# Patient Record
Sex: FEMALE | Race: WHITE | Employment: FULL TIME | URBAN - METROPOLITAN AREA
[De-identification: names, ages, dates, MRNs, and addresses within clinical notes are randomized per-mention and may not be internally consistent; named-entity substitution may affect disease eponyms.]

---

## 2017-10-16 ENCOUNTER — APPOINTMENT (OUTPATIENT)
Dept: ULTRASOUND IMAGING | Facility: HOSPITAL | Age: 47
End: 2017-10-16
Payer: COMMERCIAL

## 2017-10-16 ENCOUNTER — APPOINTMENT (OUTPATIENT)
Dept: GENERAL RADIOLOGY | Facility: HOSPITAL | Age: 47
End: 2017-10-16
Attending: EMERGENCY MEDICINE
Payer: COMMERCIAL

## 2017-10-16 ENCOUNTER — HOSPITAL ENCOUNTER (EMERGENCY)
Facility: HOSPITAL | Age: 47
Discharge: HOME | End: 2017-10-16
Payer: COMMERCIAL

## 2017-10-16 VITALS
HEART RATE: 59 BPM | WEIGHT: 250 LBS | DIASTOLIC BLOOD PRESSURE: 93 MMHG | RESPIRATION RATE: 18 BRPM | SYSTOLIC BLOOD PRESSURE: 149 MMHG | OXYGEN SATURATION: 98 % | BODY MASS INDEX: 41.65 KG/M2 | TEMPERATURE: 98 F | HEIGHT: 65 IN

## 2017-10-16 DIAGNOSIS — M79.604 RIGHT LEG PAIN: Primary | ICD-10-CM

## 2017-10-16 PROCEDURE — 73590 X-RAY EXAM OF LOWER LEG: CPT | Performed by: EMERGENCY MEDICINE

## 2017-10-16 PROCEDURE — 99284 EMERGENCY DEPT VISIT MOD MDM: CPT

## 2017-10-16 PROCEDURE — 93971 EXTREMITY STUDY: CPT

## 2017-10-16 RX ORDER — TRAMADOL HYDROCHLORIDE 50 MG/1
50 TABLET ORAL EVERY 4 HOURS PRN
Qty: 20 TABLET | Refills: 0 | Status: SHIPPED | OUTPATIENT
Start: 2017-10-16 | End: 2017-10-23

## 2017-10-16 RX ORDER — IBUPROFEN 600 MG/1
600 TABLET ORAL EVERY 8 HOURS PRN
Qty: 30 TABLET | Refills: 0 | Status: SHIPPED | OUTPATIENT
Start: 2017-10-16 | End: 2017-10-23

## 2017-10-16 NOTE — ED PROVIDER NOTES
Patient Seen in: [facility] AND [facility] Emergency Department    History   Patient presents with:  Deep Vein Thrombosis (cardiovascular)    Stated Complaint: right leg pain behind the knee    HPI    Patient presents emergency department complaining of 3 days o are intact. Normal sensation light touch is present. Nursing note and vitals reviewed.             ED Course   Labs Reviewed - No data to display    ============================================================  ED Course  -------------------------------- Tab  Take 1 tablet (600 mg total) by mouth every 8 (eight) hours as needed for Pain or Fever., Script printed, Disp-30 tablet, R-0

## 2017-10-16 NOTE — ED INITIAL ASSESSMENT (HPI)
Right ankle pain that's radiates up entire leg awoke with this today denies any injury denies any hx of DVT

## 2017-12-12 ENCOUNTER — OFFICE VISIT (OUTPATIENT)
Dept: FAMILY MEDICINE CLINIC | Facility: CLINIC | Age: 47
End: 2017-12-12

## 2017-12-12 VITALS
SYSTOLIC BLOOD PRESSURE: 151 MMHG | HEART RATE: 54 BPM | DIASTOLIC BLOOD PRESSURE: 99 MMHG | HEIGHT: 64 IN | BODY MASS INDEX: 41.83 KG/M2 | TEMPERATURE: 98 F | WEIGHT: 245 LBS

## 2017-12-12 DIAGNOSIS — E66.3 OVERWEIGHT: ICD-10-CM

## 2017-12-12 DIAGNOSIS — Z00.00 ANNUAL PHYSICAL EXAM: Primary | ICD-10-CM

## 2017-12-12 PROCEDURE — 99386 PREV VISIT NEW AGE 40-64: CPT | Performed by: FAMILY MEDICINE

## 2017-12-12 RX ORDER — TRAMADOL HYDROCHLORIDE 50 MG/1
TABLET ORAL
Refills: 0 | COMMUNITY
Start: 2017-11-02 | End: 2017-12-12 | Stop reason: ALTCHOICE

## 2017-12-15 NOTE — PROGRESS NOTES
HPI:    Patient ID: Dyan Mak is a 49year old female. HPI    Review of Systems   Constitutional: Negative. Respiratory: Negative. Cardiovascular: Negative. Gastrointestinal: Negative. Skin: Negative. Neurological: Negative.

## 2018-01-03 ENCOUNTER — LAB ENCOUNTER (OUTPATIENT)
Dept: LAB | Age: 48
End: 2018-01-03
Attending: FAMILY MEDICINE
Payer: COMMERCIAL

## 2018-01-03 DIAGNOSIS — Z00.00 ANNUAL PHYSICAL EXAM: ICD-10-CM

## 2018-01-03 LAB
ALBUMIN SERPL BCP-MCNC: 3.5 G/DL (ref 3.5–4.8)
ALBUMIN/GLOB SERPL: 1.2 {RATIO} (ref 1–2)
ALP SERPL-CCNC: 50 U/L (ref 32–100)
ALT SERPL-CCNC: 12 U/L (ref 14–54)
ANION GAP SERPL CALC-SCNC: 6 MMOL/L (ref 0–18)
AST SERPL-CCNC: 13 U/L (ref 15–41)
BASOPHILS # BLD: 0.1 K/UL (ref 0–0.2)
BASOPHILS NFR BLD: 1 %
BILIRUB SERPL-MCNC: 0.9 MG/DL (ref 0.3–1.2)
BUN SERPL-MCNC: 15 MG/DL (ref 8–20)
BUN/CREAT SERPL: 18.1 (ref 10–20)
CALCIUM SERPL-MCNC: 8.8 MG/DL (ref 8.5–10.5)
CHLORIDE SERPL-SCNC: 106 MMOL/L (ref 95–110)
CHOLEST SERPL-MCNC: 172 MG/DL (ref 110–200)
CO2 SERPL-SCNC: 28 MMOL/L (ref 22–32)
CREAT SERPL-MCNC: 0.83 MG/DL (ref 0.5–1.5)
EOSINOPHIL # BLD: 0.2 K/UL (ref 0–0.7)
EOSINOPHIL NFR BLD: 3 %
ERYTHROCYTE [DISTWIDTH] IN BLOOD BY AUTOMATED COUNT: 15 % (ref 11–15)
GLOBULIN PLAS-MCNC: 2.9 G/DL (ref 2.5–3.7)
GLUCOSE SERPL-MCNC: 84 MG/DL (ref 70–99)
HCT VFR BLD AUTO: 38 % (ref 35–48)
HDLC SERPL-MCNC: 69 MG/DL
HGB BLD-MCNC: 12.7 G/DL (ref 12–16)
LDLC SERPL CALC-MCNC: 92 MG/DL (ref 0–99)
LYMPHOCYTES # BLD: 1.6 K/UL (ref 1–4)
LYMPHOCYTES NFR BLD: 30 %
MCH RBC QN AUTO: 27.7 PG (ref 27–32)
MCHC RBC AUTO-ENTMCNC: 33.3 G/DL (ref 32–37)
MCV RBC AUTO: 83.2 FL (ref 80–100)
MONOCYTES # BLD: 0.4 K/UL (ref 0–1)
MONOCYTES NFR BLD: 8 %
NEUTROPHILS # BLD AUTO: 3.1 K/UL (ref 1.8–7.7)
NEUTROPHILS NFR BLD: 58 %
NONHDLC SERPL-MCNC: 103 MG/DL
OSMOLALITY UR CALC.SUM OF ELEC: 290 MOSM/KG (ref 275–295)
PLATELET # BLD AUTO: 202 K/UL (ref 140–400)
PMV BLD AUTO: 8.9 FL (ref 7.4–10.3)
POTASSIUM SERPL-SCNC: 4.5 MMOL/L (ref 3.3–5.1)
PROT SERPL-MCNC: 6.4 G/DL (ref 5.9–8.4)
RBC # BLD AUTO: 4.56 M/UL (ref 3.7–5.4)
SODIUM SERPL-SCNC: 140 MMOL/L (ref 136–144)
TRIGL SERPL-MCNC: 56 MG/DL (ref 1–149)
TSH SERPL-ACNC: 2.24 UIU/ML (ref 0.45–5.33)
WBC # BLD AUTO: 5.4 K/UL (ref 4–11)

## 2018-01-03 PROCEDURE — 84443 ASSAY THYROID STIM HORMONE: CPT

## 2018-01-03 PROCEDURE — 85025 COMPLETE CBC W/AUTO DIFF WBC: CPT

## 2018-01-03 PROCEDURE — 80053 COMPREHEN METABOLIC PANEL: CPT

## 2018-01-03 PROCEDURE — 36415 COLL VENOUS BLD VENIPUNCTURE: CPT

## 2018-01-03 PROCEDURE — 80061 LIPID PANEL: CPT

## 2018-01-05 ENCOUNTER — TELEPHONE (OUTPATIENT)
Dept: FAMILY MEDICINE CLINIC | Facility: CLINIC | Age: 48
End: 2018-01-05

## 2018-01-05 NOTE — TELEPHONE ENCOUNTER
----- Message from Prabha Linares MD sent at 1/5/2018  4:56 AM CST -----  Results normal. Please call patient and send results.  Continue present management

## 2018-01-10 ENCOUNTER — NURSE TRIAGE (OUTPATIENT)
Dept: OTHER | Age: 48
End: 2018-01-10

## 2018-01-10 NOTE — TELEPHONE ENCOUNTER
Action Requested: Summary for Provider     []  Critical Lab, Recommendations Needed  [] Need Additional Advice  []   FYI    []   Need Orders  [] Need Medications Sent to Pharmacy  []  Other     SUMMARY: Pt stts for 3 days has dry cough,sore throat,body ach

## 2018-01-11 ENCOUNTER — OFFICE VISIT (OUTPATIENT)
Dept: FAMILY MEDICINE CLINIC | Facility: CLINIC | Age: 48
End: 2018-01-11

## 2018-01-11 VITALS
TEMPERATURE: 98 F | HEART RATE: 62 BPM | DIASTOLIC BLOOD PRESSURE: 89 MMHG | WEIGHT: 245 LBS | SYSTOLIC BLOOD PRESSURE: 131 MMHG | BODY MASS INDEX: 42 KG/M2

## 2018-01-11 DIAGNOSIS — J11.1 INFLUENZA: Primary | ICD-10-CM

## 2018-01-11 LAB
ADENOVIRUS PCR:: NEGATIVE
B PERT DNA SPEC QL NAA+PROBE: NEGATIVE
C PNEUM DNA SPEC QL NAA+PROBE: NEGATIVE
CORONAVIRUS 229E PCR:: NEGATIVE
CORONAVIRUS HKU1 PCR:: POSITIVE
CORONAVIRUS NL63 PCR:: NEGATIVE
CORONAVIRUS OC43 PCR:: NEGATIVE
FLUAV RNA SPEC QL NAA+PROBE: NEGATIVE
FLUBV RNA SPEC QL NAA+PROBE: NEGATIVE
METAPNEUMOVIRUS PCR:: NEGATIVE
MYCOPLASMA PNEUMONIA PCR:: NEGATIVE
PARAINFLUENZA 1 PCR:: NEGATIVE
PARAINFLUENZA 2 PCR:: NEGATIVE
PARAINFLUENZA 3 PCR:: NEGATIVE
PARAINFLUENZA 4 PCR:: NEGATIVE
RHINOVIRUS/ENTERO PCR:: NEGATIVE
RSV RNA SPEC QL NAA+PROBE: NEGATIVE

## 2018-01-11 PROCEDURE — 99213 OFFICE O/P EST LOW 20 MIN: CPT | Performed by: FAMILY MEDICINE

## 2018-01-11 PROCEDURE — 99212 OFFICE O/P EST SF 10 MIN: CPT | Performed by: FAMILY MEDICINE

## 2018-01-11 RX ORDER — OSELTAMIVIR PHOSPHATE 75 MG/1
75 CAPSULE ORAL 2 TIMES DAILY
Qty: 10 CAPSULE | Refills: 0 | Status: SHIPPED | OUTPATIENT
Start: 2018-01-11 | End: 2018-12-04

## 2018-01-11 NOTE — PROGRESS NOTES
HPI:    Patient ID: Boogie Hernandez is a 49year old female. Started Sunday night. A lot of body aches, some cough, sore thrat and fever. Not better at all/        Review of Systems   Constitutional: Positive for fatigue.  Negative for activity change, appe

## 2018-01-12 ENCOUNTER — NURSE TRIAGE (OUTPATIENT)
Dept: OTHER | Age: 48
End: 2018-01-12

## 2018-01-12 RX ORDER — ONDANSETRON HYDROCHLORIDE 8 MG/1
8 TABLET, FILM COATED ORAL 3 TIMES DAILY PRN
Qty: 20 TABLET | Refills: 0 | Status: SHIPPED | OUTPATIENT
Start: 2018-01-12 | End: 2018-12-04 | Stop reason: ALTCHOICE

## 2018-01-12 NOTE — TELEPHONE ENCOUNTER
Vomiting and nausea since started taking tamiflu yesterday. Has vomited about 9 times in the last 24 hours. Patient is drinking fluids and urinating every 6-8 hours. No diarrhea. Not sure if has a fever, since did not take temperature.  Not able to look at

## 2018-01-13 NOTE — TELEPHONE ENCOUNTER
Advised patient of Dr. Josie Nowak note and if heladiofran not helping with the vomiting, not able to keep fluids down, or not urinating then needs to go to the ER. Patient verbalized understanding. Rx sent to pharmacy.

## 2018-01-15 ENCOUNTER — PATIENT MESSAGE (OUTPATIENT)
Dept: FAMILY MEDICINE CLINIC | Facility: CLINIC | Age: 48
End: 2018-01-15

## 2018-01-16 NOTE — TELEPHONE ENCOUNTER
From: Eileen Christianson  To: Eleuterio Mccullough MD  Sent: 1/15/2018 9:55 AM CST  Subject: Test Results Question    I had the test for the flu and was never given the results. Did I have the flu? If so what kind, please post test results. Thank you.

## 2018-10-31 ENCOUNTER — TELEPHONE (OUTPATIENT)
Dept: FAMILY MEDICINE CLINIC | Facility: CLINIC | Age: 48
End: 2018-10-31

## 2018-10-31 DIAGNOSIS — Z12.31 SCREENING MAMMOGRAM, ENCOUNTER FOR: Primary | ICD-10-CM

## 2018-11-12 ENCOUNTER — TELEPHONE (OUTPATIENT)
Dept: FAMILY MEDICINE CLINIC | Facility: CLINIC | Age: 48
End: 2018-11-12

## 2018-11-12 NOTE — TELEPHONE ENCOUNTER
Spoke with patient regarding appt. For Tuesday 11-13-18 and that Dr. Kanika Santiago may be at a meeting at that time. Patient is requesting to see Dr. Kanika Santiago and doesn't get off of work until 5 pm. Patient would not disclose what she wanted to be seen for.  Informe

## 2018-11-13 ENCOUNTER — NURSE TRIAGE (OUTPATIENT)
Dept: OTHER | Age: 48
End: 2018-11-13

## 2018-11-13 NOTE — TELEPHONE ENCOUNTER
Action Requested: Summary for Provider     []  Critical Lab, Recommendations Needed  [] Need Additional Advice  [x]   FYI    []   Need Orders  [] Need Medications Sent to Pharmacy  []  Other     SUMMARY: Call transferred to RN as PCP had to cancel and resc

## 2018-11-14 NOTE — TELEPHONE ENCOUNTER
Pt states that she was not able to go to the ER. States that she gets HAs everyday and would really like to discuss this with her PCP at an appt instead of the ER. Pt's appt 11/13 was cancelled due to a change in the PCP's schedule.   Could pt possibly be

## 2018-11-19 NOTE — TELEPHONE ENCOUNTER
LMTCB please transfer to triage. Thanks  Pt was sent a no response letter through [address] [place] [address] [address]. Spoke to Dr Smita Bui about pt failing speech evaluation     Nicol Villagran RN  34/02/79 [phone]

## 2018-12-04 ENCOUNTER — OFFICE VISIT (OUTPATIENT)
Dept: FAMILY MEDICINE CLINIC | Facility: CLINIC | Age: 48
End: 2018-12-04
Payer: COMMERCIAL

## 2018-12-04 VITALS
SYSTOLIC BLOOD PRESSURE: 129 MMHG | TEMPERATURE: 98 F | WEIGHT: 260.19 LBS | HEIGHT: 64 IN | HEART RATE: 66 BPM | BODY MASS INDEX: 44.42 KG/M2 | DIASTOLIC BLOOD PRESSURE: 86 MMHG

## 2018-12-04 DIAGNOSIS — Z00.00 ANNUAL PHYSICAL EXAM: Primary | ICD-10-CM

## 2018-12-04 DIAGNOSIS — Z01.419 WOMEN'S ANNUAL ROUTINE GYNECOLOGICAL EXAMINATION: ICD-10-CM

## 2018-12-04 PROCEDURE — 99396 PREV VISIT EST AGE 40-64: CPT | Performed by: FAMILY MEDICINE

## 2018-12-05 NOTE — PROGRESS NOTES
HPI:    Patient ID: Alma Bello is a 51year old female. Here for pap test        Review of Systems   Constitutional: Negative. Negative for activity change, appetite change, chills and diaphoresis. Respiratory: Negative.   Negative for cough, chest ti reflexes. Skin: Skin is warm. Psychiatric: She has a normal mood and affect. Her behavior is normal. Thought content normal.              ASSESSMENT/PLAN:   No diagnosis found. No orders of the defined types were placed in this encounter.       Meds

## 2019-03-22 ENCOUNTER — OFFICE VISIT (OUTPATIENT)
Dept: FAMILY MEDICINE CLINIC | Facility: CLINIC | Age: 49
End: 2019-03-22
Payer: COMMERCIAL

## 2019-03-22 ENCOUNTER — OFFICE VISIT (OUTPATIENT)
Dept: OTOLARYNGOLOGY | Facility: CLINIC | Age: 49
End: 2019-03-22
Payer: COMMERCIAL

## 2019-03-22 VITALS
SYSTOLIC BLOOD PRESSURE: 148 MMHG | TEMPERATURE: 98 F | DIASTOLIC BLOOD PRESSURE: 88 MMHG | WEIGHT: 261 LBS | HEIGHT: 64 IN | BODY MASS INDEX: 44.56 KG/M2

## 2019-03-22 VITALS
BODY MASS INDEX: 44.56 KG/M2 | HEIGHT: 64 IN | WEIGHT: 261 LBS | TEMPERATURE: 98 F | DIASTOLIC BLOOD PRESSURE: 89 MMHG | HEART RATE: 60 BPM | SYSTOLIC BLOOD PRESSURE: 141 MMHG

## 2019-03-22 DIAGNOSIS — H92.02 LEFT EAR PAIN: Primary | ICD-10-CM

## 2019-03-22 DIAGNOSIS — R07.0 THROAT PAIN: Primary | ICD-10-CM

## 2019-03-22 DIAGNOSIS — R07.0 THROAT PAIN IN ADULT: ICD-10-CM

## 2019-03-22 PROCEDURE — 99212 OFFICE O/P EST SF 10 MIN: CPT | Performed by: FAMILY MEDICINE

## 2019-03-22 PROCEDURE — 99213 OFFICE O/P EST LOW 20 MIN: CPT | Performed by: FAMILY MEDICINE

## 2019-03-22 PROCEDURE — 99243 OFF/OP CNSLTJ NEW/EST LOW 30: CPT | Performed by: OTOLARYNGOLOGY

## 2019-03-22 PROCEDURE — 99212 OFFICE O/P EST SF 10 MIN: CPT | Performed by: OTOLARYNGOLOGY

## 2019-03-22 RX ORDER — AZELASTINE 1 MG/ML
2 SPRAY, METERED NASAL 2 TIMES DAILY
Qty: 1 BOTTLE | Refills: 3 | Status: SHIPPED | OUTPATIENT
Start: 2019-03-22 | End: 2020-01-28 | Stop reason: ALTCHOICE

## 2019-03-22 RX ORDER — CELECOXIB 200 MG/1
200 CAPSULE ORAL DAILY PRN
Qty: 30 CAPSULE | Refills: 0 | Status: SHIPPED | OUTPATIENT
Start: 2019-03-22 | End: 2019-04-15

## 2019-03-22 RX ORDER — CYCLOBENZAPRINE HCL 5 MG
5 TABLET ORAL 3 TIMES DAILY PRN
Qty: 90 TABLET | Refills: 1 | Status: SHIPPED | OUTPATIENT
Start: 2019-03-22 | End: 2020-01-28 | Stop reason: ALTCHOICE

## 2019-03-22 RX ORDER — LORATADINE 10 MG/1
10 TABLET ORAL DAILY
Qty: 30 TABLET | Refills: 3 | Status: SHIPPED | OUTPATIENT
Start: 2019-03-22 | End: 2020-01-28 | Stop reason: ALTCHOICE

## 2019-03-22 RX ORDER — MONTELUKAST SODIUM 10 MG/1
10 TABLET ORAL NIGHTLY
Qty: 30 TABLET | Refills: 3 | Status: SHIPPED | OUTPATIENT
Start: 2019-03-22 | End: 2020-01-28 | Stop reason: ALTCHOICE

## 2019-03-22 NOTE — PROGRESS NOTES
Cheryl Trinidad is a 51year old female. Patient presents with:  Ear Pain: left ear for 1 week  Sore Throat: for 2 days       HISTORY OF PRESENT ILLNESS  She presents with a one-week history of left-sided ear pain.   She does chew only on one side of her mouth Psych Negative Anxiety and depression. Integumentary Negative Frequent skin infections, pigment change and rash. Hema/Lymph Negative Easy bleeding and easy bruising.            PHYSICAL EXAM    /88   Temp 98.4 °F (36.9 °C) (Tympanic)   Ht 5' 4\" Bottle, Rfl: 3  •  celecoxib 200 MG Oral Cap, Take 1 capsule (200 mg total) by mouth daily as needed for Pain., Disp: 30 capsule, Rfl: 0  •  Cyclobenzaprine HCl 5 MG Oral Tab, Take 1 tablet (5 mg total) by mouth 3 (three) times daily as needed for Muscle s

## 2019-03-23 NOTE — PROGRESS NOTES
HPI:    Patient ID: Chandni Rene is a 51year old female. Patient complaining about sore throat and severe headache left side around the left ear and jaw area. No fever. also feels slightly sick with body aches.    No cough          Review of Systems   Cons swelling of the eft side of the face referred to ent the same day to make sure there is no infection in the sinus area . Apt made the same morning  No orders of the defined types were placed in this encounter.       Meds This Visit:  Requested Prescriptions

## 2019-04-16 RX ORDER — CELECOXIB 200 MG/1
CAPSULE ORAL
Qty: 30 CAPSULE | Refills: 0 | Status: SHIPPED | OUTPATIENT
Start: 2019-04-16 | End: 2019-05-31

## 2019-05-31 NOTE — TELEPHONE ENCOUNTER
Patient last visit on 3/22/19 left ear pain request for refill,last note pt is due for follow up,please advise.

## 2019-06-03 RX ORDER — CELECOXIB 200 MG/1
CAPSULE ORAL
Qty: 30 CAPSULE | Refills: 0 | Status: SHIPPED | OUTPATIENT
Start: 2019-06-03 | End: 2020-01-28 | Stop reason: ALTCHOICE

## 2020-01-28 ENCOUNTER — OFFICE VISIT (OUTPATIENT)
Dept: FAMILY MEDICINE CLINIC | Facility: CLINIC | Age: 50
End: 2020-01-28
Payer: COMMERCIAL

## 2020-01-28 VITALS
HEART RATE: 62 BPM | SYSTOLIC BLOOD PRESSURE: 146 MMHG | TEMPERATURE: 98 F | DIASTOLIC BLOOD PRESSURE: 86 MMHG | WEIGHT: 264 LBS | BODY MASS INDEX: 45.07 KG/M2 | HEIGHT: 64 IN

## 2020-01-28 DIAGNOSIS — Z00.00 ANNUAL PHYSICAL EXAM: Primary | ICD-10-CM

## 2020-01-28 PROCEDURE — 99396 PREV VISIT EST AGE 40-64: CPT | Performed by: FAMILY MEDICINE

## 2020-01-28 RX ORDER — ESCITALOPRAM OXALATE 10 MG/1
TABLET ORAL
Qty: 90 TABLET | Refills: 0 | Status: SHIPPED | OUTPATIENT
Start: 2020-01-28 | End: 2020-11-24

## 2020-01-28 NOTE — PROGRESS NOTES
HPI:    Patient ID: Parmjit Burrell is a 47year old female. Feels stiff, has headaches daily over a month ago. ..also feels itchy and anxious      Review of Systems   Constitutional: Positive for fatigue.  Negative for activity change, appetite change, chills prescriptions requested or ordered in this encounter       Imaging & Referrals:  None       VR#[id number]

## 2020-01-29 ENCOUNTER — LAB ENCOUNTER (OUTPATIENT)
Dept: LAB | Age: 50
End: 2020-01-29
Attending: FAMILY MEDICINE
Payer: COMMERCIAL

## 2020-01-29 DIAGNOSIS — Z00.00 ANNUAL PHYSICAL EXAM: ICD-10-CM

## 2020-01-29 LAB
ALBUMIN SERPL-MCNC: 3.4 G/DL (ref 3.4–5)
ALBUMIN/GLOB SERPL: 0.9 {RATIO} (ref 1–2)
ALP LIVER SERPL-CCNC: 58 U/L (ref 39–100)
ALT SERPL-CCNC: 13 U/L (ref 13–56)
ANION GAP SERPL CALC-SCNC: 3 MMOL/L (ref 0–18)
AST SERPL-CCNC: 19 U/L (ref 15–37)
BASOPHILS # BLD AUTO: 0.06 X10(3) UL (ref 0–0.2)
BASOPHILS NFR BLD AUTO: 0.9 %
BILIRUB SERPL-MCNC: 1.4 MG/DL (ref 0.1–2)
BUN BLD-MCNC: 13 MG/DL (ref 7–18)
BUN/CREAT SERPL: 13.5 (ref 10–20)
CALCIUM BLD-MCNC: 8.5 MG/DL (ref 8.5–10.1)
CHLORIDE SERPL-SCNC: 106 MMOL/L (ref 98–112)
CHOLEST SMN-MCNC: 173 MG/DL (ref ?–200)
CO2 SERPL-SCNC: 30 MMOL/L (ref 21–32)
CREAT BLD-MCNC: 0.96 MG/DL (ref 0.55–1.02)
DEPRECATED RDW RBC AUTO: 41.5 FL (ref 35.1–46.3)
EOSINOPHIL # BLD AUTO: 0.22 X10(3) UL (ref 0–0.7)
EOSINOPHIL NFR BLD AUTO: 3.2 %
ERYTHROCYTE [DISTWIDTH] IN BLOOD BY AUTOMATED COUNT: 13.6 % (ref 11–15)
GLOBULIN PLAS-MCNC: 3.9 G/DL (ref 2.8–4.4)
GLUCOSE BLD-MCNC: 81 MG/DL (ref 70–99)
HCT VFR BLD AUTO: 39.6 % (ref 35–48)
HDLC SERPL-MCNC: 68 MG/DL (ref 40–59)
HGB BLD-MCNC: 12.4 G/DL (ref 12–16)
IMM GRANULOCYTES # BLD AUTO: 0.02 X10(3) UL (ref 0–1)
IMM GRANULOCYTES NFR BLD: 0.3 %
LDLC SERPL CALC-MCNC: 88 MG/DL (ref ?–100)
LYMPHOCYTES # BLD AUTO: 1.55 X10(3) UL (ref 1–4)
LYMPHOCYTES NFR BLD AUTO: 22.3 %
M PROTEIN MFR SERPL ELPH: 7.3 G/DL (ref 6.4–8.2)
MCH RBC QN AUTO: 26.5 PG (ref 26–34)
MCHC RBC AUTO-ENTMCNC: 31.3 G/DL (ref 31–37)
MCV RBC AUTO: 84.6 FL (ref 80–100)
MONOCYTES # BLD AUTO: 0.54 X10(3) UL (ref 0.1–1)
MONOCYTES NFR BLD AUTO: 7.8 %
NEUTROPHILS # BLD AUTO: 4.56 X10 (3) UL (ref 1.5–7.7)
NEUTROPHILS # BLD AUTO: 4.56 X10(3) UL (ref 1.5–7.7)
NEUTROPHILS NFR BLD AUTO: 65.5 %
NONHDLC SERPL-MCNC: 105 MG/DL (ref ?–130)
OSMOLALITY SERPL CALC.SUM OF ELEC: 287 MOSM/KG (ref 275–295)
PATIENT FASTING Y/N/NP: YES
PATIENT FASTING Y/N/NP: YES
PLATELET # BLD AUTO: 228 10(3)UL (ref 150–450)
POTASSIUM SERPL-SCNC: 4.7 MMOL/L (ref 3.5–5.1)
RBC # BLD AUTO: 4.68 X10(6)UL (ref 3.8–5.3)
SODIUM SERPL-SCNC: 139 MMOL/L (ref 136–145)
TRIGL SERPL-MCNC: 84 MG/DL (ref 30–149)
TSI SER-ACNC: 1.38 MIU/ML (ref 0.36–3.74)
VLDLC SERPL CALC-MCNC: 17 MG/DL (ref 0–30)
WBC # BLD AUTO: 7 X10(3) UL (ref 4–11)

## 2020-01-29 PROCEDURE — 36415 COLL VENOUS BLD VENIPUNCTURE: CPT

## 2020-01-29 PROCEDURE — 80053 COMPREHEN METABOLIC PANEL: CPT

## 2020-01-29 PROCEDURE — 85025 COMPLETE CBC W/AUTO DIFF WBC: CPT

## 2020-01-29 PROCEDURE — 84443 ASSAY THYROID STIM HORMONE: CPT

## 2020-01-29 PROCEDURE — 80061 LIPID PANEL: CPT

## 2020-01-30 ENCOUNTER — PATIENT MESSAGE (OUTPATIENT)
Dept: FAMILY MEDICINE CLINIC | Facility: CLINIC | Age: 50
End: 2020-01-30

## 2020-01-30 ENCOUNTER — TELEPHONE (OUTPATIENT)
Dept: FAMILY MEDICINE CLINIC | Facility: CLINIC | Age: 50
End: 2020-01-30

## 2020-01-30 NOTE — TELEPHONE ENCOUNTER
Patient is calling because Dr. Anita Ibanez gave her doctors note to return to work on February 3rd, she is wanting to know if the note can be extended for one week and she return to work on February 10th. She states Dr. Anita Ibanez gave her medication and she is going to see if it works.

## 2020-01-31 ENCOUNTER — PATIENT MESSAGE (OUTPATIENT)
Dept: FAMILY MEDICINE CLINIC | Facility: CLINIC | Age: 50
End: 2020-01-31

## 2020-01-31 NOTE — TELEPHONE ENCOUNTER
Office staff=please print the NOTE and patient will pick it up on Saturday. From: Eileen Christianson  To: Carlos Quintanilla MD  Sent: 1/31/2020 10:24 AM CST  Subject: Visit Follow-up Question    Thank you for the updated doctors note.  Can you please leave a copy

## 2020-01-31 NOTE — TELEPHONE ENCOUNTER
Siri message sent. From: Eileen Christianson  To: Faisal Duenas MD  Sent: 1/30/2020  5:59 PM CST  Subject: Other    I left a message through the phone. I would like to extend my medical leave from work until 2/10/20.  I would like to give the new meds a ch

## 2020-02-26 ENCOUNTER — OFFICE VISIT (OUTPATIENT)
Dept: FAMILY MEDICINE CLINIC | Facility: CLINIC | Age: 50
End: 2020-02-26
Payer: COMMERCIAL

## 2020-02-26 VITALS
DIASTOLIC BLOOD PRESSURE: 88 MMHG | HEIGHT: 64 IN | TEMPERATURE: 98 F | BODY MASS INDEX: 45.24 KG/M2 | WEIGHT: 265 LBS | HEART RATE: 48 BPM | SYSTOLIC BLOOD PRESSURE: 172 MMHG

## 2020-02-26 DIAGNOSIS — F32.A DEPRESSION, UNSPECIFIED DEPRESSION TYPE: Primary | ICD-10-CM

## 2020-02-26 DIAGNOSIS — M54.50 BILATERAL LOW BACK PAIN WITHOUT SCIATICA, UNSPECIFIED CHRONICITY: ICD-10-CM

## 2020-02-26 PROCEDURE — 99215 OFFICE O/P EST HI 40 MIN: CPT | Performed by: FAMILY MEDICINE

## 2020-02-26 RX ORDER — LORAZEPAM 0.5 MG/1
0.5 TABLET ORAL EVERY 6 HOURS PRN
Qty: 40 TABLET | Refills: 1 | Status: SHIPPED | OUTPATIENT
Start: 2020-02-26 | End: 2020-04-28

## 2020-02-26 RX ORDER — ZOLPIDEM TARTRATE 10 MG/1
TABLET ORAL
Qty: 30 TABLET | Refills: 0 | Status: SHIPPED | OUTPATIENT
Start: 2020-02-26 | End: 2020-03-28

## 2020-02-26 NOTE — PROGRESS NOTES
HPI:    Patient ID: Octavio Curtis is a 47year old female. Headaches since few months ago. .. Right side of the head is throbbing. Also some vision difficulties. Feeling slightly confused. Also feels anxious sinve few months ago. No nuasea.    Also type  (primary encounter diagnosis)  Plan: continue lexapro   Headaches- ct scan brain   (M54.5) Bilateral low back pain without sciatica, unspecified chronicity  Plan:start physical therapy.  Pain appears muscular  F/u in 3 weeks      No orders of the defi

## 2020-03-02 ENCOUNTER — HOSPITAL ENCOUNTER (OUTPATIENT)
Dept: CT IMAGING | Facility: HOSPITAL | Age: 50
Discharge: HOME | End: 2020-03-02
Attending: FAMILY MEDICINE
Payer: COMMERCIAL

## 2020-03-02 DIAGNOSIS — R51.9 NONINTRACTABLE EPISODIC HEADACHE, UNSPECIFIED HEADACHE TYPE: ICD-10-CM

## 2020-03-02 PROCEDURE — 70450 CT HEAD/BRAIN W/O DYE: CPT | Performed by: FAMILY MEDICINE

## 2020-03-04 ENCOUNTER — TELEPHONE (OUTPATIENT)
Dept: PHYSICAL THERAPY | Facility: HOSPITAL | Age: 50
End: 2020-03-04

## 2020-03-04 ENCOUNTER — OFFICE VISIT (OUTPATIENT)
Dept: PHYSICAL THERAPY | Facility: HOSPITAL | Age: 50
End: 2020-03-04
Attending: FAMILY MEDICINE
Payer: COMMERCIAL

## 2020-03-04 DIAGNOSIS — M54.89 OTHER BACK PAIN, UNSPECIFIED CHRONICITY: ICD-10-CM

## 2020-03-04 PROCEDURE — 97162 PT EVAL MOD COMPLEX 30 MIN: CPT

## 2020-03-04 PROCEDURE — 97110 THERAPEUTIC EXERCISES: CPT

## 2020-03-04 NOTE — PROGRESS NOTES
LUMBAR SPINE EVALUATION:   Referring Physician: Dr. rGegoria Wilson  Diagnosis: Other back pain, unspecified chronicity (M54.89)    Date of Service: 3/4/2020     PATIENT SUMMARY   Jagdish Geller is a 47year old y/o female who presents to therapy today with complai unspecified chronicity (M54.89) and who presents with impairments in AROM.  The patient has the provisional classification of a lumbar derangement that responded with positive mechanical effects of improved lumbar AROM, decreased pain with repeated ext in s treatment plan, expectations, and prognosis.  Pt was also provided recommendations for pt/ot/slp education: activity modifications, possible soreness after evaluation, modalities as needed [ice/heat], postural corrections, ergonomics and pain science educat therapist: Valdemar Sandoval, PT, DPT, CSCS      Physician's certification required: Yes  I certify the need for these services furnished under this plan of treatment and while under my care.     X____________________________________________

## 2020-03-06 NOTE — PROGRESS NOTES
HPI:    Patient ID: Boogie Hernandez is a 47year old female. Here for f/u test results. And depression. Doing better. Able to sleep. Headaches imporved      Review of Systems   Constitutional: Positive for fatigue.  Negative for activity change, appetite samson

## 2020-03-09 ENCOUNTER — APPOINTMENT (OUTPATIENT)
Dept: PHYSICAL THERAPY | Facility: HOSPITAL | Age: 50
End: 2020-03-09
Attending: FAMILY MEDICINE
Payer: COMMERCIAL

## 2020-03-11 ENCOUNTER — APPOINTMENT (OUTPATIENT)
Dept: PHYSICAL THERAPY | Facility: HOSPITAL | Age: 50
End: 2020-03-11
Attending: FAMILY MEDICINE
Payer: COMMERCIAL

## 2020-03-16 ENCOUNTER — APPOINTMENT (OUTPATIENT)
Dept: PHYSICAL THERAPY | Facility: HOSPITAL | Age: 50
End: 2020-03-16
Attending: FAMILY MEDICINE
Payer: COMMERCIAL

## 2020-03-18 ENCOUNTER — APPOINTMENT (OUTPATIENT)
Dept: PHYSICAL THERAPY | Facility: HOSPITAL | Age: 50
End: 2020-03-18
Attending: FAMILY MEDICINE
Payer: COMMERCIAL

## 2020-03-19 ENCOUNTER — TELEPHONE (OUTPATIENT)
Dept: FAMILY MEDICINE CLINIC | Facility: CLINIC | Age: 50
End: 2020-03-19

## 2020-03-19 PROCEDURE — 99442 PHONE E/M BY PHYS 11-20 MIN: CPT | Performed by: FAMILY MEDICINE

## 2020-03-19 NOTE — TELEPHONE ENCOUNTER
Spoke with patient and told her I will send her a letter to my chart for her work excuse. There is also a copy of the letter at the , although I suggested that she not  (my chart or mail) Pt said no she is picking up on Saturday.  Pt again

## 2020-03-19 NOTE — TELEPHONE ENCOUNTER
Virtual/Telephone Check-In    Eileen Christianson verbally consents to a Virtual/Telephone Check-In service on 03/19/20. Patient understands and accepts financial responsibility for any deductible, co-insurance and/or co-pays associated with this service.     Charlene

## 2020-03-28 RX ORDER — ZOLPIDEM TARTRATE 10 MG/1
TABLET ORAL
Qty: 30 TABLET | Refills: 0 | Status: SHIPPED | OUTPATIENT
Start: 2020-03-28 | End: 2020-04-28

## 2020-04-28 RX ORDER — LORAZEPAM 0.5 MG/1
TABLET ORAL
Qty: 40 TABLET | Refills: 0 | Status: SHIPPED | OUTPATIENT
Start: 2020-04-28 | End: 2020-11-24

## 2020-04-28 RX ORDER — ZOLPIDEM TARTRATE 10 MG/1
TABLET ORAL
Qty: 30 TABLET | Refills: 0 | Status: SHIPPED | OUTPATIENT
Start: 2020-04-28 | End: 2020-05-30

## 2020-05-30 RX ORDER — ZOLPIDEM TARTRATE 10 MG/1
TABLET ORAL
Qty: 30 TABLET | Refills: 0 | Status: SHIPPED | OUTPATIENT
Start: 2020-05-30 | End: 2020-11-24

## 2020-06-04 ENCOUNTER — TELEPHONE (OUTPATIENT)
Dept: FAMILY MEDICINE CLINIC | Facility: CLINIC | Age: 50
End: 2020-06-04

## 2020-06-04 RX ORDER — ZOLPIDEM TARTRATE 5 MG/1
5 TABLET ORAL NIGHTLY PRN
Qty: 30 TABLET | Refills: 1 | Status: SHIPPED | OUTPATIENT
Start: 2020-06-04 | End: 2020-06-09

## 2020-06-04 NOTE — TELEPHONE ENCOUNTER
pt stated that she has called many pharmacy's and they do not have the 10 mg dose of Ambien. Pt stated that the pharmacy informed her that they do have the 5 mg tablet.  Do you approve or the pharmacy to  provide pt with the 5 mg as the 10 mg is m

## 2020-06-08 ENCOUNTER — TELEPHONE (OUTPATIENT)
Dept: FAMILY MEDICINE CLINIC | Facility: CLINIC | Age: 50
End: 2020-06-08

## 2020-06-08 NOTE — TELEPHONE ENCOUNTER
Current Outpatient Medications   Medication Sig Dispense Refill   •       • ZOLPIDEM TARTRATE 10 MG Oral Tab TAKE 1/2 TO 1 TABLET BY MOUTH EVERY NIGHT AT BEDTIME AS NEEDED 30 tablet 0       NOTE[de-identified] medication is on back order for 10mg.  Pharmacy has 5mg and

## 2020-06-09 RX ORDER — ZOLPIDEM TARTRATE 5 MG/1
5 TABLET ORAL NIGHTLY PRN
Qty: 30 TABLET | Refills: 1 | Status: SHIPPED | OUTPATIENT
Start: 2020-06-09 | End: 2020-11-24

## 2020-07-14 RX ORDER — LORAZEPAM 0.5 MG/1
TABLET ORAL
Qty: 40 TABLET | Refills: 0 | OUTPATIENT
Start: 2020-07-14

## 2020-07-14 RX ORDER — ZOLPIDEM TARTRATE 10 MG/1
TABLET ORAL
Qty: 30 TABLET | Refills: 0 | OUTPATIENT
Start: 2020-07-14

## 2020-11-24 ENCOUNTER — OFFICE VISIT (OUTPATIENT)
Dept: FAMILY MEDICINE CLINIC | Facility: CLINIC | Age: 50
End: 2020-11-24
Payer: COMMERCIAL

## 2020-11-24 VITALS
HEART RATE: 80 BPM | BODY MASS INDEX: 44.56 KG/M2 | SYSTOLIC BLOOD PRESSURE: 143 MMHG | HEIGHT: 64 IN | WEIGHT: 261 LBS | DIASTOLIC BLOOD PRESSURE: 91 MMHG

## 2020-11-24 DIAGNOSIS — M54.89 OTHER BACK PAIN, UNSPECIFIED CHRONICITY: Primary | ICD-10-CM

## 2020-11-24 PROCEDURE — 99214 OFFICE O/P EST MOD 30 MIN: CPT | Performed by: FAMILY MEDICINE

## 2020-11-24 PROCEDURE — 3080F DIAST BP >= 90 MM HG: CPT | Performed by: FAMILY MEDICINE

## 2020-11-24 PROCEDURE — 99212 OFFICE O/P EST SF 10 MIN: CPT | Performed by: FAMILY MEDICINE

## 2020-11-24 PROCEDURE — 3077F SYST BP >= 140 MM HG: CPT | Performed by: FAMILY MEDICINE

## 2020-11-24 PROCEDURE — 3008F BODY MASS INDEX DOCD: CPT | Performed by: FAMILY MEDICINE

## 2020-11-24 RX ORDER — ZOLPIDEM TARTRATE 10 MG/1
10 TABLET ORAL NIGHTLY
Qty: 30 TABLET | Refills: 0 | Status: SHIPPED | OUTPATIENT
Start: 2020-11-24 | End: 2020-12-22

## 2020-11-24 RX ORDER — NAPROXEN 500 MG/1
500 TABLET ORAL 2 TIMES DAILY
Qty: 50 TABLET | Refills: 0 | Status: SHIPPED | OUTPATIENT
Start: 2020-11-24 | End: 2020-12-22

## 2020-11-24 RX ORDER — ESCITALOPRAM OXALATE 10 MG/1
TABLET ORAL
Qty: 90 TABLET | Refills: 0 | Status: SHIPPED | OUTPATIENT
Start: 2020-11-24 | End: 2020-12-22

## 2020-11-24 RX ORDER — DOXYCYCLINE 100 MG/1
CAPSULE ORAL
Qty: 30 CAPSULE | Refills: 1 | Status: SHIPPED | OUTPATIENT
Start: 2020-11-24 | End: 2021-01-21
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
## 2020-12-02 ENCOUNTER — OFFICE VISIT (OUTPATIENT)
Dept: FAMILY MEDICINE CLINIC | Facility: CLINIC | Age: 50
End: 2020-12-02
Payer: COMMERCIAL

## 2020-12-02 VITALS
HEART RATE: 66 BPM | HEIGHT: 64 IN | DIASTOLIC BLOOD PRESSURE: 84 MMHG | SYSTOLIC BLOOD PRESSURE: 132 MMHG | WEIGHT: 269 LBS | BODY MASS INDEX: 45.93 KG/M2

## 2020-12-02 DIAGNOSIS — M54.89 OTHER BACK PAIN, UNSPECIFIED CHRONICITY: Primary | ICD-10-CM

## 2020-12-02 DIAGNOSIS — M25.561 RIGHT KNEE PAIN, UNSPECIFIED CHRONICITY: ICD-10-CM

## 2020-12-02 PROCEDURE — 99212 OFFICE O/P EST SF 10 MIN: CPT | Performed by: FAMILY MEDICINE

## 2020-12-02 PROCEDURE — 3079F DIAST BP 80-89 MM HG: CPT | Performed by: FAMILY MEDICINE

## 2020-12-02 PROCEDURE — 97810 ACUP 1/> WO ESTIM 1ST 15 MIN: CPT | Performed by: FAMILY MEDICINE

## 2020-12-02 PROCEDURE — 3008F BODY MASS INDEX DOCD: CPT | Performed by: FAMILY MEDICINE

## 2020-12-02 PROCEDURE — 99213 OFFICE O/P EST LOW 20 MIN: CPT | Performed by: FAMILY MEDICINE

## 2020-12-02 PROCEDURE — 3075F SYST BP GE 130 - 139MM HG: CPT | Performed by: FAMILY MEDICINE

## 2020-12-02 PROCEDURE — 97811 ACUP 1/> W/O ESTIM EA ADD 15: CPT | Performed by: FAMILY MEDICINE

## 2020-12-02 NOTE — PROGRESS NOTES
HPI:    Patient ID: Liana Taveras is a 45year old female. HERE FOR F/U BACK PAIN. Continues no neuro deficits. Did not start physical therapy yet. Otherwise ok. Face better       Review of Systems   Constitutional: Negative. Negative for fatigue.    R 15 MIN      Meds This Visit:  Requested Prescriptions      No prescriptions requested or ordered in this encounter       Imaging & Referrals:  None       #[phone]

## 2020-12-02 NOTE — PROCEDURES
Patient tolerated procedure well in excess of 30 minutes was spent with patient   There was no complications all needles were removed.    Patient was advised to rest today and f/u in 1-2 weeks  MENDEL POINTS, EAR POINTS, BACK RISHI POINTS USED

## 2020-12-04 ENCOUNTER — HOSPITAL ENCOUNTER (OUTPATIENT)
Dept: GENERAL RADIOLOGY | Facility: HOSPITAL | Age: 50
Discharge: HOME | End: 2020-12-04
Attending: ORTHOPAEDIC SURGERY
Payer: COMMERCIAL

## 2020-12-04 ENCOUNTER — OFFICE VISIT (OUTPATIENT)
Dept: ORTHOPEDICS CLINIC | Facility: CLINIC | Age: 50
End: 2020-12-04
Payer: COMMERCIAL

## 2020-12-04 VITALS
HEART RATE: 63 BPM | HEIGHT: 64 IN | BODY MASS INDEX: 44.39 KG/M2 | RESPIRATION RATE: 16 BRPM | SYSTOLIC BLOOD PRESSURE: 133 MMHG | DIASTOLIC BLOOD PRESSURE: 94 MMHG | WEIGHT: 260 LBS

## 2020-12-04 DIAGNOSIS — T84.84XA PAINFUL TOTAL KNEE REPLACEMENT, INITIAL ENCOUNTER (HCC): ICD-10-CM

## 2020-12-04 DIAGNOSIS — M17.11 PRIMARY OSTEOARTHRITIS OF RIGHT KNEE: ICD-10-CM

## 2020-12-04 DIAGNOSIS — M25.561 PAIN IN BOTH KNEES, UNSPECIFIED CHRONICITY: ICD-10-CM

## 2020-12-04 DIAGNOSIS — M25.562 PAIN IN BOTH KNEES, UNSPECIFIED CHRONICITY: Primary | ICD-10-CM

## 2020-12-04 DIAGNOSIS — M25.562 PAIN IN BOTH KNEES, UNSPECIFIED CHRONICITY: ICD-10-CM

## 2020-12-04 DIAGNOSIS — M25.561 PAIN IN BOTH KNEES, UNSPECIFIED CHRONICITY: Primary | ICD-10-CM

## 2020-12-04 DIAGNOSIS — Z96.659 PAINFUL TOTAL KNEE REPLACEMENT, INITIAL ENCOUNTER (HCC): ICD-10-CM

## 2020-12-04 PROCEDURE — 3075F SYST BP GE 130 - 139MM HG: CPT | Performed by: ORTHOPAEDIC SURGERY

## 2020-12-04 PROCEDURE — 99204 OFFICE O/P NEW MOD 45 MIN: CPT | Performed by: ORTHOPAEDIC SURGERY

## 2020-12-04 PROCEDURE — 3008F BODY MASS INDEX DOCD: CPT | Performed by: ORTHOPAEDIC SURGERY

## 2020-12-04 PROCEDURE — 20610 DRAIN/INJ JOINT/BURSA W/O US: CPT | Performed by: ORTHOPAEDIC SURGERY

## 2020-12-04 PROCEDURE — 3080F DIAST BP >= 90 MM HG: CPT | Performed by: ORTHOPAEDIC SURGERY

## 2020-12-04 PROCEDURE — 73562 X-RAY EXAM OF KNEE 3: CPT | Performed by: ORTHOPAEDIC SURGERY

## 2020-12-04 RX ORDER — TRIAMCINOLONE ACETONIDE 40 MG/ML
40 INJECTION, SUSPENSION INTRA-ARTICULAR; INTRAMUSCULAR ONCE
Status: COMPLETED | OUTPATIENT
Start: 2020-12-04 | End: 2020-12-04

## 2020-12-04 RX ADMIN — TRIAMCINOLONE ACETONIDE 40 MG: 40 INJECTION, SUSPENSION INTRA-ARTICULAR; INTRAMUSCULAR at 11:30:00

## 2020-12-04 NOTE — PROGRESS NOTES
Per verbal order from Dr. Melita Briseno, draw up and 4ml of 0.5% Marcaine and 1ml of Kenalog 40 for injection into right knee. Oliverio Boswell RN  Patient provided education handout for cortisone injection.
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
## 2020-12-09 ENCOUNTER — OFFICE VISIT (OUTPATIENT)
Dept: FAMILY MEDICINE CLINIC | Facility: CLINIC | Age: 50
End: 2020-12-09
Payer: COMMERCIAL

## 2020-12-09 VITALS
BODY MASS INDEX: 44.39 KG/M2 | WEIGHT: 260 LBS | SYSTOLIC BLOOD PRESSURE: 135 MMHG | DIASTOLIC BLOOD PRESSURE: 86 MMHG | HEIGHT: 64 IN | HEART RATE: 61 BPM

## 2020-12-09 DIAGNOSIS — M54.89 OTHER BACK PAIN, UNSPECIFIED CHRONICITY: Primary | ICD-10-CM

## 2020-12-09 PROCEDURE — 99213 OFFICE O/P EST LOW 20 MIN: CPT | Performed by: FAMILY MEDICINE

## 2020-12-09 PROCEDURE — 97813 ACUP 1/> W/ESTIM 1ST 15 MIN: CPT | Performed by: FAMILY MEDICINE

## 2020-12-09 PROCEDURE — 99212 OFFICE O/P EST SF 10 MIN: CPT | Performed by: FAMILY MEDICINE

## 2020-12-09 PROCEDURE — 97810 ACUP 1/> WO ESTIM 1ST 15 MIN: CPT | Performed by: FAMILY MEDICINE

## 2020-12-09 PROCEDURE — 3079F DIAST BP 80-89 MM HG: CPT | Performed by: FAMILY MEDICINE

## 2020-12-09 PROCEDURE — 3075F SYST BP GE 130 - 139MM HG: CPT | Performed by: FAMILY MEDICINE

## 2020-12-09 PROCEDURE — 3008F BODY MASS INDEX DOCD: CPT | Performed by: FAMILY MEDICINE

## 2020-12-09 NOTE — PROCEDURES
Patient tolerated procedure well in excess of 30 minutes was spent with patient   There was no complications all needles were removed.    Patient was advised to rest today and f/u in 1-2 weeks  Ear points, ihicon,   Local treatment with high frequency stim

## 2020-12-09 NOTE — PROGRESS NOTES
Patient had accupunture - patient tolerated procedure well.  14 needles were recovered after procedure

## 2020-12-09 NOTE — PROGRESS NOTES
HPI:    Patient ID: Shon Ferrer is a 45year old female. F/u back pain. slighly better  Its in the lumbar spine and thoracic spine area      Review of Systems   Constitutional: Negative. Negative for fatigue.    Respiratory: Negative for cough, chest tig Prescriptions      No prescriptions requested or ordered in this encounter       Imaging & Referrals:  None       XR#[id number]

## 2020-12-18 ENCOUNTER — PATIENT MESSAGE (OUTPATIENT)
Dept: FAMILY MEDICINE CLINIC | Facility: CLINIC | Age: 50
End: 2020-12-18

## 2020-12-18 ENCOUNTER — TELEPHONE (OUTPATIENT)
Dept: FAMILY MEDICINE CLINIC | Facility: CLINIC | Age: 50
End: 2020-12-18

## 2020-12-18 NOTE — TELEPHONE ENCOUNTER
Patient scheduled Mychart     Visit Type: 5 [facility] [phone])      12/22/2020  12:15 PM  15 mins. Hawa Joshua MD         [facility]      Patient Comments:   Back pain, headaches, depression, may need blood work.

## 2020-12-18 NOTE — TELEPHONE ENCOUNTER
From: Eileen Christianson  To: Radha Delgado MD  Sent: 12/18/2020  1:38 PM CST  Subject: Visit Follow-up Question    I am scheduled to see you Tuesday,  but M I am in extreme pain with my back. Can bbn you prescribe something until I see you Tuesday.  I cannot handl

## 2020-12-18 NOTE — TELEPHONE ENCOUNTER
From: Eileen Christianson  To: Dayan Chang MD  Sent: 12/18/2020 1:38 PM CST  Subject: Visit Follow-up Question    I am scheduled to see you Tuesday, but M I am in extreme pain with my back. Can bbn you prescribe something until I see you Tuesday.  I cannot handle

## 2020-12-19 RX ORDER — NAPROXEN 500 MG/1
500 TABLET ORAL 2 TIMES DAILY WITH MEALS
Qty: 30 TABLET | Refills: 0 | Status: SHIPPED | OUTPATIENT
Start: 2020-12-19 | End: 2020-12-22

## 2020-12-19 NOTE — TELEPHONE ENCOUNTER
Patient can try naprosyn for pain relief. Sent to listed pharmacy. 1 pill 2 times per day for pain as needed. Heating pad as well.
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
## 2020-12-22 ENCOUNTER — LAB ENCOUNTER (OUTPATIENT)
Dept: LAB | Age: 50
End: 2020-12-22
Attending: FAMILY MEDICINE

## 2020-12-22 ENCOUNTER — OFFICE VISIT (OUTPATIENT)
Dept: FAMILY MEDICINE CLINIC | Facility: CLINIC | Age: 50
End: 2020-12-22
Payer: COMMERCIAL

## 2020-12-22 VITALS
DIASTOLIC BLOOD PRESSURE: 89 MMHG | HEART RATE: 52 BPM | SYSTOLIC BLOOD PRESSURE: 130 MMHG | BODY MASS INDEX: 44.73 KG/M2 | WEIGHT: 262 LBS | HEIGHT: 64 IN

## 2020-12-22 DIAGNOSIS — R01.1 HEART MURMUR: ICD-10-CM

## 2020-12-22 DIAGNOSIS — R00.2 PALPITATION: ICD-10-CM

## 2020-12-22 DIAGNOSIS — R00.2 PALPITATION: Primary | ICD-10-CM

## 2020-12-22 DIAGNOSIS — Z13.6 SCREENING FOR HEART DISEASE: ICD-10-CM

## 2020-12-22 PROCEDURE — 93005 ELECTROCARDIOGRAM TRACING: CPT

## 2020-12-22 PROCEDURE — 99213 OFFICE O/P EST LOW 20 MIN: CPT | Performed by: FAMILY MEDICINE

## 2020-12-22 PROCEDURE — 93010 ELECTROCARDIOGRAM REPORT: CPT | Performed by: FAMILY MEDICINE

## 2020-12-22 RX ORDER — ESCITALOPRAM OXALATE 10 MG/1
TABLET ORAL
Qty: 90 TABLET | Refills: 0 | Status: SHIPPED | OUTPATIENT
Start: 2020-12-22 | End: 2021-01-21

## 2020-12-22 RX ORDER — NAPROXEN 500 MG/1
500 TABLET ORAL 2 TIMES DAILY WITH MEALS
Qty: 60 TABLET | Refills: 0 | Status: SHIPPED | OUTPATIENT
Start: 2020-12-22 | End: 2021-01-21

## 2020-12-22 RX ORDER — ZOLPIDEM TARTRATE 10 MG/1
10 TABLET ORAL NIGHTLY
Qty: 30 TABLET | Refills: 1 | Status: SHIPPED | OUTPATIENT
Start: 2020-12-22 | End: 2021-01-21

## 2020-12-23 NOTE — PROGRESS NOTES
HPI:    Patient ID: Shon Ferrer is a 45year old female. Feels that her heart is pacing times and has some irregular rhytm . No chest pain no shortness of breath      Review of Systems  Constitutional: Negative.   Negative for activity change, appetite ch Tab 60 tablet 0     Sig: Take 1 tablet (500 mg total) by mouth 2 (two) times daily with meals.        Imaging & Referrals:  CARD ECHO 2D DOPPLER (CPT=93306)  CT CALCIUM SCORING       ZQ#[id number]

## 2020-12-29 ENCOUNTER — OFFICE VISIT (OUTPATIENT)
Dept: PHYSICAL THERAPY | Facility: HOSPITAL | Age: 50
End: 2020-12-29
Attending: FAMILY MEDICINE
Payer: COMMERCIAL

## 2020-12-29 DIAGNOSIS — M54.89 OTHER BACK PAIN, UNSPECIFIED CHRONICITY: ICD-10-CM

## 2020-12-29 PROCEDURE — 97110 THERAPEUTIC EXERCISES: CPT

## 2020-12-29 PROCEDURE — 97162 PT EVAL MOD COMPLEX 30 MIN: CPT
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
## 2020-12-30 ENCOUNTER — HOSPITAL ENCOUNTER (OUTPATIENT)
Dept: CV DIAGNOSTICS | Age: 50
Discharge: HOME | End: 2020-12-30
Attending: FAMILY MEDICINE
Payer: COMMERCIAL

## 2020-12-30 DIAGNOSIS — R01.1 HEART MURMUR: ICD-10-CM

## 2020-12-30 PROCEDURE — 93306 TTE W/DOPPLER COMPLETE: CPT | Performed by: FAMILY MEDICINE

## 2020-12-31 ENCOUNTER — OFFICE VISIT (OUTPATIENT)
Dept: PHYSICAL THERAPY | Facility: HOSPITAL | Age: 50
End: 2020-12-31
Attending: FAMILY MEDICINE
Payer: COMMERCIAL

## 2020-12-31 PROCEDURE — 97110 THERAPEUTIC EXERCISES: CPT

## 2020-12-31 PROCEDURE — 97140 MANUAL THERAPY 1/> REGIONS: CPT

## 2020-12-31 NOTE — PROGRESS NOTES
Referring Physician: Dr. Jed Avalos  Diagnosis: Primary osteoarthritis of right knee (M17.11)Other back pain, unspecified chronicity (M54.89)         Date of Onset: 11/17/20  Precautions: Left TKA, HTN,             Subjective: pain level at 7/10 at whole back Tx#: 6/   THerap ex:  -LTR 10x 5 secs  -Open book 10x each left and right sidelying  -SKTC 10x 5 assisted  -piriformis stretch attempted by patient  -Seated ham stretch  4 x 15 secs each  -sidelying quad stretches on right brief         Manual therapy:

## 2021-01-05 ENCOUNTER — OFFICE VISIT (OUTPATIENT)
Dept: PHYSICAL THERAPY | Facility: HOSPITAL | Age: 51
End: 2021-01-05
Attending: FAMILY MEDICINE
Payer: COMMERCIAL

## 2021-01-05 PROCEDURE — 97110 THERAPEUTIC EXERCISES: CPT

## 2021-01-05 PROCEDURE — 97140 MANUAL THERAPY 1/> REGIONS: CPT

## 2021-01-05 NOTE — PROGRESS NOTES
Referring Physician: Dr. Juan Manuel Dia  Diagnosis: Primary osteoarthritis of right knee (M17.11)Other back pain, unspecified chronicity (M54.89)         Date of Onset: 11/17/20  Precautions: Left TKA, HTN,             Subjective: pain level at 5/10 at whole back significant pain or limitation (8-10 visits)    Plan: add prone hip rotation and knee flexion AROM  Date: 12/31/2020  TX#: 2/8 Date: 1/5/21                TX#: 3/8 Date:                 TX#: 4/ Date:                 TX#: 5/ Date:    Tx#: 6/   THerap ex:  -L

## 2021-01-07 ENCOUNTER — OFFICE VISIT (OUTPATIENT)
Dept: PHYSICAL THERAPY | Facility: HOSPITAL | Age: 51
End: 2021-01-07
Attending: FAMILY MEDICINE
Payer: COMMERCIAL

## 2021-01-07 PROCEDURE — 97110 THERAPEUTIC EXERCISES: CPT

## 2021-01-07 PROCEDURE — 97140 MANUAL THERAPY 1/> REGIONS: CPT

## 2021-01-07 NOTE — PROGRESS NOTES
Referring Physician: Dr. Chester Grant  Diagnosis: Primary osteoarthritis of right knee (M17.11)Other back pain, unspecified chronicity (M54.89)         Date of Onset: 11/17/20  Precautions: Left TKA, HTN,             Subjective: pain level at 4/10 at whole back pain or deviation at both knees  · Pt will improve bilateral quad strength to 4+ to or better to ascend 1 flight of stairs reciprocally without significant pain or limitation (8-10 visits)    Plan: assess response to  prone hip rotation and knee flexion AR medius  Grade 2-3 rhythmic harmonics for right long hip long axis distractions and left hip with iliac crest inferior glides in both sidelying            HEP: added hamstring stretch and open book Same HEP HEP added prone hip ER/IR and prone quad stretches

## 2021-01-12 ENCOUNTER — OFFICE VISIT (OUTPATIENT)
Dept: PHYSICAL THERAPY | Facility: HOSPITAL | Age: 51
End: 2021-01-12
Attending: FAMILY MEDICINE
Payer: COMMERCIAL

## 2021-01-12 PROCEDURE — 97140 MANUAL THERAPY 1/> REGIONS: CPT

## 2021-01-12 PROCEDURE — 97110 THERAPEUTIC EXERCISES: CPT

## 2021-01-12 NOTE — PROGRESS NOTES
Referring Physician: Dr. Norma Bonner  Diagnosis: Primary osteoarthritis of right knee (M17.11)Other back pain, unspecified chronicity (M54.89)         Date of Onset: 11/17/20  Precautions: Left TKA, HTN,             Subjective: pain level at 6/10 at bilateral l improve bilateral  knee AROM flexion to > 125 degrees to improve ability to perform squats, stairs (6 visits)  · Gait without significant pain or deviation at both knees  · Pt will improve bilateral quad strength to 4+ to or better to ascend 1 flight of st right long hip long axis distractions and left hip with iliac crest inferior glides in both sidelying  -sidelying left and right grade 2 and 3 inferior glide at iliac crests for rhythmic harmonics Manual Therapy:  -STM to both L-S p/s and right Glut medius

## 2021-01-14 ENCOUNTER — OFFICE VISIT (OUTPATIENT)
Dept: PHYSICAL THERAPY | Facility: HOSPITAL | Age: 51
End: 2021-01-14
Attending: FAMILY MEDICINE
Payer: COMMERCIAL

## 2021-01-14 PROCEDURE — 97110 THERAPEUTIC EXERCISES: CPT

## 2021-01-14 PROCEDURE — 97140 MANUAL THERAPY 1/> REGIONS: CPT

## 2021-01-14 NOTE — PROGRESS NOTES
Referring Physician: Dr. Carmen Levi  Diagnosis: Primary osteoarthritis of right knee (M17.11)Other back pain, unspecified chronicity (M54.89)         Date of Onset: 11/17/20  Precautions: Left TKA, HTN,             Subjective: pain level at 4/10 at bilateral l TX#: 3/8 Date:  1/7/21               TX#: 4/8 Date: 1/12/21                TX#: 5/8 Date: 1/14/21  Tx#: 6/8   THerap ex:  -LTR 10x 5 secs  -Open book 10x each left and right sidelying  -SKTC 10x 5 assisted  -piriformis stretch attempted by ray left hip with iliac crest inferior glides in both sidelying  -sidelying left and right grade 2 and 3 inferior glide at iliac crests for rhythmic harmonics Manual Therapy:  -STM to both L-S p/s and right Glut medius  Grade 2-3 rhythmic harmonics for right l

## 2021-01-18 ENCOUNTER — TELEPHONE (OUTPATIENT)
Dept: PHYSICAL THERAPY | Facility: HOSPITAL | Age: 51
End: 2021-01-18

## 2021-01-19 ENCOUNTER — APPOINTMENT (OUTPATIENT)
Dept: PHYSICAL THERAPY | Facility: HOSPITAL | Age: 51
End: 2021-01-19
Attending: FAMILY MEDICINE
Payer: COMMERCIAL

## 2021-01-21 ENCOUNTER — TELEPHONE (OUTPATIENT)
Dept: PHYSICAL THERAPY | Facility: HOSPITAL | Age: 51
End: 2021-01-21

## 2021-01-21 ENCOUNTER — APPOINTMENT (OUTPATIENT)
Dept: PHYSICAL THERAPY | Facility: HOSPITAL | Age: 51
End: 2021-01-21
Attending: FAMILY MEDICINE
Payer: COMMERCIAL

## 2021-01-22 RX ORDER — ESCITALOPRAM OXALATE 10 MG/1
TABLET ORAL
Qty: 90 TABLET | Refills: 0 | Status: SHIPPED | OUTPATIENT
Start: 2021-01-22 | End: 2021-03-24

## 2021-01-22 RX ORDER — ZOLPIDEM TARTRATE 10 MG/1
10 TABLET ORAL NIGHTLY
Qty: 30 TABLET | Refills: 1 | Status: SHIPPED | OUTPATIENT
Start: 2021-01-22 | End: 2021-03-24

## 2021-01-22 RX ORDER — NAPROXEN 500 MG/1
500 TABLET ORAL 2 TIMES DAILY WITH MEALS
Qty: 60 TABLET | Refills: 0 | Status: SHIPPED | OUTPATIENT
Start: 2021-01-22 | End: 2021-05-26

## 2021-01-22 RX ORDER — DOXYCYCLINE 100 MG/1
CAPSULE ORAL
Qty: 30 CAPSULE | Refills: 1 | Status: SHIPPED | OUTPATIENT
Start: 2021-01-22 | End: 2021-05-26

## 2021-01-26 ENCOUNTER — APPOINTMENT (OUTPATIENT)
Dept: PHYSICAL THERAPY | Facility: HOSPITAL | Age: 51
End: 2021-01-26
Attending: FAMILY MEDICINE
Payer: COMMERCIAL

## 2021-01-28 ENCOUNTER — OFFICE VISIT (OUTPATIENT)
Dept: PHYSICAL THERAPY | Facility: HOSPITAL | Age: 51
End: 2021-01-28
Attending: FAMILY MEDICINE
Payer: COMMERCIAL

## 2021-01-28 PROCEDURE — 97110 THERAPEUTIC EXERCISES: CPT

## 2021-01-28 PROCEDURE — 97140 MANUAL THERAPY 1/> REGIONS: CPT

## 2021-01-28 NOTE — PROGRESS NOTES
Referring Physician: Dr. Pop Fleming  Diagnosis: Primary osteoarthritis of right knee (M17.11)Other back pain, unspecified chronicity (M54.89)         Date of Onset: 11/17/20  Precautions: Left TKA, HTN,             Subjective: pain level at 5/10 at bilateral l transfers and bed mobility  · Patient will be able to demonstrate independent HEP for basic lower extremity and spinal mobility and basic core stabilization program by 5  sessions to assist in being able to achieve safe body mechanics/transfers/posture  · secs  -TA activation with pressure BFB h/l 10 x 5 secs holds  -H/L hip abd 10 x 2 sets clams BTB with pressure BFB  -H/L hip flex 10x with pressure BFB and single leg lift attempt  -assisted piriformis stretches 4 x 15 secs each left and right  -Assisted h glides in both sidelying Manual therapy:  Grade 2-3 rhythmic harmonics for right long hip long axis distractions and left hip with iliac crest inferior glides in both sidelying  -TA activation with SAQ 3#  2x 10   -STM to both L-S p/s and right Glut medius

## 2021-02-02 ENCOUNTER — OFFICE VISIT (OUTPATIENT)
Dept: PHYSICAL THERAPY | Facility: HOSPITAL | Age: 51
End: 2021-02-02
Attending: FAMILY MEDICINE
Payer: COMMERCIAL

## 2021-02-02 PROCEDURE — 97110 THERAPEUTIC EXERCISES: CPT

## 2021-02-02 PROCEDURE — 97140 MANUAL THERAPY 1/> REGIONS: CPT

## 2021-02-02 NOTE — PROGRESS NOTES
Referring Physician: Dr. Steve Solano  Diagnosis: Primary osteoarthritis of right knee (M17.11)Other back pain, unspecified chronicity (M54.89)         Date of Onset: 11/17/20  Precautions: Left TKA, HTN,             Subjective: pain level at 5/10 at bilateral l transfers and bed mobility  · Patient will be able to demonstrate independent HEP for basic lower extremity and spinal mobility and basic core stabilization program by 5  sessions to assist in being able to achieve safe body mechanics/transfers/posture  · 5 secs  -TA activation with pressure BFB h/l 10 x 5 secs holds  -H/L hip abd 10 x 2 sets clams BTB with pressure BFB  -H/L hip flex 10x with pressure BFB and single leg lift attempt  -assisted piriformis stretches 4 x 15 secs each left and right  -Assisted medius  Grade 2-3 rhythmic harmonics for right long hip long axis distractions and left hip with iliac crest inferior glides in both sidelying Manual Therapy:  -STM to both L-S p/s and right Glut medius  Grade 2-3 rhythmic harmonics for right long hip long

## 2021-02-04 ENCOUNTER — APPOINTMENT (OUTPATIENT)
Dept: PHYSICAL THERAPY | Facility: HOSPITAL | Age: 51
End: 2021-02-04
Attending: FAMILY MEDICINE
Payer: COMMERCIAL

## 2021-02-04 ENCOUNTER — TELEPHONE (OUTPATIENT)
Dept: PHYSICAL THERAPY | Facility: HOSPITAL | Age: 51
End: 2021-02-04

## 2021-02-05 NOTE — PROGRESS NOTES
HPI:    Patient ID: Andres Hartman is a 45year old female. Feels more depressed. Her cat  also. States that lexapro is not helping enough. Also is interested in counseling.    21 min spent with the patient on the phone        Review of Systems   Con Prescriptions Disp Refills   • buPROPion HCl ER, XL, 150 MG Oral Tablet 24 Hr 180 tablet 0     Si po every day  every day for 7 days then 1 po bid       Imaging & Referrals:  [address] VASQUEZ#[id number]

## 2021-02-09 ENCOUNTER — OFFICE VISIT (OUTPATIENT)
Dept: PHYSICAL THERAPY | Facility: HOSPITAL | Age: 51
End: 2021-02-09
Attending: FAMILY MEDICINE
Payer: COMMERCIAL

## 2021-02-09 PROCEDURE — 97140 MANUAL THERAPY 1/> REGIONS: CPT

## 2021-02-09 PROCEDURE — 97110 THERAPEUTIC EXERCISES: CPT

## 2021-02-09 NOTE — PROGRESS NOTES
Referring Physician: Dr. Libra Calles  Diagnosis: Primary osteoarthritis of right knee (M17.11)Other back pain, unspecified chronicity (M54.89)         Date of Onset: 11/17/20  Precautions: Left TKA, HTN,             Subjective: pain level at 3/10 at bilateral l ·  Patient will be able to demonstrate good core activation of transfer abdominus by 3 visits  · Patient will be able to demonstrate reduction of bilateral p/s tenderness  to minimal level by 4-5 sessions  · Patient will be able to demonstrate reduction right TFL 15 x  -prone hip IR/ER bilateral hips 10x  -prone knee flexion bilateral to encourage relaxation at TFL  -assisted sidelying hip flexion and rectus femorus stretches 4 x 15 secs bilateral  -assisted piriformis stretches 4 x 15 secs each left and above    Manual therapy:  -STM to both L-S p/s and right Glut medius  Grade 2-3 rhythmic harmonics for right long hip long axis distractions and left hip with iliac crest inferior glides in both sidelying  -sidelying left and right grade 2 and 3 inferior g right quad lumborum in both supine and sidelying                 HEP: added hamstring stretch and open book Same HEP HEP added prone hip ER/IR and prone quad stretches HEP: hold prone hip rotations and knee flexion begin TA activation 100 x 5 secs holds HE

## 2021-02-11 ENCOUNTER — OFFICE VISIT (OUTPATIENT)
Dept: PHYSICAL THERAPY | Facility: HOSPITAL | Age: 51
End: 2021-02-11
Attending: FAMILY MEDICINE
Payer: COMMERCIAL

## 2021-02-11 PROCEDURE — 97140 MANUAL THERAPY 1/> REGIONS: CPT

## 2021-02-11 PROCEDURE — 97110 THERAPEUTIC EXERCISES: CPT

## 2021-02-11 NOTE — PROGRESS NOTES
Referring Physician: Dr. Kanika Santiago  Diagnosis: Primary osteoarthritis of right knee (M17.11)Other back pain, unspecified chronicity (M54.89)         Date of Onset: 11/17/20  Precautions: Left TKA, HTN,   TO SEE ORTHO NEXT WED 2/17/21           Subjective: lucy 8-14 sessions      ·  Patient will be able to demonstrate good core activation of transfer abdominus by 3 visits  · Patient will be able to demonstrate reduction of bilateral p/s tenderness  to minimal level by 4-5 sessions  · Patient will be able to demon and much ms tension at right psoas and right TFL 15 x  -prone hip IR/ER bilateral hips 10x  -prone knee flexion bilateral to encourage relaxation at TFL  -assisted sidelying hip flexion and rectus femorus stretches 4 x 15 secs bilateral  -assisted piriform bilateral   -Remeasure of trunk AROM see above Therap ex:  -assisted Hamstring stretches bilateral 4 x 15 secs  -Assisted SKTC with cues for relaxation 5 x 10 secs bilateral supine  -LTR with STM to both L-S p/s concurrently  -assisted heel slides for RLE inferior glide distractions  -STM to both TFL and RF and right L-S p/s and right quad lumborum in both supine and sidelying Manual therapy  Grade 2-3 rhythmic harmonics for right long hip long axis distractions   -STM to both TFL and RF and right L-S p/s a

## 2021-02-15 ENCOUNTER — TELEPHONE (OUTPATIENT)
Dept: PHYSICAL THERAPY | Age: 51
End: 2021-02-15

## 2021-02-16 ENCOUNTER — APPOINTMENT (OUTPATIENT)
Dept: PHYSICAL THERAPY | Facility: HOSPITAL | Age: 51
End: 2021-02-16
Attending: FAMILY MEDICINE
Payer: COMMERCIAL

## 2021-02-18 ENCOUNTER — APPOINTMENT (OUTPATIENT)
Dept: PHYSICAL THERAPY | Facility: HOSPITAL | Age: 51
End: 2021-02-18
Attending: FAMILY MEDICINE
Payer: COMMERCIAL

## 2021-02-18 ENCOUNTER — TELEPHONE (OUTPATIENT)
Dept: PHYSICAL THERAPY | Facility: HOSPITAL | Age: 51
End: 2021-02-18

## 2021-02-22 ENCOUNTER — OFFICE VISIT (OUTPATIENT)
Dept: ORTHOPEDICS CLINIC | Facility: CLINIC | Age: 51
End: 2021-02-22
Payer: COMMERCIAL

## 2021-02-22 ENCOUNTER — TELEPHONE (OUTPATIENT)
Dept: PHYSICAL THERAPY | Age: 51
End: 2021-02-22

## 2021-02-22 ENCOUNTER — PATIENT MESSAGE (OUTPATIENT)
Dept: ORTHOPEDICS CLINIC | Facility: CLINIC | Age: 51
End: 2021-02-22

## 2021-02-22 VITALS
HEART RATE: 78 BPM | SYSTOLIC BLOOD PRESSURE: 143 MMHG | BODY MASS INDEX: 47 KG/M2 | WEIGHT: 274 LBS | DIASTOLIC BLOOD PRESSURE: 88 MMHG

## 2021-02-22 DIAGNOSIS — M17.11 PRIMARY OSTEOARTHRITIS OF RIGHT KNEE: ICD-10-CM

## 2021-02-22 DIAGNOSIS — T84.84XA PAINFUL TOTAL KNEE REPLACEMENT, INITIAL ENCOUNTER (HCC): Primary | ICD-10-CM

## 2021-02-22 DIAGNOSIS — Z96.659 PAINFUL TOTAL KNEE REPLACEMENT, INITIAL ENCOUNTER (HCC): Primary | ICD-10-CM

## 2021-02-22 PROCEDURE — 3077F SYST BP >= 140 MM HG: CPT | Performed by: ORTHOPAEDIC SURGERY

## 2021-02-22 PROCEDURE — 3079F DIAST BP 80-89 MM HG: CPT | Performed by: ORTHOPAEDIC SURGERY

## 2021-02-22 PROCEDURE — 99214 OFFICE O/P EST MOD 30 MIN: CPT | Performed by: ORTHOPAEDIC SURGERY

## 2021-02-22 PROCEDURE — 20610 DRAIN/INJ JOINT/BURSA W/O US: CPT | Performed by: ORTHOPAEDIC SURGERY

## 2021-02-22 RX ORDER — MELOXICAM 15 MG/1
15 TABLET ORAL DAILY
Qty: 30 TABLET | Refills: 0 | Status: SHIPPED | OUTPATIENT
Start: 2021-02-22 | End: 2021-09-17

## 2021-02-22 RX ORDER — TRIAMCINOLONE ACETONIDE 40 MG/ML
40 INJECTION, SUSPENSION INTRA-ARTICULAR; INTRAMUSCULAR ONCE
Status: COMPLETED | OUTPATIENT
Start: 2021-02-22 | End: 2021-02-22

## 2021-02-22 NOTE — PROGRESS NOTES
NURSING INTAKE COMMENTS: Patient presents with: Follow - Up: regarding bilateral knees. currently in PT states helps while doing thearpy but once home pain returns. per patient still having pain. Rates pain 5/10 at this time.       HPI: This 45year old fe hematemesis, no worsening heartburn, no diarrhea  : no dysuria, no blood in urine, no difficulty urinating, no incontinence  MUSCULOSKELETAL: no other musculoskeletal complaints other than in HPI  NEURO: no numbness or tingling, no weakness or balance di recognition technology. Please excuse any typos.     Caridad Aquino MD

## 2021-02-22 NOTE — TELEPHONE ENCOUNTER
From: Kurtis Christianson  To: Lexus Benton MD  Sent: 2/22/2021 1:27 PM CST  Subject: Visit Follow-up Question    I left before I got the prescription for Meloxicam, can you call that into my pharmacy please? Thank you.

## 2021-02-23 ENCOUNTER — APPOINTMENT (OUTPATIENT)
Dept: PHYSICAL THERAPY | Facility: HOSPITAL | Age: 51
End: 2021-02-23
Attending: FAMILY MEDICINE
Payer: COMMERCIAL

## 2021-02-25 ENCOUNTER — APPOINTMENT (OUTPATIENT)
Dept: PHYSICAL THERAPY | Facility: HOSPITAL | Age: 51
End: 2021-02-25
Attending: FAMILY MEDICINE
Payer: COMMERCIAL

## 2021-03-24 RX ORDER — ESCITALOPRAM OXALATE 10 MG/1
TABLET ORAL
Qty: 90 TABLET | Refills: 0 | Status: SHIPPED | OUTPATIENT
Start: 2021-03-24 | End: 2021-05-26

## 2021-03-24 RX ORDER — ZOLPIDEM TARTRATE 10 MG/1
10 TABLET ORAL NIGHTLY
Qty: 30 TABLET | Refills: 1 | Status: SHIPPED | OUTPATIENT
Start: 2021-03-24 | End: 2021-05-26

## 2021-05-26 ENCOUNTER — PATIENT MESSAGE (OUTPATIENT)
Dept: FAMILY MEDICINE CLINIC | Facility: CLINIC | Age: 51
End: 2021-05-26

## 2021-05-27 RX ORDER — ZOLPIDEM TARTRATE 10 MG/1
10 TABLET ORAL NIGHTLY
Qty: 30 TABLET | Refills: 0 | Status: SHIPPED | OUTPATIENT
Start: 2021-05-27 | End: 2021-06-23

## 2021-05-27 RX ORDER — ESCITALOPRAM OXALATE 10 MG/1
TABLET ORAL
Qty: 90 TABLET | Refills: 0 | Status: SHIPPED | OUTPATIENT
Start: 2021-05-27 | End: 2021-06-23

## 2021-05-27 RX ORDER — DOXYCYCLINE 100 MG/1
CAPSULE ORAL
Qty: 30 CAPSULE | Refills: 0 | Status: SHIPPED | OUTPATIENT
Start: 2021-05-27 | End: 2021-06-23

## 2021-05-27 RX ORDER — NAPROXEN 500 MG/1
500 TABLET ORAL 2 TIMES DAILY WITH MEALS
Qty: 60 TABLET | Refills: 0 | Status: SHIPPED | OUTPATIENT
Start: 2021-05-27 | End: 2021-09-17

## 2021-05-27 NOTE — TELEPHONE ENCOUNTER
Message noted: Chart reviewed and may refill medications as requested times one. Prescription sent to listed pharmacy. Please notify patient.  Further refills as per Dr. GUERRIER

## 2021-05-27 NOTE — TELEPHONE ENCOUNTER
From: Eileen Christianson  To: Crow Frey MD  Sent: 5/26/2021 11:20 PM CDT  Subject: Prescription Question    I have been out of medication for days, I requested refills but no one has answered back.

## 2021-05-29 RX ORDER — ESCITALOPRAM OXALATE 10 MG/1
TABLET ORAL
Qty: 90 TABLET | Refills: 0 | Status: SHIPPED | OUTPATIENT
Start: 2021-05-29 | End: 2021-06-23

## 2021-05-29 RX ORDER — ZOLPIDEM TARTRATE 10 MG/1
10 TABLET ORAL NIGHTLY
Qty: 30 TABLET | Refills: 1 | Status: SHIPPED | OUTPATIENT
Start: 2021-05-29 | End: 2021-06-23

## 2021-06-01 RX ORDER — ZOLPIDEM TARTRATE 10 MG/1
10 TABLET ORAL NIGHTLY
Qty: 30 TABLET | Refills: 1 | OUTPATIENT
Start: 2021-06-01 | End: 2022-05-27

## 2021-06-01 RX ORDER — ESCITALOPRAM OXALATE 10 MG/1
TABLET ORAL
Qty: 90 TABLET | Refills: 0 | OUTPATIENT
Start: 2021-06-01

## 2021-06-01 RX ORDER — NAPROXEN 500 MG/1
500 TABLET ORAL 2 TIMES DAILY WITH MEALS
Qty: 60 TABLET | Refills: 0 | OUTPATIENT
Start: 2021-06-01

## 2021-06-01 RX ORDER — DOXYCYCLINE 100 MG/1
CAPSULE ORAL
Qty: 30 CAPSULE | Refills: 1 | OUTPATIENT
Start: 2021-06-01

## 2021-06-23 ENCOUNTER — LAB ENCOUNTER (OUTPATIENT)
Dept: LAB | Age: 51
End: 2021-06-23
Attending: FAMILY MEDICINE
Payer: MEDICAID

## 2021-06-23 ENCOUNTER — OFFICE VISIT (OUTPATIENT)
Dept: ORTHOPEDICS CLINIC | Facility: CLINIC | Age: 51
End: 2021-06-23
Payer: MEDICAID

## 2021-06-23 ENCOUNTER — OFFICE VISIT (OUTPATIENT)
Dept: FAMILY MEDICINE CLINIC | Facility: CLINIC | Age: 51
End: 2021-06-23
Payer: MEDICAID

## 2021-06-23 VITALS
HEART RATE: 60 BPM | BODY MASS INDEX: 46.95 KG/M2 | SYSTOLIC BLOOD PRESSURE: 134 MMHG | WEIGHT: 275 LBS | DIASTOLIC BLOOD PRESSURE: 85 MMHG | HEIGHT: 64 IN

## 2021-06-23 VITALS — DIASTOLIC BLOOD PRESSURE: 90 MMHG | HEART RATE: 66 BPM | SYSTOLIC BLOOD PRESSURE: 139 MMHG

## 2021-06-23 DIAGNOSIS — M17.11 PRIMARY OSTEOARTHRITIS OF RIGHT KNEE: Primary | ICD-10-CM

## 2021-06-23 DIAGNOSIS — Z00.00 ANNUAL PHYSICAL EXAM: ICD-10-CM

## 2021-06-23 DIAGNOSIS — Z12.31 ENCOUNTER FOR SCREENING MAMMOGRAM FOR BREAST CANCER: Primary | ICD-10-CM

## 2021-06-23 DIAGNOSIS — Z12.11 SCREENING FOR COLON CANCER: ICD-10-CM

## 2021-06-23 DIAGNOSIS — E66.3 PATIENT OVERWEIGHT: ICD-10-CM

## 2021-06-23 PROCEDURE — 80061 LIPID PANEL: CPT

## 2021-06-23 PROCEDURE — 3008F BODY MASS INDEX DOCD: CPT | Performed by: FAMILY MEDICINE

## 2021-06-23 PROCEDURE — 3079F DIAST BP 80-89 MM HG: CPT | Performed by: FAMILY MEDICINE

## 2021-06-23 PROCEDURE — 3075F SYST BP GE 130 - 139MM HG: CPT | Performed by: ORTHOPAEDIC SURGERY

## 2021-06-23 PROCEDURE — 20610 DRAIN/INJ JOINT/BURSA W/O US: CPT | Performed by: PHYSICIAN ASSISTANT

## 2021-06-23 PROCEDURE — 99213 OFFICE O/P EST LOW 20 MIN: CPT | Performed by: FAMILY MEDICINE

## 2021-06-23 PROCEDURE — 3075F SYST BP GE 130 - 139MM HG: CPT | Performed by: FAMILY MEDICINE

## 2021-06-23 PROCEDURE — 99396 PREV VISIT EST AGE 40-64: CPT | Performed by: FAMILY MEDICINE

## 2021-06-23 PROCEDURE — 83036 HEMOGLOBIN GLYCOSYLATED A1C: CPT

## 2021-06-23 PROCEDURE — 3080F DIAST BP >= 90 MM HG: CPT | Performed by: ORTHOPAEDIC SURGERY

## 2021-06-23 PROCEDURE — 84443 ASSAY THYROID STIM HORMONE: CPT

## 2021-06-23 PROCEDURE — 80053 COMPREHEN METABOLIC PANEL: CPT

## 2021-06-23 PROCEDURE — 85025 COMPLETE CBC W/AUTO DIFF WBC: CPT

## 2021-06-23 PROCEDURE — 36415 COLL VENOUS BLD VENIPUNCTURE: CPT

## 2021-06-23 RX ORDER — ESCITALOPRAM OXALATE 20 MG/1
20 TABLET ORAL DAILY
Qty: 90 TABLET | Refills: 3 | Status: SHIPPED | OUTPATIENT
Start: 2021-06-23 | End: 2021-09-17

## 2021-06-23 RX ORDER — TRIAMCINOLONE ACETONIDE 40 MG/ML
40 INJECTION, SUSPENSION INTRA-ARTICULAR; INTRAMUSCULAR ONCE
Status: COMPLETED | OUTPATIENT
Start: 2021-06-23 | End: 2021-06-23

## 2021-06-23 RX ADMIN — TRIAMCINOLONE ACETONIDE 40 MG: 40 INJECTION, SUSPENSION INTRA-ARTICULAR; INTRAMUSCULAR at 12:50:00

## 2021-06-23 NOTE — PROCEDURES
Per verbal Order tierra up 4ml of 0.5% Marcaine and 1ml 40 mg kenalog for Cortisone Injection of Right Knee.

## 2021-06-23 NOTE — PROGRESS NOTES
HPI/Subjective:   Patient ID: Trung Rosario is a 45year old female. Here for annual physical.           History/Other:   Review of Systems   Constitutional: Negative. Negative for activity change, appetite change, diaphoresis and fatigue.    heent dry thr sounds. Pulmonary:      Effort: Pulmonary effort is normal.      Breath sounds: Normal breath sounds. No stridor. No rhonchi. Chest:      Chest wall: No tenderness. Abdominal:      General: Bowel sounds are normal.      Palpations: Abdomen is soft.

## 2021-06-23 NOTE — PROGRESS NOTES
NURSING INTAKE COMMENTS: Patient presents with: Follow - Up: Knee pain, 8/10 pain scale, possible injection      HPI: This 45year old female presents today with complaints of right knee pain and swelling. We last saw her about 4 months ago.   At that radha HPI  NEURO: no numbness or tingling, no weakness or balance disorder  PSYCHE: no depression or anxiety  HEMATOLOGIC: no hx of blood dyscrasia  ENDOCRINE: no thyroid or diabetes issues  ALL/ASTHMA: no new hx of severe allergy or asthma    Physical Examinati

## 2021-06-24 ENCOUNTER — PATIENT MESSAGE (OUTPATIENT)
Dept: FAMILY MEDICINE CLINIC | Facility: CLINIC | Age: 51
End: 2021-06-24

## 2021-06-25 RX ORDER — ZOLPIDEM TARTRATE 10 MG/1
10 TABLET ORAL NIGHTLY
Qty: 30 TABLET | Refills: 1 | Status: SHIPPED | OUTPATIENT
Start: 2021-06-25 | End: 2021-08-24

## 2021-06-25 NOTE — TELEPHONE ENCOUNTER
From: Eileen Christianson  To: Darlean Dance, MD  Sent: 6/24/2021 10:58 PM CDT  Subject: Non-Urgent Medical Question    I was just there. I want to refill my Ambien but it wasn't on my med list. I need it asap! Thank you.

## 2021-08-25 RX ORDER — ZOLPIDEM TARTRATE 10 MG/1
10 TABLET ORAL NIGHTLY
Qty: 30 TABLET | Refills: 1 | Status: SHIPPED | OUTPATIENT
Start: 2021-08-25 | End: 2021-09-17

## 2021-09-17 NOTE — TELEPHONE ENCOUNTER
Dr Britt/HITESH Boles, do you approve refill? Pt requesting refill of Meloxicam 15 mg.  Rx'd on 02/22/21 with # 30 and NRF.    LOV 06/23/21

## 2021-09-20 ENCOUNTER — TELEPHONE (OUTPATIENT)
Dept: FAMILY MEDICINE CLINIC | Facility: CLINIC | Age: 51
End: 2021-09-20

## 2021-09-20 RX ORDER — ZOLPIDEM TARTRATE 10 MG/1
10 TABLET ORAL NIGHTLY
Qty: 30 TABLET | Refills: 1 | Status: SHIPPED | OUTPATIENT
Start: 2021-09-20 | End: 2021-11-16

## 2021-09-20 RX ORDER — MELOXICAM 15 MG/1
15 TABLET ORAL DAILY
Qty: 30 TABLET | Refills: 0 | Status: SHIPPED | OUTPATIENT
Start: 2021-09-20

## 2021-09-20 RX ORDER — ESCITALOPRAM OXALATE 20 MG/1
20 TABLET ORAL DAILY
Qty: 90 TABLET | Refills: 0 | Status: SHIPPED | OUTPATIENT
Start: 2021-09-20 | End: 2021-12-22

## 2021-09-20 RX ORDER — NAPROXEN 500 MG/1
500 TABLET ORAL 2 TIMES DAILY WITH MEALS
Qty: 60 TABLET | Refills: 0 | Status: SHIPPED | OUTPATIENT
Start: 2021-09-20

## 2021-10-20 ENCOUNTER — OFFICE VISIT (OUTPATIENT)
Dept: ORTHOPEDICS CLINIC | Facility: CLINIC | Age: 51
End: 2021-10-20
Payer: MEDICAID

## 2021-10-20 VITALS
HEIGHT: 64 IN | RESPIRATION RATE: 18 BRPM | HEART RATE: 63 BPM | BODY MASS INDEX: 46.95 KG/M2 | SYSTOLIC BLOOD PRESSURE: 136 MMHG | DIASTOLIC BLOOD PRESSURE: 94 MMHG | WEIGHT: 275 LBS

## 2021-10-20 DIAGNOSIS — M17.11 PRIMARY OSTEOARTHRITIS OF RIGHT KNEE: Primary | ICD-10-CM

## 2021-10-20 PROCEDURE — 3008F BODY MASS INDEX DOCD: CPT | Performed by: ORTHOPAEDIC SURGERY

## 2021-10-20 PROCEDURE — 99212 OFFICE O/P EST SF 10 MIN: CPT | Performed by: ORTHOPAEDIC SURGERY

## 2021-10-20 PROCEDURE — 3080F DIAST BP >= 90 MM HG: CPT | Performed by: ORTHOPAEDIC SURGERY

## 2021-10-20 PROCEDURE — 20610 DRAIN/INJ JOINT/BURSA W/O US: CPT | Performed by: PHYSICIAN ASSISTANT

## 2021-10-20 PROCEDURE — 3075F SYST BP GE 130 - 139MM HG: CPT | Performed by: ORTHOPAEDIC SURGERY

## 2021-10-20 RX ORDER — CELECOXIB 200 MG/1
200 CAPSULE ORAL DAILY
Qty: 30 CAPSULE | Refills: 0 | Status: SHIPPED | OUTPATIENT
Start: 2021-10-20 | End: 2021-11-15

## 2021-10-20 RX ORDER — TRIAMCINOLONE ACETONIDE 40 MG/ML
40 INJECTION, SUSPENSION INTRA-ARTICULAR; INTRAMUSCULAR ONCE
Status: COMPLETED | OUTPATIENT
Start: 2021-10-20 | End: 2021-10-20

## 2021-10-20 RX ADMIN — TRIAMCINOLONE ACETONIDE 40 MG: 40 INJECTION, SUSPENSION INTRA-ARTICULAR; INTRAMUSCULAR at 09:20:00

## 2021-10-20 NOTE — PROGRESS NOTES
NURSING INTAKE COMMENTS: Patient presents with: Follow - Up: 45year old female is here today to f/u on R knee pain. Patient denies any recent trauma/injury related to today's visit. Pain scale: 10/10/ at this time.  Patient states she's in constant pain m gain  SKIN: no ulcerated or worrisome skin lesions  EYES:denies blurred vision or double vision  HEENT: denies new nasal congestion, sinus pain or ST  LUNGS: denies shortness of breath  CARDIOVASCULAR: denies chest pain  GI: no hematemesis, no worsening he She will follow-up with us once we have that approval.    The above note was creating using Dragon speech recognition technology. Please excuse any typos.     Michael Mitchell MD

## 2021-11-12 RX ORDER — ZOLPIDEM TARTRATE 10 MG/1
10 TABLET ORAL NIGHTLY
Qty: 30 TABLET | Refills: 1 | OUTPATIENT
Start: 2021-11-12 | End: 2022-11-07

## 2021-11-14 NOTE — TELEPHONE ENCOUNTER
Please review refill protocol failed/ no protocol  Requested Prescriptions   Pending Prescriptions Disp Refills    zolpidem 10 MG Oral Tab 30 tablet 1     Sig: Take 1 tablet (10 mg total) by mouth nightly.         There is no refill protocol information for this order

## 2021-11-15 ENCOUNTER — PATIENT MESSAGE (OUTPATIENT)
Dept: FAMILY MEDICINE CLINIC | Facility: CLINIC | Age: 51
End: 2021-11-15

## 2021-11-15 RX ORDER — ZOLPIDEM TARTRATE 10 MG/1
10 TABLET ORAL NIGHTLY
Qty: 30 TABLET | Refills: 1 | OUTPATIENT
Start: 2021-11-15

## 2021-11-15 RX ORDER — ESCITALOPRAM OXALATE 20 MG/1
20 TABLET ORAL DAILY
Qty: 90 TABLET | Refills: 1 | OUTPATIENT
Start: 2021-11-15 | End: 2022-11-10

## 2021-11-15 RX ORDER — ZOLPIDEM TARTRATE 10 MG/1
10 TABLET ORAL NIGHTLY
Qty: 30 TABLET | Refills: 1 | OUTPATIENT
Start: 2021-11-15 | End: 2022-11-10

## 2021-11-15 RX ORDER — NAPROXEN 500 MG/1
500 TABLET ORAL 2 TIMES DAILY WITH MEALS
Qty: 60 TABLET | Refills: 0 | OUTPATIENT
Start: 2021-11-15

## 2021-11-15 NOTE — TELEPHONE ENCOUNTER
Refill passed per [address] protocol. Please advise on Ambien refill (patient requesting refill as soon as possible)  RN unable to send refill/reaction between Meloxicam and Naproxen   Requested Prescriptions   Pending Prescriptions Disp Refills    escitalopram (LEXAPRO) 20 MG Oral Tab 90 tablet 0     Sig: Take 1 tablet (20 mg total) by mouth daily. Psychiatric Non-Scheduled (Anti-Anxiety) Passed - 11/15/2021 10:39 AM        Passed - Appointment in last 6 or next 3 months           zolpidem 10 MG Oral Tab 30 tablet 1     Sig: Take 1 tablet (10 mg total) by mouth nightly. There is no refill protocol information for this order        naproxen (NAPROSYN) 500 MG Oral Tab 60 tablet 0     Sig: Take 1 tablet (500 mg total) by mouth 2 (two) times daily with meals.         Non-Narcotic Pain Medication Protocol Passed - 11/15/2021 10:39 AM        Passed - Appointment in past 6 or next 3 months            Future Appointments         Provider Department Appt Notes    In 2 days Edward Britt MD [facility], [address], [facility])          Recent Outpatient Visits              3 weeks ago Primary osteoarthritis of right knee    [facility], [address], Tia Connell MD    Office Visit    4 months ago Encounter for screening mammogram for breast cancer    [address], [address] MD    Office Visit    4 months ago Primary osteoarthritis of right knee    [facility], [address], Tia Connell MD    Office Visit    8 months ago Painful total knee replacement, subsequent encounter [facility])    [facility], Edward Arndt MD    Office Visit    9 months ago     [place], [place]    Office Visit

## 2021-11-15 NOTE — TELEPHONE ENCOUNTER
From: Eileen Christianson  To:  Aren Enriquez MD  Sent: 11/15/2021 10:40 AM CST  Subject: Refill    I need my Ambien ASAP PLEASE

## 2021-11-15 NOTE — TELEPHONE ENCOUNTER
Please review; protocol failed/no protocol    Requested Prescriptions   Pending Prescriptions Disp Refills    zolpidem 10 MG Oral Tab 30 tablet 1     Sig: Take 1 tablet (10 mg total) by mouth nightly.         There is no refill protocol information for this order            Recent Outpatient Visits              3 weeks ago Primary osteoarthritis of right knee    [facility], [address], Ceasar Galicia MD    Office Visit    4 months ago Encounter for screening mammogram for breast cancer    [facility], [address]    Office Visit    4 months ago Primary osteoarthritis of right knee    [facility], [address], Ceasar Galicia MD    Office Visit    8 months ago Painful total knee replacement, subsequent encounter [facility])    [facility] Deanne Phillips [facility]    Office Visit    9 months ago     [address], [place]    Office Visit             Future Appointments         Provider Department Appt Notes    In 2 days Nelida Dunbar MD [facility], [address], [facility]

## 2021-11-16 ENCOUNTER — PATIENT MESSAGE (OUTPATIENT)
Dept: FAMILY MEDICINE CLINIC | Facility: CLINIC | Age: 51
End: 2021-11-16

## 2021-11-16 RX ORDER — MELOXICAM 15 MG/1
15 TABLET ORAL DAILY
Qty: 30 TABLET | Refills: 0 | OUTPATIENT
Start: 2021-11-16

## 2021-11-16 RX ORDER — CELECOXIB 200 MG/1
200 CAPSULE ORAL DAILY
Qty: 30 CAPSULE | Refills: 0 | Status: SHIPPED | OUTPATIENT
Start: 2021-11-16

## 2021-11-16 NOTE — TELEPHONE ENCOUNTER
From: Eileen Christianson  To: Jaquelin Bonilla MD  Sent: 11/16/2021 12:20 PM CST  Subject: Refill Of Ambien    Hello,  Can you please refill my Ambien prescription as soon as possible. I have been out since Friday and I have sent many requests.    Thank you,  Eileen Cruz

## 2021-11-16 NOTE — TELEPHONE ENCOUNTER
Last seen on 10/20/21    Here she was told to stop Meloxicam and start Celebrex. It also shows she is taking Naproxen. This is pending denial due to two fact:     1.  11/1 Patient message states she needs to find a new orthopedic doctor due to insurance     2. The last note states she should be taking Celebrex not Meloxicam     Please advise.

## 2021-11-16 NOTE — TELEPHONE ENCOUNTER
Celecoxib pending. Last refill and office visit on 10/20/21    This was ordered to replace Meloxicam during patient's 10/20/21.   It looks like this patient can no longer come to our clinic due to insurance, so not sure if you would like to refill this or

## 2021-11-16 NOTE — TELEPHONE ENCOUNTER
From: Eileen Christianson  To:  Daniel Weems MD  Sent: 11/16/2021 10:27 AM CST  Subject: Refill    I need my refills ASAP pleass!!!!!!

## 2021-11-16 NOTE — TELEPHONE ENCOUNTER
Please review. Protocol failed / No protocol. Patient was last seen 4 months ago. Requested Prescriptions   Pending Prescriptions Disp Refills    zolpidem 10 MG Oral Tab 30 tablet 1     Sig: Take 1 tablet (10 mg total) by mouth nightly.         There is

## 2021-11-17 ENCOUNTER — PATIENT MESSAGE (OUTPATIENT)
Dept: FAMILY MEDICINE CLINIC | Facility: CLINIC | Age: 51
End: 2021-11-17

## 2021-11-17 NOTE — TELEPHONE ENCOUNTER
From: Eileen Christianson  To:  Loida Krishnan MD  Sent: 11/16/2021 10:27 AM CST  Subject: Refill    I need my refills ASAP pleass!!!!!!

## 2021-11-18 RX ORDER — ZOLPIDEM TARTRATE 10 MG/1
10 TABLET ORAL NIGHTLY
Qty: 30 TABLET | Refills: 0 | Status: SHIPPED | OUTPATIENT
Start: 2021-11-18 | End: 2021-11-18

## 2021-11-18 NOTE — TELEPHONE ENCOUNTER
From: Eileen Christianson  To: Crow Frey MD  Sent: 11/17/2021 12:15 PM CST  Subject: Refill     Hello,  I have been messaging since November 11th to get a refill on my Ambien. I have gotten no answer.  Can someone please call in my refill of Ambien to my pharmac

## 2021-11-18 NOTE — TELEPHONE ENCOUNTER
From: Remedios Ferrera  Sent: 11/17/2021 3:50 PM CST  To: Em Rn Triage  Subject: Refill    I need to change my insurance in order to see her, and that is taking awhile. Can she prescribed me about 5 until I can see her?   Thank you

## 2021-11-18 NOTE — TELEPHONE ENCOUNTER
From: Eileen Christianson  To: Maria Elena Smith MD  Sent: 11/17/2021 12:16 PM CST  Subject: Refill     Hello,  I have been messaging since November 11th to get a refill on my Ambien. I have gotten no answer.  Can someone please call in my refill of Ambien to my pharmac

## 2021-11-20 RX ORDER — ZOLPIDEM TARTRATE 10 MG/1
10 TABLET ORAL NIGHTLY
Qty: 5 TABLET | Refills: 0 | Status: SHIPPED | OUTPATIENT
Start: 2021-11-20 | End: 2021-12-22

## 2021-12-22 ENCOUNTER — OFFICE VISIT (OUTPATIENT)
Dept: ORTHOPEDICS CLINIC | Facility: CLINIC | Age: 51
End: 2021-12-22
Payer: COMMERCIAL

## 2021-12-22 ENCOUNTER — PATIENT MESSAGE (OUTPATIENT)
Dept: FAMILY MEDICINE CLINIC | Facility: CLINIC | Age: 51
End: 2021-12-22

## 2021-12-22 ENCOUNTER — OFFICE VISIT (OUTPATIENT)
Dept: FAMILY MEDICINE CLINIC | Facility: CLINIC | Age: 51
End: 2021-12-22
Payer: COMMERCIAL

## 2021-12-22 VITALS — DIASTOLIC BLOOD PRESSURE: 83 MMHG | SYSTOLIC BLOOD PRESSURE: 129 MMHG | HEART RATE: 63 BPM

## 2021-12-22 VITALS
RESPIRATION RATE: 18 BRPM | BODY MASS INDEX: 46.95 KG/M2 | HEART RATE: 54 BPM | HEIGHT: 64 IN | SYSTOLIC BLOOD PRESSURE: 142 MMHG | DIASTOLIC BLOOD PRESSURE: 86 MMHG | WEIGHT: 275 LBS

## 2021-12-22 DIAGNOSIS — F32.A DEPRESSION, UNSPECIFIED DEPRESSION TYPE: ICD-10-CM

## 2021-12-22 DIAGNOSIS — Z12.11 SCREENING FOR COLON CANCER: ICD-10-CM

## 2021-12-22 DIAGNOSIS — M17.11 PRIMARY OSTEOARTHRITIS OF RIGHT KNEE: Primary | ICD-10-CM

## 2021-12-22 DIAGNOSIS — Z88.7 HISTORY OF ALLERGY TO VACCINE: Primary | ICD-10-CM

## 2021-12-22 PROCEDURE — 3079F DIAST BP 80-89 MM HG: CPT | Performed by: PHYSICIAN ASSISTANT

## 2021-12-22 PROCEDURE — 3079F DIAST BP 80-89 MM HG: CPT | Performed by: FAMILY MEDICINE

## 2021-12-22 PROCEDURE — 20610 DRAIN/INJ JOINT/BURSA W/O US: CPT | Performed by: PHYSICIAN ASSISTANT

## 2021-12-22 PROCEDURE — 3074F SYST BP LT 130 MM HG: CPT | Performed by: PHYSICIAN ASSISTANT

## 2021-12-22 PROCEDURE — 3077F SYST BP >= 140 MM HG: CPT | Performed by: FAMILY MEDICINE

## 2021-12-22 PROCEDURE — 3008F BODY MASS INDEX DOCD: CPT | Performed by: FAMILY MEDICINE

## 2021-12-22 PROCEDURE — 99214 OFFICE O/P EST MOD 30 MIN: CPT | Performed by: FAMILY MEDICINE

## 2021-12-22 RX ORDER — ZOLPIDEM TARTRATE 10 MG/1
10 TABLET ORAL NIGHTLY
Qty: 90 TABLET | Refills: 1 | Status: SHIPPED | OUTPATIENT
Start: 2021-12-22 | End: 2022-12-17

## 2021-12-22 RX ORDER — ESCITALOPRAM OXALATE 20 MG/1
20 TABLET ORAL DAILY
Qty: 90 TABLET | Refills: 1 | Status: SHIPPED | OUTPATIENT
Start: 2021-12-22 | End: 2022-12-17

## 2021-12-22 NOTE — PROGRESS NOTES
NURSING INTAKE COMMENTS: Patient presents with:  Knee Pain: Right f/u - states she still has pain 5-10/10 at all the time - states she spoke with her [employer] from accident and they will cover the injection       HPI: This 45year old female presents ST  LUNGS: denies shortness of breath  CARDIOVASCULAR: denies chest pain  GI: no hematemesis, no worsening heartburn, no diarrhea  : no dysuria, no blood in urine, no difficulty urinating, no incontinence  MUSCULOSKELETAL: no other musculoskeletal compla

## 2021-12-22 NOTE — PROGRESS NOTES
Subjective:   Patient ID: Andres Hartman is a 45year old female. Patient here for f/u depression. Doing better . Needs refill on medications.    Patient also needs refills for ambien   Also concerned about allergies to vaccine ( had severe allergic reaction cancer  F/u in 3 months   No orders of the defined types were placed in this encounter.       Meds This Visit:  Requested Prescriptions     Signed Prescriptions Disp Refills   • escitalopram (LEXAPRO) 20 MG Oral Tab 90 tablet 1     Sig: Take 1 tablet (20 mg

## 2021-12-23 NOTE — TELEPHONE ENCOUNTER
From: Eileen Christianson  To: Daniel Weems MD  Sent: 12/22/2021 11:31 AM CST  Subject: Refills    I forgot to ask if my refills for Zolpidem and the antidepressant were called in.  Thanks   Lila Cedillo

## 2021-12-23 NOTE — TELEPHONE ENCOUNTER
[facility] Hospital Medicine  Progress Note    Patient Name: Anai Sal  MRN: [id number]  Patient Class: IP- Inpatient   Admission Date: 2/15/2018  Length of Stay: 2 days  Attending Physician: Nichelle Evans MD  Primary Care Provider: Live Young MD (Inactive)    [place] [facility] Medicine Team: [facility] [place] [place] HOSP MED 1 Stacy Montemayor MD    Subjective:     Principal Problem:Acute combined systolic and diastolic heart failure    HPI:  71 F with HTN, HFrEF 30%, severe mitral regurgitation, CKD IV vs CKD V, Hep C, Breast ca s/p mastectomy, dementia brought in from home by daughter today for hypoxia on pulse oximetry - 85% on room air in AM. Pt was recently admitted at [facility] from 01/30 to 02/06 for CHF exacerbation (at that time pt had SOB). Pt was diuresed and resp status improved; at that time nephrology was consulted who suggested no RRT needed as pt was still making urine. However, discussion of dialysis was made due to pt's acute on chronic kidney disease, which pt declined and palliative care was consulted. Pt was sent home with home hospice. Pt's daughter says that pt has been more edematous and had some dyspnea over the last 4 days and that this morning she checked pt's oxygen saturation with a pulse oximeter at home and it read 85%. Pt was supposed to have a meeting with Dr. Benoit, her nephrologist, but due to her hypoxia pt's daughter brought her to the hospital.    In the ED BNP was 4.6K, pt was satting >95% on 2L NC with no SOB. Pt was given 80mg IV furosemide and admitted to Hospital Medicine for management of CHF exacerbation.    Hospital Course:  02/16/2018 Pt w/ improvement in her breathing and saturating well on Ra. With adequate U/O w/ IV lasix for her kidney function and improved LE edema w/ diuresis. Awaiting daughter's arrival for plan of care/goals of care discussion this afternoon and appropriate discharge planning.  02/17 Patient with improving LE edema and good U/O. Will continue  Response sent to patient. to diurese w/ IV lasix as patient still hypervolemic on exam. Nephrology consulted for evaluation and discussion of Hd. Patient w/ NAEON    Interval History: NAEON. Pt continues to improve w/ high dose IV lasix; Net - 2560 L from last shift; continues to diurese well during this shift. States that her SOB is improving and she is no longer requiring NC O2. Denies any discomfort or pain during the examination.   No other complaints at this time aside from frequent interruptions to her sleep/naps.    Review of Systems   Constitutional: Negative for unexpected weight change.   HENT: Negative for congestion.    Eyes: Negative for visual disturbance.   Respiratory: Negative for choking, chest tightness, shortness of breath and wheezing.    Cardiovascular: Positive for leg swelling (improved from admission). Negative for chest pain.   Gastrointestinal: Negative for abdominal pain.   Musculoskeletal: Negative for arthralgias.   Skin: Negative for wound.   Neurological: Negative for headaches.   Psychiatric/Behavioral: Negative for sleep disturbance.     Objective:     Vital Signs (Most Recent):  Temp: 97.5 °F (36.4 °C) (02/17/18 1134)  Pulse: 61 (02/17/18 1135)  Resp: 20 (02/17/18 1134)  BP: (!) 115/59 (02/17/18 1134)  SpO2: (!) 94 % (02/17/18 1134) Vital Signs (24h Range):  Temp:  [96.7 °F (35.9 °C)-98.1 °F (36.7 °C)] 97.5 °F (36.4 °C)  Pulse:  [45-97] 61  Resp:  [16-23] 20  SpO2:  [91 %-97 %] 94 %  BP: (115-165)/(59-74) 115/59     Weight: 82.4 kg (181 lb 9.6 oz)  Body mass index is 29.31 kg/m².    Physical Exam   Constitutional: She appears well-developed.   HENT:   Head: Normocephalic and atraumatic.   Right Ear: External ear normal.   Left Ear: External ear normal.   Nose: Nose normal.   Eyes: EOM are normal. Pupils are equal, round, and reactive to light.   Neck: No tracheal deviation present. No thyromegaly present.   Cardiovascular: Normal rate.    Murmur heard.  Pulmonary/Chest: Effort normal. No respiratory  distress. She has wheezes (Minimal end-exp wheeze). She has no rales.   B/l basilar crackles noted on exam   Abdominal: Soft. There is no tenderness. No hernia.   Musculoskeletal: She exhibits edema.   2+ b/l LE edema, ascending up to the knees   Neurological: She displays normal reflexes. No cranial nerve deficit.   Skin: No rash noted.   Psychiatric: She has a normal mood and affect. Judgment normal.   Vitals reviewed.        CRANIAL NERVES     CN III, IV, VI   Pupils are equal, round, and reactive to light.  Extraocular motions are normal.        Significant Labs:   CBC:     Recent Labs  Lab 02/16/18 0828 02/17/18  0634   WBC 4.13 3.66*   HGB 8.1* 8.0*   HCT 24.8* 24.4*   * 137*     CMP:     Recent Labs  Lab 02/16/18 0828 02/17/18  0634    142   K 4.7 4.4    101   CO2 25 27   GLU 83 76   BUN 86* 84*   CREATININE 4.2* 4.5*   CALCIUM 9.1 9.2   PROT 7.9 8.0   ALBUMIN 3.3* 3.2*   BILITOT 0.8 0.8   ALKPHOS 91 89   AST 41* 27   ALT 24 21   ANIONGAP 13 14   EGFRNONAA 10.0* 9.2*     Cardiac Markers:   No results for input(s): CKMB, MYOGLOBIN, BNP, TROPISTAT in the last 48 hours.  Magnesium:     Recent Labs  Lab 02/16/18 0828 02/17/18  0634   MG 2.7* 2.6       Significant Imaging: I have reviewed all pertinent imaging results/findings within the past 24 hours.    Assessment/Plan:      * Acute combined systolic and diastolic heart failure    CHFrEF 30% + diastolic dysfunction with right-sided heart failure  - Pt grossly edematous with pitting edema to mid-thigh on admit; however pt's lungs sound clear and she denies having dyspnea  - Given likely fluid overload will diurese with 120 mg lasix IV BID, decreasing doses as appropriate or adding other agents to gauge response, suspect cardiorenal component  - Will continue to hold BB in setting of bradycardia, bidil in setting of ADCHF, will not start ACEI at this time given CANDI  - Pt w/ improved LE pitting edema and adequate U/O for her renal function.  Will continue w/ current diuresis schedule and monitor electrolytes closely         Counseling regarding advanced care planning and goals of care    Pt was previously discharged home with hospice; however, it is unclear whether pt is truly ESRD vs CANDI on CKD and whether she would need to be dialyzed (palliative care was consulted and hospice discussion was initiated due to pt's desire to not be on chronic dialysis)  - Will discuss this with daughter and discuss code status again  - Currently they would like to explore the possibility of HD as this was not discussed extensively with the pt and daughter on previous admission  - Will continue to abide by the pt's and daughters wishes and work w/ the family to provide most appropriate plan of care for the patient         Major neurocognitive disorder    Pt's family friend at bedside (daughter currently not present) stated that pt had been declining in mental status for the past 6-8 months  Daughter appears to be the main caretaker for the patient and is very involved in her treatment plan          Acute renal failure superimposed on stage 4 chronic kidney disease    Likely cardiorenal   - Given clinical volume overload, will aggressively give diuretics and monitor renal function via I/O + daily CMPs  - No abnormalities on UA  - Cr remains at 4.5, increasing, will monitor closely  - Nephrology consulted for possible HD treatment initiation --> agree w/ continuation of  IV diuretic therapy for diuresis as patient is responding well for now   - further goals of care discussion as patient previously noted not wanting chronic dialysis if optimal medical therapy ineffective in fluid management   - no indication for acute RRT at this time   - daily labs, continue Is and Os   - avoid nephrotoxic medication         Chronic gout    - Cont allopurinol at home dose   - No pain complaints at this time         Essential hypertension    - Continue amlodipine, hold toprol in  bradycardia  - SBP labile from 110s-150s           VTE Risk Mitigation         Ordered     High Risk of VTE  Once      02/16/18 1125     heparin (porcine) injection 5,000 Units  Every 8 hours     Route:  Subcutaneous        02/15/18 1447              Stacy Montemayor MD  Department of Hospital Medicine   [facility]                    02/17/2018                             STAFF PHYSICIAN NOTE                                   Attending Attestation for Rounds with Resident  I have reviewed and concur with the resident's history, physical, assessment, and plan.  I have personally interviewed and examined the patient at bedside and agree with the resident's findings.                                  ________________________________________                                     REASON FOR ADMISSION:     Patient is 71 y.o.female    Body mass index is 29.31 kg/m².,  Acute combined systolic and diastolic heart failure

## 2021-12-29 ENCOUNTER — OFFICE VISIT (OUTPATIENT)
Dept: FAMILY MEDICINE CLINIC | Facility: CLINIC | Age: 51
End: 2021-12-29
Payer: COMMERCIAL

## 2021-12-29 VITALS
SYSTOLIC BLOOD PRESSURE: 111 MMHG | TEMPERATURE: 98 F | DIASTOLIC BLOOD PRESSURE: 72 MMHG | HEART RATE: 59 BPM | HEIGHT: 64 IN | BODY MASS INDEX: 45.75 KG/M2 | OXYGEN SATURATION: 99 % | WEIGHT: 268 LBS

## 2021-12-29 DIAGNOSIS — J01.80 OTHER ACUTE SINUSITIS, RECURRENCE NOT SPECIFIED: Primary | ICD-10-CM

## 2021-12-29 PROCEDURE — 3078F DIAST BP <80 MM HG: CPT | Performed by: FAMILY MEDICINE

## 2021-12-29 PROCEDURE — 3074F SYST BP LT 130 MM HG: CPT | Performed by: FAMILY MEDICINE

## 2021-12-29 PROCEDURE — 99213 OFFICE O/P EST LOW 20 MIN: CPT | Performed by: FAMILY MEDICINE

## 2021-12-29 PROCEDURE — 3008F BODY MASS INDEX DOCD: CPT | Performed by: FAMILY MEDICINE

## 2021-12-29 RX ORDER — AMOXICILLIN AND CLAVULANATE POTASSIUM 875; 125 MG/1; MG/1
1 TABLET, FILM COATED ORAL 2 TIMES DAILY
Qty: 20 TABLET | Refills: 0 | Status: SHIPPED | OUTPATIENT
Start: 2021-12-29 | End: 2022-01-08

## 2021-12-29 NOTE — PROGRESS NOTES
Subjective:   Patient ID: Dyan Mak is a 45year old female. Patient with postnasal drip naal congestion no fever no cough      History/Other:   Review of Systems     Constitutional: Negative.   Negative for activity change, appetite change, diaphoresis Visit:  Requested Prescriptions     Signed Prescriptions Disp Refills   • amoxicillin clavulanate 875-125 MG Oral Tab 20 tablet 0     Sig: Take 1 tablet by mouth 2 (two) times daily for 10 days.        Imaging & Referrals:  None

## 2022-01-23 ENCOUNTER — PATIENT MESSAGE (OUTPATIENT)
Dept: ORTHOPEDICS CLINIC | Facility: CLINIC | Age: 52
End: 2022-01-23

## 2022-01-24 NOTE — TELEPHONE ENCOUNTER
From: Africa Christianson  To: Adelina Cates MD  Sent: 1/23/2022 2:34 PM CST  Subject: My appointment!!!!! Why was my appointment on February 14 canceled? ??? It is to billed to [facility] for the accident! I even spoke to someone in person about this! !!

## 2022-02-02 ENCOUNTER — PATIENT MESSAGE (OUTPATIENT)
Dept: ORTHOPEDICS CLINIC | Facility: CLINIC | Age: 52
End: 2022-02-02

## 2022-02-03 NOTE — TELEPHONE ENCOUNTER
From: Clary Christianson  To: Nettie Maldonado MD  Sent: 2/2/2022 8:07 PM CST  Subject: Private message about accident injury     Dr Jackie Campoverde,  My  is trying to contact you to get a written estimate for the knee replacement surgery and rehabilitation costs, without insurance costs, that we had discussed that I will need. The Duraline shot has not worked and I am more and constant pain. I have an appointment to see you February 14th. My  name is Hank Flynn and has left messages with your office. If there is a cost for such a request please let him know and he will pay it. Thank you in advance and I look forward to my visit as I am in much pain.   Sincerely,   Camilo Alcaraz

## 2022-02-14 ENCOUNTER — OFFICE VISIT (OUTPATIENT)
Dept: ORTHOPEDICS CLINIC | Facility: CLINIC | Age: 52
End: 2022-02-14
Payer: COMMERCIAL

## 2022-02-14 ENCOUNTER — HOSPITAL ENCOUNTER (OUTPATIENT)
Dept: GENERAL RADIOLOGY | Facility: HOSPITAL | Age: 52
Discharge: HOME | End: 2022-02-14
Attending: ORTHOPAEDIC SURGERY
Payer: MEDICAID

## 2022-02-14 VITALS — HEART RATE: 62 BPM | SYSTOLIC BLOOD PRESSURE: 130 MMHG | DIASTOLIC BLOOD PRESSURE: 80 MMHG

## 2022-02-14 DIAGNOSIS — Z47.89 ORTHOPEDIC AFTERCARE: Primary | ICD-10-CM

## 2022-02-14 DIAGNOSIS — Z47.89 ORTHOPEDIC AFTERCARE: ICD-10-CM

## 2022-02-14 DIAGNOSIS — E66.01 MORBID OBESITY WITH BMI OF 45.0-49.9, ADULT (HCC): ICD-10-CM

## 2022-02-14 DIAGNOSIS — M17.11 PRIMARY OSTEOARTHRITIS OF RIGHT KNEE: ICD-10-CM

## 2022-02-14 PROCEDURE — 3079F DIAST BP 80-89 MM HG: CPT | Performed by: ORTHOPAEDIC SURGERY

## 2022-02-14 PROCEDURE — 20610 DRAIN/INJ JOINT/BURSA W/O US: CPT | Performed by: ORTHOPAEDIC SURGERY

## 2022-02-14 PROCEDURE — 99213 OFFICE O/P EST LOW 20 MIN: CPT | Performed by: ORTHOPAEDIC SURGERY

## 2022-02-14 PROCEDURE — 73564 X-RAY EXAM KNEE 4 OR MORE: CPT | Performed by: ORTHOPAEDIC SURGERY

## 2022-02-14 PROCEDURE — 3075F SYST BP GE 130 - 139MM HG: CPT | Performed by: ORTHOPAEDIC SURGERY

## 2022-02-14 RX ORDER — TRIAMCINOLONE ACETONIDE 40 MG/ML
40 INJECTION, SUSPENSION INTRA-ARTICULAR; INTRAMUSCULAR ONCE
Status: COMPLETED | OUTPATIENT
Start: 2022-02-14 | End: 2022-02-14

## 2022-02-14 RX ADMIN — TRIAMCINOLONE ACETONIDE 40 MG: 40 INJECTION, SUSPENSION INTRA-ARTICULAR; INTRAMUSCULAR at 12:07:00

## 2022-02-14 NOTE — PROGRESS NOTES
Per verbal order from Dr. Zac Hernandez, draw up and 4ml of 0.5% Marcaine and 1ml of Kenalog 40 for cortisone injection into right knee.  Becky Tucker

## 2022-02-18 NOTE — TELEPHONE ENCOUNTER
Tried to call patient in regards to her [employer] message. LM stating that I will send her a [employer] message as well. Based off of Dr Geni Krabbe Zane Highman: I recommended referral to the bariatric center for weight reduction, pain management center for help managing her pain, and we talked about knee replacement. I told her that given her relatively young age and weight, that I think the best approach is to work on getting her weight down below a BMI of 40 before considering surgery. She understands and agrees with the plan. I gave her a right knee cortisone injection today to help her with her symptom management. [id number] Would be the could IF it would be a Total Knee Arthroplasty, that is just for the surgery code only. She is to call me at  if she has any questions.

## 2022-02-19 ENCOUNTER — TELEPHONE (OUTPATIENT)
Dept: FAMILY MEDICINE CLINIC | Facility: CLINIC | Age: 52
End: 2022-02-19

## 2022-02-19 NOTE — TELEPHONE ENCOUNTER
Received record request and pt signed authorization from [address] [place] [address] [place] [phone] L:[phone]. Faxed/confirmed to medical records, sent for scanning.

## 2022-02-21 ENCOUNTER — PATIENT MESSAGE (OUTPATIENT)
Dept: FAMILY MEDICINE CLINIC | Facility: CLINIC | Age: 52
End: 2022-02-21

## 2022-02-22 ENCOUNTER — PATIENT MESSAGE (OUTPATIENT)
Dept: FAMILY MEDICINE CLINIC | Facility: CLINIC | Age: 52
End: 2022-02-22

## 2022-02-22 RX ORDER — AMOXICILLIN AND CLAVULANATE POTASSIUM 875; 125 MG/1; MG/1
1 TABLET, FILM COATED ORAL 2 TIMES DAILY
Qty: 20 TABLET | Refills: 0 | OUTPATIENT
Start: 2022-02-22 | End: 2022-03-04

## 2022-02-23 ENCOUNTER — TELEMEDICINE (OUTPATIENT)
Dept: FAMILY MEDICINE CLINIC | Facility: CLINIC | Age: 52
End: 2022-02-23

## 2022-02-23 DIAGNOSIS — J01.80 OTHER ACUTE SINUSITIS, RECURRENCE NOT SPECIFIED: Primary | ICD-10-CM

## 2022-02-23 PROCEDURE — 99213 OFFICE O/P EST LOW 20 MIN: CPT | Performed by: FAMILY MEDICINE

## 2022-02-23 RX ORDER — AMOXICILLIN 875 MG/1
875 TABLET, COATED ORAL 2 TIMES DAILY
Qty: 20 TABLET | Refills: 0 | Status: SHIPPED | OUTPATIENT
Start: 2022-02-23

## 2022-02-23 NOTE — TELEPHONE ENCOUNTER
From: Eileen Christianson  To: Courtney Osborn MD  Sent: 2/22/2022 11:09 AM CST  Subject: Joseph Henderson    I was seen by Dr Paul Reyes on December 29th about post nasal Drip and swelling in my face. She prescribed Amoxcillan. I have the same thing again, can a refill be called in?   Thank you,  Hiram Mayen

## 2022-02-23 NOTE — TELEPHONE ENCOUNTER
Future Appointments   Date Time Provider Rosina Velarde   2/23/2022  2:00 PM Guanako Rodriguez MD [facility] [place]

## 2022-04-17 RX ORDER — AMOXICILLIN 875 MG/1
875 TABLET, COATED ORAL 2 TIMES DAILY
Qty: 20 TABLET | Refills: 0 | Status: CANCELLED | OUTPATIENT
Start: 2022-04-17

## 2022-04-18 RX ORDER — AMOXICILLIN 875 MG/1
875 TABLET, COATED ORAL 2 TIMES DAILY
Qty: 20 TABLET | Refills: 0 | Status: CANCELLED | OUTPATIENT
Start: 2022-04-18

## 2022-04-18 RX ORDER — MELOXICAM 15 MG/1
15 TABLET ORAL DAILY
Qty: 30 TABLET | Refills: 0 | OUTPATIENT
Start: 2022-04-18

## 2022-04-18 RX ORDER — ESCITALOPRAM OXALATE 20 MG/1
20 TABLET ORAL DAILY
Qty: 90 TABLET | Refills: 1 | Status: CANCELLED | OUTPATIENT
Start: 2022-04-18 | End: 2023-04-13

## 2022-04-18 RX ORDER — NAPROXEN 500 MG/1
500 TABLET ORAL 2 TIMES DAILY WITH MEALS
Qty: 60 TABLET | Refills: 0 | Status: CANCELLED | OUTPATIENT
Start: 2022-04-18

## 2022-04-18 RX ORDER — CELECOXIB 200 MG/1
200 CAPSULE ORAL DAILY
Qty: 30 CAPSULE | Refills: 0 | OUTPATIENT
Start: 2022-04-18

## 2022-04-18 RX ORDER — ZOLPIDEM TARTRATE 10 MG/1
10 TABLET ORAL NIGHTLY
Qty: 90 TABLET | Refills: 1 | Status: CANCELLED | OUTPATIENT
Start: 2022-04-18 | End: 2023-04-13

## 2022-04-18 NOTE — TELEPHONE ENCOUNTER
DR Carlos Potts: please review all medication refills. Requested Prescriptions   Pending Prescriptions Disp Refills    naproxen (NAPROSYN) 500 MG Oral Tab 60 tablet 0     Sig: Take 1 tablet (500 mg total) by mouth 2 (two) times daily with meals. Non-Narcotic Pain Medication Protocol Passed - 4/18/2022  5:50 PM        Passed - Appointment in past 6 or next 3 months           escitalopram (LEXAPRO) 20 MG Oral Tab 90 tablet 1     Sig: Take 1 tablet (20 mg total) by mouth daily. Psychiatric Non-Scheduled (Anti-Anxiety) Passed - 4/18/2022  5:50 PM        Passed - Appointment in last 6 or next 3 months           zolpidem 10 MG Oral Tab 90 tablet 1     Sig: Take 1 tablet (10 mg total) by mouth nightly.         There is no refill protocol information for this order            Recent Outpatient Visits              1 month ago Other acute sinusitis, recurrence not specified    [facility], [facility], [address] [place] [address], MD Latoya    Telemedicine    2 months ago Orthopedic aftercare    [facility] Lauren Worley [facility]    Office Visit    3 months ago Other acute sinusitis, recurrence not specified    Donald Rey MD    Office Visit    3 months ago History of allergy to vaccine    Donald Rey MD    Office Visit    3 months ago Primary osteoarthritis of right knee    [facility] for [place] Jury, Lorena Sager, MD    Office Visit

## 2022-04-19 RX ORDER — ESCITALOPRAM OXALATE 20 MG/1
20 TABLET ORAL DAILY
Qty: 90 TABLET | Refills: 0 | Status: SHIPPED | OUTPATIENT
Start: 2022-04-19 | End: 2022-08-05

## 2022-04-19 RX ORDER — NAPROXEN 500 MG/1
500 TABLET ORAL 2 TIMES DAILY WITH MEALS
Qty: 60 TABLET | Refills: 0 | Status: SHIPPED | OUTPATIENT
Start: 2022-04-19 | End: 2022-10-19

## 2022-04-19 RX ORDER — ZOLPIDEM TARTRATE 10 MG/1
10 TABLET ORAL NIGHTLY
Qty: 90 TABLET | Refills: 0 | Status: SHIPPED | OUTPATIENT
Start: 2022-04-19 | End: 2022-08-05

## 2022-04-19 NOTE — TELEPHONE ENCOUNTER
Call center please call and schedule an appointment. Thank You. [employer] message sent to the patient.         Authorized by: Maria Victoria Koehler MD     Non-formulary    To pharmacy: Needs to make an apt for further refills

## 2022-06-02 RX ORDER — MELOXICAM 15 MG/1
15 TABLET ORAL DAILY
Qty: 30 TABLET | Refills: 0 | OUTPATIENT
Start: 2022-06-02

## 2022-06-02 RX ORDER — CELECOXIB 200 MG/1
200 CAPSULE ORAL DAILY
Qty: 30 CAPSULE | Refills: 0 | OUTPATIENT
Start: 2022-06-02

## 2022-06-02 NOTE — TELEPHONE ENCOUNTER
Patient is on Naprosyn per PCP.  3 NSAIDs not appropriate. Follow up scheduled 6/13.   Rxs declined to pharmacy

## 2022-06-13 ENCOUNTER — OFFICE VISIT (OUTPATIENT)
Dept: ORTHOPEDICS CLINIC | Facility: CLINIC | Age: 52
End: 2022-06-13
Payer: COMMERCIAL

## 2022-06-13 DIAGNOSIS — M17.11 PRIMARY OSTEOARTHRITIS OF RIGHT KNEE: Primary | ICD-10-CM

## 2022-06-13 RX ORDER — TRIAMCINOLONE ACETONIDE 40 MG/ML
40 INJECTION, SUSPENSION INTRA-ARTICULAR; INTRAMUSCULAR ONCE
Status: COMPLETED | OUTPATIENT
Start: 2022-06-13 | End: 2022-06-13

## 2022-06-13 RX ADMIN — TRIAMCINOLONE ACETONIDE 40 MG: 40 INJECTION, SUSPENSION INTRA-ARTICULAR; INTRAMUSCULAR at 11:21:00

## 2022-06-13 NOTE — PROGRESS NOTES
Per verbal order from Dr. Gil Zuniga, draw up and 4ml of 0.5% Marcaine and 1ml of Kenalog 40 for injection into Right knee.   Katlyn Kumar

## 2022-06-28 ENCOUNTER — TELEPHONE (OUTPATIENT)
Dept: FAMILY MEDICINE CLINIC | Facility: CLINIC | Age: 52
End: 2022-06-28

## 2022-06-28 RX ORDER — METOPROLOL SUCCINATE 50 MG/1
50 TABLET, EXTENDED RELEASE ORAL DAILY
Qty: 90 TABLET | Refills: 0 | Status: SHIPPED | OUTPATIENT
Start: 2022-06-28 | End: 2023-06-23

## 2022-06-28 NOTE — TELEPHONE ENCOUNTER
Spoke with patient again ( verified)--reports nurse at recent ortho visit checked b/p 5 times--both arms--upper arm, wrist and lowest b/p was 158/107. Patient reports headaches all day, every day with intermittent dizziness/blurred vision--denies acute vision changes or eye pain, denies chest pain or shortness of breath--speaking in clear, full sentences. .    She has checked her b/p at home--is never under 160/100--declines ER at this time, but her b/p has never been this high--does report more stress recently. Offered appt today or later this week with another provider--patient declines appt, as her insurance is not active until 2022--only wants to see Dr. Savita Beltran to discuss. Patient states she did have appt Friday with Dr. Savita Beltran, but appt cancelled, as PCP will not be in office.  Friday's appt rescheduled for Tuesday--extended 30 minute appt per patient request.    Sent to Dr. Savita Beltran as David Brown and any further recommendations--pharmacy verified, if Rx to pharmacy today appropriate    Future Appointments   Date Time Provider Rosina Velarde   2022 11:00 AM Roddy Hilliard MD [facility]

## 2022-06-28 NOTE — TELEPHONE ENCOUNTER
Spoke with patient---transferred from hospitals--patient needs to reschedule appt with Dr. Jason Nicole, as her insurance will not be active until 7/01/2022. Patient reports when she saw Dr. Mg Dover in office recently, her blood pressure was 160s/100s (not documented in 6/13/2022 office notes). Patient reports headaches, but then asked for this RN to call her back in one hour, as she is currently at another appt.

## 2022-06-29 NOTE — TELEPHONE ENCOUNTER
Please tell that I sent medication to the pharmacy.    It is to be taken at night   She is to follow up next week in the office

## 2022-06-29 NOTE — TELEPHONE ENCOUNTER
Left message to call back for Dr. Iesha Arriaza recommendations below--sent [employer] message of same

## 2022-07-05 ENCOUNTER — OFFICE VISIT (OUTPATIENT)
Dept: FAMILY MEDICINE CLINIC | Facility: CLINIC | Age: 52
End: 2022-07-05
Payer: MEDICAID

## 2022-07-05 ENCOUNTER — LAB ENCOUNTER (OUTPATIENT)
Dept: LAB | Age: 52
End: 2022-07-05
Attending: FAMILY MEDICINE
Payer: MEDICAID

## 2022-07-05 VITALS
SYSTOLIC BLOOD PRESSURE: 126 MMHG | DIASTOLIC BLOOD PRESSURE: 83 MMHG | BODY MASS INDEX: 45.24 KG/M2 | HEIGHT: 64 IN | HEART RATE: 60 BPM | WEIGHT: 265 LBS

## 2022-07-05 DIAGNOSIS — Z12.31 SCREENING MAMMOGRAM FOR BREAST CANCER: ICD-10-CM

## 2022-07-05 DIAGNOSIS — Z00.00 ANNUAL PHYSICAL EXAM: ICD-10-CM

## 2022-07-05 DIAGNOSIS — E66.3 PATIENT OVERWEIGHT: Primary | ICD-10-CM

## 2022-07-05 DIAGNOSIS — Z12.11 SCREENING FOR COLON CANCER: ICD-10-CM

## 2022-07-05 DIAGNOSIS — E66.3 PATIENT OVERWEIGHT: ICD-10-CM

## 2022-07-05 LAB
ALBUMIN SERPL-MCNC: 3.4 G/DL (ref 3.4–5)
ALBUMIN/GLOB SERPL: 0.9 {RATIO} (ref 1–2)
ALP LIVER SERPL-CCNC: 64 U/L
ALT SERPL-CCNC: 13 U/L
ANION GAP SERPL CALC-SCNC: 7 MMOL/L (ref 0–18)
AST SERPL-CCNC: 9 U/L (ref 15–37)
BASOPHILS # BLD AUTO: 0.06 X10(3) UL (ref 0–0.2)
BASOPHILS NFR BLD AUTO: 0.9 %
BILIRUB SERPL-MCNC: 1 MG/DL (ref 0.1–2)
BUN BLD-MCNC: 13 MG/DL (ref 7–18)
BUN/CREAT SERPL: 13.1 (ref 10–20)
CALCIUM BLD-MCNC: 8.8 MG/DL (ref 8.5–10.1)
CHLORIDE SERPL-SCNC: 107 MMOL/L (ref 98–112)
CHOLEST SERPL-MCNC: 181 MG/DL (ref ?–200)
CO2 SERPL-SCNC: 28 MMOL/L (ref 21–32)
CREAT BLD-MCNC: 0.99 MG/DL
DEPRECATED RDW RBC AUTO: 43.7 FL (ref 35.1–46.3)
EOSINOPHIL # BLD AUTO: 0.46 X10(3) UL (ref 0–0.7)
EOSINOPHIL NFR BLD AUTO: 7.2 %
ERYTHROCYTE [DISTWIDTH] IN BLOOD BY AUTOMATED COUNT: 13.5 % (ref 11–15)
EST. AVERAGE GLUCOSE BLD GHB EST-MCNC: 105 MG/DL (ref 68–126)
FASTING PATIENT LIPID ANSWER: YES
FASTING STATUS PATIENT QL REPORTED: YES
GLOBULIN PLAS-MCNC: 3.8 G/DL (ref 2.8–4.4)
GLUCOSE BLD-MCNC: 97 MG/DL (ref 70–99)
HBA1C MFR BLD: 5.3 % (ref ?–5.7)
HCT VFR BLD AUTO: 46.1 %
HDLC SERPL-MCNC: 50 MG/DL (ref 40–59)
HGB BLD-MCNC: 14.4 G/DL
IMM GRANULOCYTES # BLD AUTO: 0.02 X10(3) UL (ref 0–1)
IMM GRANULOCYTES NFR BLD: 0.3 %
LDLC SERPL CALC-MCNC: 108 MG/DL (ref ?–100)
LYMPHOCYTES # BLD AUTO: 1.86 X10(3) UL (ref 1–4)
LYMPHOCYTES NFR BLD AUTO: 29.1 %
MCH RBC QN AUTO: 27.4 PG (ref 26–34)
MCHC RBC AUTO-ENTMCNC: 31.2 G/DL (ref 31–37)
MCV RBC AUTO: 87.6 FL
MONOCYTES # BLD AUTO: 0.44 X10(3) UL (ref 0.1–1)
MONOCYTES NFR BLD AUTO: 6.9 %
NEUTROPHILS # BLD AUTO: 3.56 X10 (3) UL (ref 1.5–7.7)
NEUTROPHILS # BLD AUTO: 3.56 X10(3) UL (ref 1.5–7.7)
NEUTROPHILS NFR BLD AUTO: 55.6 %
NONHDLC SERPL-MCNC: 131 MG/DL (ref ?–130)
OSMOLALITY SERPL CALC.SUM OF ELEC: 294 MOSM/KG (ref 275–295)
PLATELET # BLD AUTO: 241 10(3)UL (ref 150–450)
POTASSIUM SERPL-SCNC: 4.8 MMOL/L (ref 3.5–5.1)
PROT SERPL-MCNC: 7.2 G/DL (ref 6.4–8.2)
RBC # BLD AUTO: 5.26 X10(6)UL
SODIUM SERPL-SCNC: 142 MMOL/L (ref 136–145)
TRIGL SERPL-MCNC: 130 MG/DL (ref 30–149)
TSI SER-ACNC: 1.53 MIU/ML (ref 0.36–3.74)
VLDLC SERPL CALC-MCNC: 22 MG/DL (ref 0–30)
WBC # BLD AUTO: 6.4 X10(3) UL (ref 4–11)

## 2022-07-05 PROCEDURE — 36415 COLL VENOUS BLD VENIPUNCTURE: CPT

## 2022-07-05 PROCEDURE — 85025 COMPLETE CBC W/AUTO DIFF WBC: CPT

## 2022-07-05 PROCEDURE — 83036 HEMOGLOBIN GLYCOSYLATED A1C: CPT

## 2022-07-05 PROCEDURE — 80053 COMPREHEN METABOLIC PANEL: CPT

## 2022-07-05 PROCEDURE — 84443 ASSAY THYROID STIM HORMONE: CPT

## 2022-07-05 PROCEDURE — 99214 OFFICE O/P EST MOD 30 MIN: CPT | Performed by: FAMILY MEDICINE

## 2022-07-05 PROCEDURE — 3008F BODY MASS INDEX DOCD: CPT | Performed by: FAMILY MEDICINE

## 2022-07-05 PROCEDURE — 3079F DIAST BP 80-89 MM HG: CPT | Performed by: FAMILY MEDICINE

## 2022-07-05 PROCEDURE — 80061 LIPID PANEL: CPT

## 2022-07-05 PROCEDURE — 3074F SYST BP LT 130 MM HG: CPT | Performed by: FAMILY MEDICINE

## 2022-07-05 RX ORDER — TERBINAFINE HYDROCHLORIDE 250 MG/1
250 TABLET ORAL DAILY
Qty: 7 TABLET | Refills: 0 | Status: SHIPPED | OUTPATIENT
Start: 2022-07-05 | End: 2022-09-27

## 2022-08-05 ENCOUNTER — OFFICE VISIT (OUTPATIENT)
Dept: OTOLARYNGOLOGY | Facility: CLINIC | Age: 52
End: 2022-08-05
Payer: MEDICAID

## 2022-08-05 ENCOUNTER — OFFICE VISIT (OUTPATIENT)
Dept: AUDIOLOGY | Facility: CLINIC | Age: 52
End: 2022-08-05
Payer: MEDICAID

## 2022-08-05 VITALS — WEIGHT: 265 LBS | HEIGHT: 64 IN | TEMPERATURE: 98 F | BODY MASS INDEX: 45.24 KG/M2

## 2022-08-05 DIAGNOSIS — H90.3 BILATERAL SENSORINEURAL HEARING LOSS: Primary | ICD-10-CM

## 2022-08-05 DIAGNOSIS — H90.3 SENSORINEURAL HEARING LOSS (SNHL) OF BOTH EARS: Primary | ICD-10-CM

## 2022-08-05 DIAGNOSIS — H90.6 MIXED HEARING LOSS, BILATERAL: ICD-10-CM

## 2022-08-05 PROCEDURE — 92557 COMPREHENSIVE HEARING TEST: CPT | Performed by: AUDIOLOGIST

## 2022-08-05 PROCEDURE — 92567 TYMPANOMETRY: CPT | Performed by: AUDIOLOGIST

## 2022-08-05 PROCEDURE — 3008F BODY MASS INDEX DOCD: CPT | Performed by: OTOLARYNGOLOGY

## 2022-08-05 PROCEDURE — 99203 OFFICE O/P NEW LOW 30 MIN: CPT | Performed by: OTOLARYNGOLOGY

## 2022-08-05 RX ORDER — ESCITALOPRAM OXALATE 20 MG/1
20 TABLET ORAL DAILY
Qty: 90 TABLET | Refills: 0 | Status: SHIPPED | OUTPATIENT
Start: 2022-08-05 | End: 2023-07-31

## 2022-08-05 RX ORDER — ZOLPIDEM TARTRATE 10 MG/1
10 TABLET ORAL NIGHTLY
Qty: 90 TABLET | Refills: 0 | Status: SHIPPED | OUTPATIENT
Start: 2022-08-05 | End: 2023-07-31

## 2022-08-05 RX ORDER — METOPROLOL SUCCINATE 50 MG/1
50 TABLET, EXTENDED RELEASE ORAL DAILY
Qty: 90 TABLET | Refills: 0 | Status: SHIPPED | OUTPATIENT
Start: 2022-08-05 | End: 2023-07-31

## 2022-08-05 RX ORDER — TERBINAFINE HYDROCHLORIDE 250 MG/1
250 TABLET ORAL DAILY
Qty: 7 TABLET | Refills: 0 | Status: SHIPPED | OUTPATIENT
Start: 2022-08-05 | End: 2022-10-28

## 2022-08-24 ENCOUNTER — PATIENT MESSAGE (OUTPATIENT)
Dept: OTOLARYNGOLOGY | Facility: CLINIC | Age: 52
End: 2022-08-24

## 2022-08-24 NOTE — TELEPHONE ENCOUNTER
From: Martin Christianson  To: Cory Matthews. Billie Ocampo MD  Sent: 8/24/2022 12:07 PM CDT  Subject: Hearing Aids    I have an appointment at Audiology at [address] [place] [address] [address], [place] for my hearing aids from [facility], can you please fax them the doctors order and my hearing tests please? My appointment is 8am tomorrow Thursday August 25th.   Thank you,  Faby Zimmer

## 2022-08-26 ENCOUNTER — PATIENT MESSAGE (OUTPATIENT)
Dept: ORTHOPEDICS CLINIC | Facility: CLINIC | Age: 52
End: 2022-08-26

## 2022-08-31 ENCOUNTER — HOSPITAL ENCOUNTER (OUTPATIENT)
Dept: GENERAL RADIOLOGY | Facility: HOSPITAL | Age: 52
Discharge: HOME | End: 2022-08-31
Attending: PHYSICIAN ASSISTANT
Payer: COMMERCIAL

## 2022-08-31 ENCOUNTER — OFFICE VISIT (OUTPATIENT)
Dept: ORTHOPEDICS CLINIC | Facility: CLINIC | Age: 52
End: 2022-08-31
Payer: MEDICAID

## 2022-08-31 ENCOUNTER — TELEPHONE (OUTPATIENT)
Dept: FAMILY MEDICINE CLINIC | Facility: CLINIC | Age: 52
End: 2022-08-31

## 2022-08-31 VITALS — DIASTOLIC BLOOD PRESSURE: 95 MMHG | SYSTOLIC BLOOD PRESSURE: 150 MMHG | HEART RATE: 64 BPM

## 2022-08-31 DIAGNOSIS — G89.29 CHRONIC PAIN OF RIGHT KNEE: ICD-10-CM

## 2022-08-31 DIAGNOSIS — M17.11 PRIMARY OSTEOARTHRITIS OF RIGHT KNEE: Primary | ICD-10-CM

## 2022-08-31 DIAGNOSIS — M25.561 CHRONIC PAIN OF RIGHT KNEE: ICD-10-CM

## 2022-08-31 PROCEDURE — 3080F DIAST BP >= 90 MM HG: CPT | Performed by: PHYSICIAN ASSISTANT

## 2022-08-31 PROCEDURE — 3077F SYST BP >= 140 MM HG: CPT | Performed by: PHYSICIAN ASSISTANT

## 2022-08-31 PROCEDURE — 20610 DRAIN/INJ JOINT/BURSA W/O US: CPT | Performed by: PHYSICIAN ASSISTANT

## 2022-08-31 PROCEDURE — 73562 X-RAY EXAM OF KNEE 3: CPT | Performed by: PHYSICIAN ASSISTANT

## 2022-08-31 RX ORDER — TRIAMCINOLONE ACETONIDE 40 MG/ML
40 INJECTION, SUSPENSION INTRA-ARTICULAR; INTRAMUSCULAR ONCE
Status: COMPLETED | OUTPATIENT
Start: 2022-08-31 | End: 2022-08-31

## 2022-08-31 RX ADMIN — TRIAMCINOLONE ACETONIDE 40 MG: 40 INJECTION, SUSPENSION INTRA-ARTICULAR; INTRAMUSCULAR at 11:49:00

## 2022-08-31 NOTE — PROGRESS NOTES
Per verbal order from Dr. Lobito De La Rosa, draw up and 4ml of 0.5% Marcaine and 1ml of Kenalog 40 for injection into Right knee.   Bernarda Bailey

## 2022-08-31 NOTE — TELEPHONE ENCOUNTER
Pt made an appointment to be seen with Dr. Alissa Bosch on 9/7 and on the notes stated \"I think I might have cancer.  I'm losing weight, have no appetite, my body just does not want to work\"  Please advise

## 2022-09-01 NOTE — TELEPHONE ENCOUNTER
Second Call attempt. Left Voicemail to call back our office. Office phone number provided with office hours. Advised check [employer] message.          Future Appointments   Date Time Provider Rosina Velarde   9/7/2022 10:15 AM Jessica Lane MD [facility]   10/5/2022  9:30 AM Donna Britt MD [facility] [address]   11/18/2022  9:00 AM Patti Velasco MD [address] [place] [address]

## 2022-09-02 NOTE — TELEPHONE ENCOUNTER
Action Requested: Summary for Provider     []  Critical Lab, Recommendations Needed  [] Need Additional Advice  []   FYI    []   Need Orders  [] Need Medications Sent to Pharmacy  []  Other     SUMMARY: Pt declines ER or sooner OV. Pt scheduled herself online for 9/7/22 with Dr Frantz Lamb. Spoke with pt who states for the last two weeks has diarrhea with streaks of blood, loss of appetite, extreme weakness, \"lost several pounds in 2 weeks\"  Pt also mentioned \"croup\" like cough for several months. Pt denies chest pain, SOB, black tarry stools, nausea/emesis, abd pain, wheezing, fever    Pt states she was in ER last week for knee pain and was tested for covid which came back negative. Per pt she did not mention symptoms to ER staff only knee pain. Advised ER due to extreme weakness and pt declines, advised sooner OV and pt declines. Advised pt of seriousness of symptoms and need for sooner eval.  Pt states wants to see Dr Frantz Lamb only and due to work schedule can only come on 9/7/22 for eval.    Pt advised to go to ER if symptoms worsen. Advised pt to stay hydrated with water and gatorade.       Dr Frantz Lamb, please advise if ok for pt to wait for eval 9/7/22      Reason for call: Acute  Onset: Data Unavailable

## 2022-09-05 NOTE — TELEPHONE ENCOUNTER
[employer] message sent.      Future Appointments   Date Time Provider Rosina Velarde   9/7/2022 10:15 AM Imtiaz Madrid MD [facility]   10/5/2022  9:30 AM Kendra Britt MD [facility]   11/18/2022  9:00 AM Vanessa Rangel MD [address] [place]

## 2022-09-29 ENCOUNTER — TELEPHONE (OUTPATIENT)
Dept: ORTHOPEDICS CLINIC | Facility: CLINIC | Age: 52
End: 2022-09-29

## 2022-09-29 NOTE — TELEPHONE ENCOUNTER
Pt called stating pt was referred to the pain clinic. Pt is there now and needs the referral faxed. Call transferred to the nurse.

## 2022-10-26 ENCOUNTER — TELEPHONE (OUTPATIENT)
Dept: FAMILY MEDICINE CLINIC | Facility: CLINIC | Age: 52
End: 2022-10-26

## 2022-10-27 RX ORDER — TERBINAFINE HYDROCHLORIDE 250 MG/1
250 TABLET ORAL DAILY
Qty: 7 TABLET | Refills: 0 | Status: SHIPPED | OUTPATIENT
Start: 2022-10-27 | End: 2023-01-19

## 2022-10-27 RX ORDER — CELECOXIB 200 MG/1
200 CAPSULE ORAL DAILY
Qty: 30 CAPSULE | Refills: 0 | Status: SHIPPED | OUTPATIENT
Start: 2022-10-27

## 2022-10-27 RX ORDER — ESCITALOPRAM OXALATE 20 MG/1
20 TABLET ORAL DAILY
Qty: 90 TABLET | Refills: 0 | Status: SHIPPED | OUTPATIENT
Start: 2022-10-27 | End: 2023-10-22

## 2022-10-27 RX ORDER — ZOLPIDEM TARTRATE 10 MG/1
10 TABLET ORAL NIGHTLY
Qty: 90 TABLET | Refills: 0 | Status: SHIPPED | OUTPATIENT
Start: 2022-10-27 | End: 2022-10-31

## 2022-10-27 RX ORDER — METOPROLOL SUCCINATE 50 MG/1
50 TABLET, EXTENDED RELEASE ORAL DAILY
Qty: 90 TABLET | Refills: 0 | Status: SHIPPED | OUTPATIENT
Start: 2022-10-27 | End: 2023-10-22

## 2022-10-28 RX ORDER — ZOLPIDEM TARTRATE 10 MG/1
10 TABLET ORAL NIGHTLY
Qty: 90 TABLET | Refills: 0 | OUTPATIENT
Start: 2022-10-28 | End: 2023-10-23

## 2022-10-28 NOTE — TELEPHONE ENCOUNTER
Please review. Protocol failed / No protocol. Requested Prescriptions   Pending Prescriptions Disp Refills    escitalopram (LEXAPRO) 20 MG Oral Tab 90 tablet 0     Sig: Take 1 tablet (20 mg total) by mouth in the morning. Psychiatric Non-Scheduled (Anti-Anxiety) Passed - 10/26/2022  8:56 PM        Passed - In person appointment or virtual visit in the past 6 mos or appointment in next 3 mos     Recent Outpatient Visits              1 week ago Other depression    [facility] [address], Yasmin Herrera MD    Office Visit    1 month ago Primary osteoarthritis of right knee    [facility], [address], [address] [facility], Ankit Maser, [employer]    Office Visit    2 months ago Bilateral sensorineural hearing loss    [facility] Audiology Snehal Heller, [address], [address] [facility] [address]    Office Visit    2 months ago Sensorineural hearing loss (SNHL) of both [facility] [address] [place] [address], [address], Dlfj-Zsbaix-VltkzjsEzequiel Cisneros MD    Office Visit    3 months ago Patient overweight    Carmenza Zamudio MD    Office Visit          Future Appointments         Provider Department Appt Notes    In 3 weeks Marina Still MD [address] [facility], [place] [employer]                 zolpidem 10 MG Oral Tab 90 tablet 0     Sig: Take 1 tablet (10 mg total) by mouth nightly. There is no refill protocol information for this order       metoprolol succinate ER (TOPROL XL) 50 MG Oral Tablet 24 Hr 90 tablet 0     Sig: Take 1 tablet (50 mg total) by mouth in the morning.        Hypertensive Medications Protocol Failed - 10/26/2022  8:56 PM        Failed - Last BP reading less than 140/90     BP Readings from Last 1 Encounters:  10/19/22 : (!) 135/95              Passed - In person appointment in the past 12 or next 3 months     Recent Outpatient Visits              1 week ago Other depression    [facility], Rene Rojas MD    Office Visit    1 month ago Primary osteoarthritis of right knee    [facility], [address], [place] Renetta Costa, [employer]    Office Visit    2 months ago Bilateral sensorineural hearing loss    [facility] Audiology Olga Aguirre, [address], [address]    Office Visit    2 months ago Sensorineural hearing loss (SNHL) of both ears    [facility], [address], Mansoor Hall MD    Office Visit    3 months ago Patient overweight    Noni Arguelles MD    Office Visit          Future Appointments         Provider Department Appt Notes    In 3 weeks Douglas Payne MD [address], [address], [facility]               Passed - CMP or BMP in past 6 months     Recent Results (from the past 4392 hour(s))   COMP METABOLIC PANEL (14)    Collection Time: 07/05/22 11:35 AM   Result Value Ref Range    Glucose 97 70 - 99 mg/dL    Sodium 142 136 - 145 mmol/L    Potassium 4.8 3.5 - 5.1 mmol/L    Chloride 107 98 - 112 mmol/L    CO2 28.0 21.0 - 32.0 mmol/L    Anion Gap 7 0 - 18 mmol/L    BUN 13 7 - 18 mg/dL    Creatinine 0.99 0.55 - 1.02 mg/dL    BUN/CREA Ratio 13.1 10.0 - 20.0    Calcium, Total 8.8 8.5 - 10.1 mg/dL    Calculated Osmolality 294 275 - 295 mOsm/kg    GFR, Non- 65 >=60    GFR, -American 75 >=60    ALT 13 13 - 56 U/L    AST 9 (L) 15 - 37 U/L    Alkaline Phosphatase 64 41 - 108 U/L    Bilirubin, Total 1.0 0.1 - 2.0 mg/dL    Total Protein 7.2 6.4 - 8.2 g/dL    Albumin 3.4 3.4 - 5.0 g/dL    Globulin  3.8 2.8 - 4.4 g/dL    A/G Ratio 0.9 (L) 1.0 - 2.0    Patient Fasting for CMP? Yes      *Note: Due to a large number of results and/or encounters for the requested time period, some results have not been displayed. A complete set of results can be found in Results Review.                Passed - In person appointment or virtual visit in the past 6 months     Recent Outpatient Visits              1 week ago Other depression    [address] [place], [address] Kirill Arnold MD    Office Visit    1 month ago Primary osteoarthritis of right knee    [facility], [address], [facility], [place] [employer]    Office Visit    2 months ago Bilateral sensorineural hearing loss    [facility] Audiology [address], [address] [employer]    Office Visit    2 months ago Sensorineural hearing loss (SNHL) of both [facility] [address] [place] [address], [address], Daryl Hall MD    Office Visit    3 months ago Patient overweight    Sacha Vance MD    Office Visit          Future Appointments         Provider Department Appt Notes    In 3 weeks Lisa Nascimento MD [address], [address], [place] [employer]               Passed - EGFRCR or GFRNAA > 50     GFR Evaluation  GFRNAA: 65 , resulted on 7/5/2022            terbinafine (LAMISIL) 250 MG Oral Tab 7 tablet 0     Sig: Take 1 tablet (250 mg total) by mouth in the morning.        There is no refill protocol information for this order       triamcinolone 0.1 % External Ointment 45 g 0     Sig: Apply bid prn       There is no refill protocol information for this order         Recent Outpatient Visits              1 week ago Other depression    Sacha Vance MD    Office Visit    1 month ago Primary osteoarthritis of right knee    [facility], [address], [place], [place] [employer]    Office Visit    2 months ago Bilateral sensorineural hearing loss    [facility] Audiology Olga Scott, [address], [address] [employer] [employer]    Office Visit    2 months ago Sensorineural hearing loss (SNHL) of both [facility] [address] [place], Daryl Hall MD    Office Visit    3 months ago Patient overweight    Carmenza Zamudio MD    Office Visit          Future Appointments         Provider Department Appt Notes    In 3 weeks Marina Still MD [address], [address] [facility] [address], [facility]

## 2022-10-28 NOTE — TELEPHONE ENCOUNTER
DR Maureen Salcedo: can you re-send zolpidem. The prescription didn't get sent directly to pharmacy. Pended rx.

## 2022-10-31 RX ORDER — ZOLPIDEM TARTRATE 10 MG/1
10 TABLET ORAL NIGHTLY
Qty: 90 TABLET | Refills: 0 | Status: SHIPPED | OUTPATIENT
Start: 2022-10-31 | End: 2023-10-26

## 2022-11-18 ENCOUNTER — LAB ENCOUNTER (OUTPATIENT)
Dept: LAB | Facility: HOSPITAL | Age: 52
End: 2022-11-18
Attending: INTERNAL MEDICINE
Payer: MEDICAID

## 2022-11-18 ENCOUNTER — OFFICE VISIT (OUTPATIENT)
Dept: SURGERY | Facility: CLINIC | Age: 52
End: 2022-11-18
Payer: MEDICAID

## 2022-11-18 VITALS
DIASTOLIC BLOOD PRESSURE: 101 MMHG | SYSTOLIC BLOOD PRESSURE: 152 MMHG | WEIGHT: 269 LBS | BODY MASS INDEX: 44.82 KG/M2 | HEIGHT: 65.1 IN | OXYGEN SATURATION: 99 % | HEART RATE: 72 BPM

## 2022-11-18 DIAGNOSIS — E88.81 INSULIN RESISTANCE: ICD-10-CM

## 2022-11-18 DIAGNOSIS — R73.09 ABNORMAL BLOOD SUGAR: ICD-10-CM

## 2022-11-18 DIAGNOSIS — F43.9 STRESS: ICD-10-CM

## 2022-11-18 DIAGNOSIS — R63.5 WEIGHT GAIN: ICD-10-CM

## 2022-11-18 DIAGNOSIS — E66.01 MORBID OBESITY WITH BMI OF 40.0-44.9, ADULT (HCC): ICD-10-CM

## 2022-11-18 DIAGNOSIS — E55.9 VITAMIN D DEFICIENCY: ICD-10-CM

## 2022-11-18 DIAGNOSIS — I10 HTN (HYPERTENSION), BENIGN: Primary | ICD-10-CM

## 2022-11-18 DIAGNOSIS — M17.0 PRIMARY OSTEOARTHRITIS OF BOTH KNEES: ICD-10-CM

## 2022-11-18 DIAGNOSIS — I10 HTN (HYPERTENSION), BENIGN: ICD-10-CM

## 2022-11-18 LAB
ATRIAL RATE: 56 BPM
FOLATE SERPL-MCNC: 12.3 NG/ML (ref 8.7–?)
INSULIN SERPL-ACNC: 29.4 MU/L (ref 3–25)
P AXIS: 34 DEGREES
P-R INTERVAL: 158 MS
Q-T INTERVAL: 456 MS
QRS DURATION: 150 MS
QTC CALCULATION (BEZET): 440 MS
R AXIS: -10 DEGREES
T AXIS: -4 DEGREES
VENTRICULAR RATE: 56 BPM
VIT B12 SERPL-MCNC: 297 PG/ML (ref 193–986)
VIT D+METAB SERPL-MCNC: 30.7 NG/ML (ref 30–100)

## 2022-11-18 PROCEDURE — 99204 OFFICE O/P NEW MOD 45 MIN: CPT | Performed by: INTERNAL MEDICINE

## 2022-11-18 PROCEDURE — 82607 VITAMIN B-12: CPT

## 2022-11-18 PROCEDURE — 36415 COLL VENOUS BLD VENIPUNCTURE: CPT

## 2022-11-18 PROCEDURE — 82397 CHEMILUMINESCENT ASSAY: CPT

## 2022-11-18 PROCEDURE — 82746 ASSAY OF FOLIC ACID SERUM: CPT

## 2022-11-18 PROCEDURE — 93005 ELECTROCARDIOGRAM TRACING: CPT

## 2022-11-18 PROCEDURE — 83525 ASSAY OF INSULIN: CPT

## 2022-11-18 PROCEDURE — 82306 VITAMIN D 25 HYDROXY: CPT

## 2022-11-18 PROCEDURE — 93010 ELECTROCARDIOGRAM REPORT: CPT | Performed by: STUDENT IN AN ORGANIZED HEALTH CARE EDUCATION/TRAINING PROGRAM

## 2022-11-18 RX ORDER — TOPIRAMATE 25 MG/1
25 TABLET ORAL 2 TIMES DAILY
Qty: 120 TABLET | Refills: 1 | Status: SHIPPED | OUTPATIENT
Start: 2022-11-18 | End: 2023-01-17

## 2022-11-21 LAB — LEPTIN: 48.2 NG/ML

## 2022-11-23 ENCOUNTER — OFFICE VISIT (OUTPATIENT)
Dept: ORTHOPEDICS CLINIC | Facility: CLINIC | Age: 52
End: 2022-11-23
Payer: MEDICAID

## 2022-11-23 VITALS
HEART RATE: 57 BPM | DIASTOLIC BLOOD PRESSURE: 84 MMHG | BODY MASS INDEX: 44.82 KG/M2 | WEIGHT: 269 LBS | SYSTOLIC BLOOD PRESSURE: 129 MMHG | HEIGHT: 65 IN

## 2022-11-23 DIAGNOSIS — M17.11 PRIMARY OSTEOARTHRITIS OF RIGHT KNEE: Primary | ICD-10-CM

## 2022-11-23 PROBLEM — Z47.89 ORTHOPEDIC AFTERCARE: Status: ACTIVE | Noted: 2022-11-23

## 2022-11-23 PROCEDURE — 3079F DIAST BP 80-89 MM HG: CPT | Performed by: PHYSICIAN ASSISTANT

## 2022-11-23 PROCEDURE — 3008F BODY MASS INDEX DOCD: CPT | Performed by: PHYSICIAN ASSISTANT

## 2022-11-23 PROCEDURE — 20610 DRAIN/INJ JOINT/BURSA W/O US: CPT | Performed by: PHYSICIAN ASSISTANT

## 2022-11-23 PROCEDURE — 3074F SYST BP LT 130 MM HG: CPT | Performed by: PHYSICIAN ASSISTANT

## 2022-11-23 RX ORDER — TRIAMCINOLONE ACETONIDE 40 MG/ML
40 INJECTION, SUSPENSION INTRA-ARTICULAR; INTRAMUSCULAR ONCE
Status: COMPLETED | OUTPATIENT
Start: 2022-11-23 | End: 2022-11-23

## 2022-11-23 RX ADMIN — TRIAMCINOLONE ACETONIDE 40 MG: 40 INJECTION, SUSPENSION INTRA-ARTICULAR; INTRAMUSCULAR at 17:22:00

## 2022-11-23 NOTE — PROGRESS NOTES
Per verbal order from Dr. Neva Scott, draw up and 4ml of 0.5% Marcaine and 1ml of Kenalog 40 for injection into right knee Adela Sep  Patient provided education handout for cortisone injection.

## 2022-11-28 DIAGNOSIS — E66.01 MORBID OBESITY WITH BMI OF 40.0-44.9, ADULT (HCC): Primary | ICD-10-CM

## 2022-11-28 RX ORDER — PHENTERMINE HYDROCHLORIDE 15 MG/1
15 CAPSULE ORAL EVERY MORNING
Qty: 30 CAPSULE | Refills: 2 | Status: SHIPPED | OUTPATIENT
Start: 2022-11-28

## 2023-01-30 ENCOUNTER — PATIENT MESSAGE (OUTPATIENT)
Dept: ORTHOPEDICS CLINIC | Facility: CLINIC | Age: 53
End: 2023-01-30

## 2023-01-30 NOTE — TELEPHONE ENCOUNTER
From: Clary Christianson  To: Perez Nicole PA-C  Sent: 1/30/2023 7:44 AM CST  Subject: Cortisone Shot    Hello,  I need an appointment for a cortisone shot as soon as possible, it is unbearable for me to walk.     Thank you,  Camilo Alcaraz

## 2023-01-31 DIAGNOSIS — E66.01 MORBID OBESITY WITH BMI OF 40.0-44.9, ADULT (HCC): ICD-10-CM

## 2023-02-01 ENCOUNTER — TELEPHONE (OUTPATIENT)
Dept: ORTHOPEDICS CLINIC | Facility: CLINIC | Age: 53
End: 2023-02-01

## 2023-02-01 RX ORDER — PHENTERMINE HYDROCHLORIDE 15 MG/1
15 CAPSULE ORAL EVERY MORNING
Qty: 30 CAPSULE | Refills: 2 | Status: SHIPPED | OUTPATIENT
Start: 2023-02-01

## 2023-02-01 NOTE — TELEPHONE ENCOUNTER
Phone call transferred from phone room alexander. Right knee cortisone injection 11/23/22     10/10 for aprox 5 days right knee. Patient states it might be due to cold. Patient requesting cortisone injection sooner. I added patient to 1:50pm slot 2/3/23 fyi  Please notify ortho RN if you have concerns about this appointment date or 10/10 pain thanks!

## 2023-02-01 NOTE — TELEPHONE ENCOUNTER
Attempted to call patient no voicemail set up. Phone room if patient calls back today 2/1/23 before 1pm please attempt to transfer to [id number] thanks.

## 2023-02-01 NOTE — TELEPHONE ENCOUNTER
Patient states she is returning a call from Florinda Miguel to get her in earlier with doctor.  NO TE. Please advise

## 2023-02-02 RX ORDER — TIZANIDINE 4 MG/1
2 TABLET ORAL EVERY 8 HOURS PRN
Qty: 40 TABLET | Refills: 0 | Status: SHIPPED | OUTPATIENT
Start: 2023-02-02

## 2023-02-02 RX ORDER — ZOLPIDEM TARTRATE 10 MG/1
10 TABLET ORAL NIGHTLY
Qty: 90 TABLET | Refills: 0 | Status: SHIPPED | OUTPATIENT
Start: 2023-02-02 | End: 2024-01-28

## 2023-02-02 RX ORDER — ESCITALOPRAM OXALATE 20 MG/1
20 TABLET ORAL DAILY
Qty: 90 TABLET | Refills: 0 | Status: SHIPPED | OUTPATIENT
Start: 2023-02-02 | End: 2024-01-28

## 2023-02-02 RX ORDER — METOPROLOL SUCCINATE 50 MG/1
50 TABLET, EXTENDED RELEASE ORAL DAILY
Qty: 90 TABLET | Refills: 0 | Status: SHIPPED | OUTPATIENT
Start: 2023-02-02 | End: 2024-01-28

## 2023-02-03 ENCOUNTER — OFFICE VISIT (OUTPATIENT)
Dept: ORTHOPEDICS CLINIC | Facility: CLINIC | Age: 53
End: 2023-02-03

## 2023-02-03 DIAGNOSIS — M17.11 PRIMARY OSTEOARTHRITIS OF RIGHT KNEE: Primary | ICD-10-CM

## 2023-02-03 RX ORDER — TRIAMCINOLONE ACETONIDE 40 MG/ML
40 INJECTION, SUSPENSION INTRA-ARTICULAR; INTRAMUSCULAR ONCE
Status: COMPLETED | OUTPATIENT
Start: 2023-02-03 | End: 2023-02-03

## 2023-02-03 NOTE — PROCEDURES
Per verbal order from Akua Tolentino PA-C, draw up and 4ml of 0.5% Marcaine and 1ml of Kenalog 40 for injection into the right knee.

## 2023-02-08 RX ORDER — CELECOXIB 200 MG/1
200 CAPSULE ORAL DAILY
Qty: 30 CAPSULE | Refills: 0 | Status: SHIPPED | OUTPATIENT
Start: 2023-02-08

## 2023-02-08 NOTE — TELEPHONE ENCOUNTER
Rx request for Celecoxib 200mg, please review and sign off if appropriate. Thank you.     Last seen: 2/3/23  Last refill: 10/27/22

## 2023-02-13 ENCOUNTER — PATIENT MESSAGE (OUTPATIENT)
Dept: ORTHOPEDICS CLINIC | Facility: CLINIC | Age: 53
End: 2023-02-13

## 2023-02-13 NOTE — TELEPHONE ENCOUNTER
Attempted to call patient, no voicemail set up. Send follow up [employer] message to get further information regarding symptoms.

## 2023-02-13 NOTE — TELEPHONE ENCOUNTER
It is a bruise from the injection. he should ice and elevate and take anti-inflammatory medications it should improve over the next few days to a week.

## 2023-02-24 NOTE — TELEPHONE ENCOUNTER
Detail Level: Zone Please review refill protocol failed/ no protocol  Requested Prescriptions   Pending Prescriptions Disp Refills    zolpidem 10 MG Oral Tab 30 tablet 1     Sig: Take 1 tablet (10 mg total) by mouth nightly.         There is no refill protocol information for this order

## 2023-03-13 RX ORDER — TOPIRAMATE 25 MG/1
TABLET ORAL
Qty: 120 TABLET | Refills: 0 | Status: SHIPPED | OUTPATIENT
Start: 2023-03-13

## 2023-03-31 ENCOUNTER — OFFICE VISIT (OUTPATIENT)
Dept: ORTHOPEDICS CLINIC | Facility: CLINIC | Age: 53
End: 2023-03-31

## 2023-03-31 ENCOUNTER — HOSPITAL ENCOUNTER (OUTPATIENT)
Dept: GENERAL RADIOLOGY | Facility: HOSPITAL | Age: 53
Discharge: HOME | End: 2023-03-31
Attending: ORTHOPAEDIC SURGERY
Payer: MEDICAID

## 2023-03-31 VITALS — WEIGHT: 276.81 LBS | HEIGHT: 65 IN | BODY MASS INDEX: 46.12 KG/M2

## 2023-03-31 DIAGNOSIS — M17.11 PRIMARY OSTEOARTHRITIS OF RIGHT KNEE: ICD-10-CM

## 2023-03-31 DIAGNOSIS — M17.11 PRIMARY OSTEOARTHRITIS OF RIGHT KNEE: Primary | ICD-10-CM

## 2023-03-31 PROCEDURE — 3008F BODY MASS INDEX DOCD: CPT | Performed by: ORTHOPAEDIC SURGERY

## 2023-03-31 PROCEDURE — 99213 OFFICE O/P EST LOW 20 MIN: CPT | Performed by: ORTHOPAEDIC SURGERY

## 2023-03-31 PROCEDURE — 73562 X-RAY EXAM OF KNEE 3: CPT | Performed by: ORTHOPAEDIC SURGERY

## 2023-04-13 ENCOUNTER — LAB ENCOUNTER (OUTPATIENT)
Dept: LAB | Age: 53
End: 2023-04-13
Attending: FAMILY MEDICINE
Payer: MEDICAID

## 2023-04-13 ENCOUNTER — OFFICE VISIT (OUTPATIENT)
Dept: FAMILY MEDICINE CLINIC | Facility: CLINIC | Age: 53
End: 2023-04-13

## 2023-04-13 VITALS
DIASTOLIC BLOOD PRESSURE: 66 MMHG | RESPIRATION RATE: 16 BRPM | BODY MASS INDEX: 45.15 KG/M2 | WEIGHT: 271 LBS | OXYGEN SATURATION: 97 % | SYSTOLIC BLOOD PRESSURE: 128 MMHG | HEART RATE: 69 BPM | HEIGHT: 65 IN

## 2023-04-13 DIAGNOSIS — R05.9 COUGH, UNSPECIFIED TYPE: Primary | ICD-10-CM

## 2023-04-13 DIAGNOSIS — R53.83 OTHER FATIGUE: ICD-10-CM

## 2023-04-13 DIAGNOSIS — E56.9 HYPOVITAMINOSIS: ICD-10-CM

## 2023-04-13 DIAGNOSIS — R41.3 MEMORY LOSS: ICD-10-CM

## 2023-04-13 DIAGNOSIS — K92.1 BLOOD IN THE STOOL: ICD-10-CM

## 2023-04-13 LAB
ALBUMIN SERPL-MCNC: 3.5 G/DL (ref 3.4–5)
ALBUMIN/GLOB SERPL: 0.9 {RATIO} (ref 1–2)
ALP LIVER SERPL-CCNC: 67 U/L
ALT SERPL-CCNC: 14 U/L
ANION GAP SERPL CALC-SCNC: 9 MMOL/L (ref 0–18)
AST SERPL-CCNC: 10 U/L (ref 15–37)
BASOPHILS # BLD AUTO: 0.06 X10(3) UL (ref 0–0.2)
BASOPHILS NFR BLD AUTO: 0.8 %
BILIRUB SERPL-MCNC: 1 MG/DL (ref 0.1–2)
BUN BLD-MCNC: 18 MG/DL (ref 7–18)
BUN/CREAT SERPL: 15.8 (ref 10–20)
CALCIUM BLD-MCNC: 9.4 MG/DL (ref 8.5–10.1)
CHLORIDE SERPL-SCNC: 108 MMOL/L (ref 98–112)
CO2 SERPL-SCNC: 25 MMOL/L (ref 21–32)
CREAT BLD-MCNC: 1.14 MG/DL
DEPRECATED RDW RBC AUTO: 41.4 FL (ref 35.1–46.3)
EOSINOPHIL # BLD AUTO: 0.28 X10(3) UL (ref 0–0.7)
EOSINOPHIL NFR BLD AUTO: 3.6 %
ERYTHROCYTE [DISTWIDTH] IN BLOOD BY AUTOMATED COUNT: 13.1 % (ref 11–15)
FASTING STATUS PATIENT QL REPORTED: YES
GFR SERPLBLD BASED ON 1.73 SQ M-ARVRAT: 58 ML/MIN/1.73M2 (ref 60–?)
GLOBULIN PLAS-MCNC: 4 G/DL (ref 2.8–4.4)
GLUCOSE BLD-MCNC: 96 MG/DL (ref 70–99)
HCT VFR BLD AUTO: 45.4 %
HGB BLD-MCNC: 14.5 G/DL
IMM GRANULOCYTES # BLD AUTO: 0.02 X10(3) UL (ref 0–1)
IMM GRANULOCYTES NFR BLD: 0.3 %
LYMPHOCYTES # BLD AUTO: 1.84 X10(3) UL (ref 1–4)
LYMPHOCYTES NFR BLD AUTO: 23.7 %
MCH RBC QN AUTO: 27.7 PG (ref 26–34)
MCHC RBC AUTO-ENTMCNC: 31.9 G/DL (ref 31–37)
MCV RBC AUTO: 86.6 FL
MONOCYTES # BLD AUTO: 0.54 X10(3) UL (ref 0.1–1)
MONOCYTES NFR BLD AUTO: 6.9 %
NEUTROPHILS # BLD AUTO: 5.03 X10 (3) UL (ref 1.5–7.7)
NEUTROPHILS # BLD AUTO: 5.03 X10(3) UL (ref 1.5–7.7)
NEUTROPHILS NFR BLD AUTO: 64.7 %
OSMOLALITY SERPL CALC.SUM OF ELEC: 296 MOSM/KG (ref 275–295)
PLATELET # BLD AUTO: 234 10(3)UL (ref 150–450)
POTASSIUM SERPL-SCNC: 4.2 MMOL/L (ref 3.5–5.1)
PROT SERPL-MCNC: 7.5 G/DL (ref 6.4–8.2)
RBC # BLD AUTO: 5.24 X10(6)UL
SODIUM SERPL-SCNC: 142 MMOL/L (ref 136–145)
TSI SER-ACNC: 1.41 MIU/ML (ref 0.36–3.74)
WBC # BLD AUTO: 7.8 X10(3) UL (ref 4–11)

## 2023-04-13 PROCEDURE — 85025 COMPLETE CBC W/AUTO DIFF WBC: CPT

## 2023-04-13 PROCEDURE — 80053 COMPREHEN METABOLIC PANEL: CPT

## 2023-04-13 PROCEDURE — 3074F SYST BP LT 130 MM HG: CPT | Performed by: FAMILY MEDICINE

## 2023-04-13 PROCEDURE — 3008F BODY MASS INDEX DOCD: CPT | Performed by: FAMILY MEDICINE

## 2023-04-13 PROCEDURE — 99215 OFFICE O/P EST HI 40 MIN: CPT | Performed by: FAMILY MEDICINE

## 2023-04-13 PROCEDURE — 3078F DIAST BP <80 MM HG: CPT | Performed by: FAMILY MEDICINE

## 2023-04-13 PROCEDURE — 96372 THER/PROPH/DIAG INJ SC/IM: CPT | Performed by: FAMILY MEDICINE

## 2023-04-13 PROCEDURE — 84443 ASSAY THYROID STIM HORMONE: CPT

## 2023-04-13 PROCEDURE — 36415 COLL VENOUS BLD VENIPUNCTURE: CPT

## 2023-04-13 RX ORDER — CYANOCOBALAMIN 1000 UG/ML
1000 INJECTION, SOLUTION INTRAMUSCULAR; SUBCUTANEOUS ONCE
Status: COMPLETED | OUTPATIENT
Start: 2023-04-13 | End: 2023-04-13

## 2023-04-13 RX ORDER — MONTELUKAST SODIUM 10 MG/1
10 TABLET ORAL DAILY
Qty: 30 TABLET | Refills: 1 | Status: SHIPPED | OUTPATIENT
Start: 2023-04-13 | End: 2024-04-07

## 2023-04-13 RX ORDER — BUPROPION HYDROCHLORIDE 150 MG/1
TABLET ORAL
Qty: 180 TABLET | Refills: 1 | Status: SHIPPED | OUTPATIENT
Start: 2023-04-13

## 2023-04-13 RX ADMIN — CYANOCOBALAMIN 1000 MCG: 1000 INJECTION, SOLUTION INTRAMUSCULAR; SUBCUTANEOUS at 11:51:00

## 2023-04-14 ENCOUNTER — TELEPHONE (OUTPATIENT)
Dept: FAMILY MEDICINE CLINIC | Facility: CLINIC | Age: 53
End: 2023-04-14

## 2023-04-14 RX ORDER — BUPROPION HYDROCHLORIDE 300 MG/1
300 TABLET ORAL DAILY
Qty: 90 TABLET | Refills: 3 | Status: CANCELLED | OUTPATIENT
Start: 2023-04-14

## 2023-04-14 NOTE — TELEPHONE ENCOUNTER
Dr. Maureen Salcedo, please advise if you agree with the suggestion below. Pharmacy is recommending 300 mg daily dose versus 150 mg BID. Pended for review.

## 2023-04-17 NOTE — TELEPHONE ENCOUNTER
[facility] is calling and checking the status on this.  I informed her they are still waiting for Drs approval

## 2023-04-18 RX ORDER — BUPROPION HYDROCHLORIDE 150 MG/1
150 TABLET ORAL DAILY
Qty: 90 TABLET | Refills: 0 | Status: SHIPPED | OUTPATIENT
Start: 2023-04-18

## 2023-04-18 NOTE — TELEPHONE ENCOUNTER
Spoke with Enum at [address] [place] [address] who verified that Buproprion  mg once daily was approved by insurance. Patient informed.

## 2023-04-24 ENCOUNTER — HOSPITAL ENCOUNTER (OUTPATIENT)
Dept: GENERAL RADIOLOGY | Facility: HOSPITAL | Age: 53
Discharge: HOME | End: 2023-04-24
Attending: FAMILY MEDICINE
Payer: MEDICAID

## 2023-04-24 ENCOUNTER — OFFICE VISIT (OUTPATIENT)
Dept: SURGERY | Facility: CLINIC | Age: 53
End: 2023-04-24
Payer: MEDICAID

## 2023-04-24 VITALS
DIASTOLIC BLOOD PRESSURE: 80 MMHG | BODY MASS INDEX: 45.98 KG/M2 | HEART RATE: 69 BPM | SYSTOLIC BLOOD PRESSURE: 130 MMHG | OXYGEN SATURATION: 99 % | HEIGHT: 65.1 IN | WEIGHT: 276 LBS

## 2023-04-24 DIAGNOSIS — M17.0 PRIMARY OSTEOARTHRITIS OF BOTH KNEES: ICD-10-CM

## 2023-04-24 DIAGNOSIS — E88.81 INSULIN RESISTANCE: ICD-10-CM

## 2023-04-24 DIAGNOSIS — R05.9 COUGH, UNSPECIFIED TYPE: ICD-10-CM

## 2023-04-24 DIAGNOSIS — I10 HTN (HYPERTENSION), BENIGN: Primary | ICD-10-CM

## 2023-04-24 DIAGNOSIS — F43.9 STRESS: ICD-10-CM

## 2023-04-24 DIAGNOSIS — E66.01 MORBID OBESITY WITH BMI OF 40.0-44.9, ADULT (HCC): ICD-10-CM

## 2023-04-24 PROBLEM — E88.819 INSULIN RESISTANCE: Status: ACTIVE | Noted: 2023-04-24

## 2023-04-24 PROCEDURE — 3079F DIAST BP 80-89 MM HG: CPT | Performed by: INTERNAL MEDICINE

## 2023-04-24 PROCEDURE — 3008F BODY MASS INDEX DOCD: CPT | Performed by: INTERNAL MEDICINE

## 2023-04-24 PROCEDURE — 99214 OFFICE O/P EST MOD 30 MIN: CPT | Performed by: INTERNAL MEDICINE

## 2023-04-24 PROCEDURE — 3075F SYST BP GE 130 - 139MM HG: CPT | Performed by: INTERNAL MEDICINE

## 2023-04-24 PROCEDURE — 71046 X-RAY EXAM CHEST 2 VIEWS: CPT | Performed by: FAMILY MEDICINE

## 2023-04-24 RX ORDER — PHENTERMINE HYDROCHLORIDE 15 MG/1
15 CAPSULE ORAL EVERY MORNING
Qty: 30 CAPSULE | Refills: 2 | Status: SHIPPED | OUTPATIENT
Start: 2023-04-24

## 2023-04-27 RX ORDER — TOPIRAMATE 25 MG/1
TABLET ORAL
Qty: 120 TABLET | Refills: 0 | Status: SHIPPED | OUTPATIENT
Start: 2023-04-27

## 2023-05-16 RX ORDER — METOPROLOL SUCCINATE 50 MG/1
50 TABLET, EXTENDED RELEASE ORAL DAILY
Qty: 90 TABLET | Refills: 3 | Status: SHIPPED | OUTPATIENT
Start: 2023-05-16 | End: 2024-05-10

## 2023-05-16 NOTE — TELEPHONE ENCOUNTER
Please review. Protocol failed/ No protocol      Requested Prescriptions   Pending Prescriptions Disp Refills    tiZANidine 4 MG Oral Tab 40 tablet 0     Sig: Take 0.5 tablets (2 mg total) by mouth every 8 (eight) hours as needed. There is no refill protocol information for this order       triamcinolone 0.1 % External Ointment 45 g 0     Sig: Apply bid prn       There is no refill protocol information for this order       zolpidem 10 MG Oral Tab 90 tablet 0     Sig: Take 1 tablet (10 mg total) by mouth nightly. There is no refill protocol information for this order      Signed Prescriptions Disp Refills    metoprolol succinate ER (TOPROL XL) 50 MG Oral Tablet 24 Hr 90 tablet 3     Sig: Take 1 tablet (50 mg total) by mouth daily.        Hypertensive Medications Protocol Passed - 5/16/2023  8:22 AM        Passed - In person appointment in the past 12 or next 3 months     Recent Outpatient Visits              3 weeks ago HTN (hypertension), benign    [address] [facility], Isabella Jordan MD    Office Visit    1 month ago Cough, unspecified type    [address] [place] [address], Jose Lam MD    Office Visit    1 month ago Primary osteoarthritis of right knee    [address] [facility], Emily Rocha MD    Office Visit    3 months ago Primary osteoarthritis of right knee    [address] [facility], Emily Rocha MD    Office Visit    5 months ago Primary osteoarthritis of right knee    [address] [facility], Emily Rocha MD    Office Visit                      Passed - Last BP reading less than 140/90     BP Readings from Last 1 Encounters:  04/24/23 : 130/80                Passed - CMP or BMP in past 6 months     Recent Results (from the past 4392 hour(s))   COMP METABOLIC PANEL (14)    Collection Time: 04/13/23 11:56 AM   Result Value Ref Range    Glucose 96 70 - 99 mg/dL    Sodium 142 136 - 145 mmol/L    Potassium 4.2 3.5 - 5.1 mmol/L    Chloride 108 98 - 112 mmol/L    CO2 25.0 21.0 - 32.0 mmol/L    Anion Gap 9 0 - 18 mmol/L    BUN 18 7 - 18 mg/dL    Creatinine 1.14 (H) 0.55 - 1.02 mg/dL    BUN/CREA Ratio 15.8 10.0 - 20.0    Calcium, Total 9.4 8.5 - 10.1 mg/dL    Calculated Osmolality 296 (H) 275 - 295 mOsm/kg    eGFR-Cr 58 (L) >=60 mL/min/1.73m2    ALT 14 13 - 56 U/L    AST 10 (L) 15 - 37 U/L    Alkaline Phosphatase 67 41 - 108 U/L    Bilirubin, Total 1.0 0.1 - 2.0 mg/dL    Total Protein 7.5 6.4 - 8.2 g/dL    Albumin 3.5 3.4 - 5.0 g/dL    Globulin  4.0 2.8 - 4.4 g/dL    A/G Ratio 0.9 (L) 1.0 - 2.0    Patient Fasting for CMP? Yes      *Note: Due to a large number of results and/or encounters for the requested time period, some results have not been displayed. A complete set of results can be found in Results Review.                  Passed - In person appointment or virtual visit in the past 6 months     Recent Outpatient Visits              3 weeks ago HTN (hypertension), benign    [address] [facility], Dora Conroy MD    Office Visit    1 month ago Cough, unspecified type    [place], Mela Contreras MD    Office Visit    1 month ago Primary osteoarthritis of right knee    [address] [facility], Francisco J Sequeira MD    Office Visit    3 months ago Primary osteoarthritis of right knee    [address] [facility], Francisco J Sequeira MD    Office Visit    5 months ago Primary osteoarthritis of right knee    [address] [facility], Amanda Alicea MD    Office Visit                      Passed - EGFRCR or GFRNAA > 50     GFR Evaluation  EGFRCR: 62 , resulted on 4/13/2023                      Recent Outpatient Visits              3 weeks ago HTN (hypertension), benign Natalie Lewis MD    Office Visit    1 month ago Cough, unspecified type    Lion Murphy MD    Office Visit    1 month ago Primary osteoarthritis of right knee    [address] [facility] Corina Celis MD    Office Visit    3 months ago Primary osteoarthritis of right knee    [address] [facility], [facility] Corina Celis MD    Office Visit    5 months ago Primary osteoarthritis of right knee    [facility]anne [place], [address] [facility], Sebastián De Leon MD    Office Visit

## 2023-05-16 NOTE — TELEPHONE ENCOUNTER
Refill passed per [place] [employer] protocol. Requested Prescriptions   Pending Prescriptions Disp Refills    tiZANidine 4 MG Oral Tab 40 tablet 0     Sig: Take 0.5 tablets (2 mg total) by mouth every 8 (eight) hours as needed. There is no refill protocol information for this order       metoprolol succinate ER (TOPROL XL) 50 MG Oral Tablet 24 Hr 90 tablet 0     Sig: Take 1 tablet (50 mg total) by mouth daily.        Hypertensive Medications Protocol Passed - 5/16/2023  8:22 AM        Passed - In person appointment in the past 12 or next 3 months     Recent Outpatient Visits              3 weeks ago HTN (hypertension), benign    Leesa Trammell MD    Office Visit    1 month ago Cough, unspecified type    [address] [facility], Bhavani Leon MD    Office Visit    1 month ago Primary osteoarthritis of right knee    Manolo Trammell Deyanne Moan, MD    Office Visit    3 months ago Primary osteoarthritis of right knee    Manolo Trammell Deyanne Moan, MD    Office Visit    5 months ago Primary osteoarthritis of right knee    [address] [facility],[address], [address] [facility],[address], Jaycee Alicea MD    Office Visit                      Passed - Last BP reading less than 140/90     BP Readings from Last 1 Encounters:  04/24/23 : 130/80                Passed - CMP or BMP in past 6 months     Recent Results (from the past 4392 hour(s))   COMP METABOLIC PANEL (14)    Collection Time: 04/13/23 11:56 AM   Result Value Ref Range    Glucose 96 70 - 99 mg/dL    Sodium 142 136 - 145 mmol/L    Potassium 4.2 3.5 - 5.1 mmol/L    Chloride 108 98 - 112 mmol/L    CO2 25.0 21.0 - 32.0 mmol/L    Anion Gap 9 0 - 18 mmol/L    BUN 18 7 - 18 mg/dL    Creatinine 1.14 (H) 0.55 - 1.02 mg/dL    BUN/CREA Ratio 15.8 10.0 - 20.0    Calcium, Total 9.4 8.5 - 10.1 mg/dL    Calculated Osmolality 296 (H) 275 - 295 mOsm/kg    eGFR-Cr 58 (L) >=60 mL/min/1.73m2    ALT 14 13 - 56 U/L    AST 10 (L) 15 - 37 U/L    Alkaline Phosphatase 67 41 - 108 U/L    Bilirubin, Total 1.0 0.1 - 2.0 mg/dL    Total Protein 7.5 6.4 - 8.2 g/dL    Albumin 3.5 3.4 - 5.0 g/dL    Globulin  4.0 2.8 - 4.4 g/dL    A/G Ratio 0.9 (L) 1.0 - 2.0    Patient Fasting for CMP? Yes      *Note: Due to a large number of results and/or encounters for the requested time period, some results have not been displayed. A complete set of results can be found in Results Review. Passed - In person appointment or virtual visit in the past 6 months     Recent Outpatient Visits              3 weeks ago HTN (hypertension), benign    [address] [place], [place] [address]Isai MD    Office Visit    1 month ago Cough, unspecified type    Bubba Barragan MD    Office Visit    1 month ago Primary osteoarthritis of right knee    [address] [place], [place] [address], Emilia Renteria MD    Office Visit    3 months ago Primary osteoarthritis of right knee    [address] [place], [place] [address], Emilia Renteria MD    Office Visit    5 months ago Primary osteoarthritis of right knee    [address] [place] [address] [employer], Emilia Renteria MD    Office Visit                      Passed - EGFRCR or GFRNAA > 50     GFR Evaluation  EGFRCR: 58 , resulted on 4/13/2023              triamcinolone 0.1 % External Ointment 45 g 0     Sig: Apply bid prn       There is no refill protocol information for this order       zolpidem 10 MG Oral Tab 90 tablet 0     Sig: Take 1 tablet (10 mg total) by mouth nightly.        There is no refill protocol information for this order               Recent Outpatient Visits              3 weeks ago HTN (hypertension), benign    [facility], Kasi Coker MD    Office Visit    1 month ago Cough, unspecified type    Maude Alvarado MD    Office Visit    1 month ago Primary osteoarthritis of right knee    [facility], Sania Llamas MD    Office Visit    3 months ago Primary osteoarthritis of right knee    [facility], Sania Llamas MD    Office Visit    5 months ago Primary osteoarthritis of right knee    [address], [address] [facility],[address] Floor, Segundo Mccormick MD    Office Visit

## 2023-05-17 RX ORDER — CELECOXIB 200 MG/1
200 CAPSULE ORAL DAILY
Qty: 30 CAPSULE | Refills: 0 | Status: SHIPPED | OUTPATIENT
Start: 2023-05-17

## 2023-05-18 NOTE — TELEPHONE ENCOUNTER
Please review refill protocol failed/ no protocol  Requested Prescriptions   Pending Prescriptions Disp Refills    zolpidem 10 MG Oral Tab 90 tablet 0     Sig: Take 1 tablet (10 mg total) by mouth nightly.        There is no refill protocol information for this order

## 2023-05-19 ENCOUNTER — TELEPHONE (OUTPATIENT)
Dept: INTERNAL MEDICINE CLINIC | Facility: CLINIC | Age: 53
End: 2023-05-19

## 2023-05-19 RX ORDER — ZOLPIDEM TARTRATE 10 MG/1
10 TABLET ORAL NIGHTLY
Qty: 90 TABLET | Refills: 0 | Status: SHIPPED | OUTPATIENT
Start: 2023-05-19 | End: 2024-05-13

## 2023-05-19 RX ORDER — TIZANIDINE 4 MG/1
2 TABLET ORAL EVERY 8 HOURS PRN
Qty: 40 TABLET | Refills: 0 | Status: SHIPPED | OUTPATIENT
Start: 2023-05-19

## 2023-05-19 NOTE — TELEPHONE ENCOUNTER
Pharmacy calling to ask where patient applies triamcinolone 0.1 % External Ointment, and how many grams per day to be applied.

## 2023-05-19 NOTE — TELEPHONE ENCOUNTER
43 minutes ago (3:41 PM)     Abelardo Wick MD  Em Rn Triage 47 minutes ago (3:36 PM)       To area where she had rash and I put 39 G      Resent with updated instructions.

## 2023-06-04 ENCOUNTER — PATIENT MESSAGE (OUTPATIENT)
Dept: ORTHOPEDICS CLINIC | Facility: CLINIC | Age: 53
End: 2023-06-04

## 2023-06-05 NOTE — TELEPHONE ENCOUNTER
Spoke with patient reports Saturday right knee gave out as she was going down stairs and landed on right lateral side of right knee rates pain 10/10. Reports doesn't think its broken because she is able to walk on it. Reports is icing elevating. Advised her she can try OTC NSAIDs if she is not been told in the past by PCP not to take. Patient reports will try aleve in meantime. No openings sooner than 6/21/23 with Dr. Remus Primrose. Patient wondering if she can be seen sooner with Krys Brooks or Dr. Remus Primrose. Please advise if patient can be added on sooner to 50 slot or 6/14/23 to Krys Brooks schedule he has two openings currently.

## 2023-06-05 NOTE — TELEPHONE ENCOUNTER
From: Clary Christianson  To: Nettei Maldonado MD  Sent: 6/4/2023 2:14 PM CDT  Subject: My appointment and I fell down the stairs    Hello I have an appointment on June 21 but I have fallen and I think I have injured my knee worse, is there any appointment very soon?????   Thank you,  Camilo Alcaraz

## 2023-06-07 ENCOUNTER — OFFICE VISIT (OUTPATIENT)
Dept: ORTHOPEDICS CLINIC | Facility: CLINIC | Age: 53
End: 2023-06-07

## 2023-06-07 ENCOUNTER — HOSPITAL ENCOUNTER (OUTPATIENT)
Dept: GENERAL RADIOLOGY | Facility: HOSPITAL | Age: 53
Discharge: HOME | End: 2023-06-07
Attending: ORTHOPAEDIC SURGERY
Payer: COMMERCIAL

## 2023-06-07 VITALS
SYSTOLIC BLOOD PRESSURE: 136 MMHG | WEIGHT: 273.81 LBS | HEIGHT: 65 IN | DIASTOLIC BLOOD PRESSURE: 90 MMHG | BODY MASS INDEX: 45.62 KG/M2 | HEART RATE: 65 BPM

## 2023-06-07 DIAGNOSIS — M17.11 PRIMARY OSTEOARTHRITIS OF RIGHT KNEE: ICD-10-CM

## 2023-06-07 DIAGNOSIS — T14.90XA INJURY: Primary | ICD-10-CM

## 2023-06-07 DIAGNOSIS — T14.90XA INJURY: ICD-10-CM

## 2023-06-07 PROCEDURE — 3008F BODY MASS INDEX DOCD: CPT | Performed by: ORTHOPAEDIC SURGERY

## 2023-06-07 PROCEDURE — 3080F DIAST BP >= 90 MM HG: CPT | Performed by: ORTHOPAEDIC SURGERY

## 2023-06-07 PROCEDURE — 3075F SYST BP GE 130 - 139MM HG: CPT | Performed by: ORTHOPAEDIC SURGERY

## 2023-06-07 PROCEDURE — 20610 DRAIN/INJ JOINT/BURSA W/O US: CPT | Performed by: ORTHOPAEDIC SURGERY

## 2023-06-07 RX ORDER — TRIAMCINOLONE ACETONIDE 40 MG/ML
40 INJECTION, SUSPENSION INTRA-ARTICULAR; INTRAMUSCULAR ONCE
Status: COMPLETED | OUTPATIENT
Start: 2023-06-07 | End: 2023-06-07

## 2023-06-07 RX ADMIN — TRIAMCINOLONE ACETONIDE 40 MG: 40 INJECTION, SUSPENSION INTRA-ARTICULAR; INTRAMUSCULAR at 15:03:00

## 2023-06-07 NOTE — PROGRESS NOTES
Per verbal order from Dr. Parth Ortiz, draw up and 4ml of 0.5% Marcaine and 1ml of Kenalog 40 for injection into rightknee. Zahng Munson MA  Patient provided education handout for cortisone injection.

## 2023-06-19 DIAGNOSIS — E66.01 MORBID OBESITY WITH BMI OF 40.0-44.9, ADULT (HCC): ICD-10-CM

## 2023-06-20 RX ORDER — TOPIRAMATE 25 MG/1
25 TABLET ORAL 2 TIMES DAILY
Qty: 120 TABLET | Refills: 0 | Status: SHIPPED | OUTPATIENT
Start: 2023-06-20

## 2023-06-20 RX ORDER — PHENTERMINE HYDROCHLORIDE 15 MG/1
15 CAPSULE ORAL EVERY MORNING
Qty: 30 CAPSULE | Refills: 2 | Status: SHIPPED | OUTPATIENT
Start: 2023-06-20

## 2023-06-21 ENCOUNTER — PATIENT MESSAGE (OUTPATIENT)
Dept: FAMILY MEDICINE CLINIC | Facility: CLINIC | Age: 53
End: 2023-06-21

## 2023-06-21 NOTE — TELEPHONE ENCOUNTER
[employer] message sent to patient instructing to call nurse triage to speak directly to a nurse regarding symptoms as per department protocol.     Future Appointments   Date Time Provider Rosina Velarde   7/6/2023  9:30 AM Yoandy Mathew MD [facility]   9/26/2023  9:45 AM Kaimlla De La Rosa MD [address] [facility]

## 2023-06-22 RX ORDER — CELECOXIB 200 MG/1
200 CAPSULE ORAL DAILY
Qty: 30 CAPSULE | Refills: 0 | Status: SHIPPED | OUTPATIENT
Start: 2023-06-22

## 2023-06-22 NOTE — TELEPHONE ENCOUNTER
Rx request for Celecoxib 200mg, please review and sign off if appropriate. Thank you. Last seen: 6/7/23  Last refill: 5/17/23 #30 with 0 refills.

## 2023-07-06 ENCOUNTER — OFFICE VISIT (OUTPATIENT)
Dept: FAMILY MEDICINE CLINIC | Facility: CLINIC | Age: 53
End: 2023-07-06

## 2023-07-06 VITALS
BODY MASS INDEX: 44.48 KG/M2 | HEART RATE: 77 BPM | HEIGHT: 65 IN | SYSTOLIC BLOOD PRESSURE: 128 MMHG | WEIGHT: 267 LBS | OXYGEN SATURATION: 97 % | RESPIRATION RATE: 14 BRPM | DIASTOLIC BLOOD PRESSURE: 90 MMHG

## 2023-07-06 DIAGNOSIS — Z12.11 SCREENING FOR COLON CANCER: ICD-10-CM

## 2023-07-06 DIAGNOSIS — F32.89 OTHER DEPRESSION: ICD-10-CM

## 2023-07-06 DIAGNOSIS — Z12.31 ENCOUNTER FOR SCREENING MAMMOGRAM FOR BREAST CANCER: Primary | ICD-10-CM

## 2023-07-06 DIAGNOSIS — N76.1 SUBACUTE VAGINITIS: ICD-10-CM

## 2023-07-06 DIAGNOSIS — I10 ESSENTIAL HYPERTENSION: ICD-10-CM

## 2023-07-06 PROCEDURE — 3080F DIAST BP >= 90 MM HG: CPT | Performed by: FAMILY MEDICINE

## 2023-07-06 PROCEDURE — 99214 OFFICE O/P EST MOD 30 MIN: CPT | Performed by: FAMILY MEDICINE

## 2023-07-06 PROCEDURE — 3008F BODY MASS INDEX DOCD: CPT | Performed by: FAMILY MEDICINE

## 2023-07-06 PROCEDURE — 3074F SYST BP LT 130 MM HG: CPT | Performed by: FAMILY MEDICINE

## 2023-07-06 RX ORDER — CHLORTHALIDONE 25 MG/1
25 TABLET ORAL DAILY
Qty: 90 TABLET | Refills: 1 | Status: SHIPPED | OUTPATIENT
Start: 2023-07-06

## 2023-07-06 RX ORDER — FLUCONAZOLE 150 MG/1
150 TABLET ORAL EVERY OTHER DAY
Qty: 2 TABLET | Refills: 0 | Status: SHIPPED | OUTPATIENT
Start: 2023-07-06

## 2023-07-06 NOTE — PROGRESS NOTES
Subjective:   Patient ID: Julia Cunningham is a 46year old female. HPI  Patient had what appears folliculitis in the groin area which is now doing well   Also complains about vaginal itching   And bp has not been well   As far as depression doing much better   History/Other:   Review of Systems  Constitutional: Negative. Negative for activity change, appetite change, diaphoresis and fatigue. Respiratory: Negative. Negative for apnea, cough, chest tightness and shortness of breath. Cardiovascular: Negative. Negative for chest pain, palpitations and leg swelling. Gastrointestinal: Negative. Negative for abdominal pain. Skin: see hpi          Psychiatric/Behavioral: see hpi   Current Outpatient Medications   Medication Sig Dispense Refill    chlorthalidone 25 MG Oral Tab Take 1 tablet (25 mg total) by mouth daily. For high blood pressure or edema 90 tablet 1    fluconazole (DIFLUCAN) 150 MG Oral Tab Take 1 tablet (150 mg total) by mouth every other day. 2 tablet 0    celecoxib 200 MG Oral Cap Take 1 capsule (200 mg total) by mouth daily. 30 capsule 0    escitalopram 20 MG Oral Tab Take 1 tablet (20 mg total) by mouth daily. 90 tablet 3    tiZANidine 4 MG Oral Tab Take 0.5 tablets (2 mg total) by mouth every 8 (eight) hours as needed. 40 tablet 0    zolpidem 10 MG Oral Tab Take 1 tablet (10 mg total) by mouth nightly. 30 tablet 0    triamcinolone 0.1 % External Ointment Apply 1 g topically 2 (two) times daily. As needed to effected area (rash) 45 g 0    Phentermine HCl 15 MG Oral Cap Take 1 capsule (15 mg total) by mouth every morning. 30 capsule 2    topiramate 25 MG Oral Tab Take 1 tablet (25 mg total) by mouth 2 (two) times daily. 120 tablet 0    metoprolol succinate ER (TOPROL XL) 50 MG Oral Tablet 24 Hr Take 1 tablet (50 mg total) by mouth daily. 90 tablet 3    buPROPion  MG Oral Tablet 24 Hr Take 1 tablet (150 mg total) by mouth daily.  1 po every day 90 tablet 0    montelukast (SINGULAIR) 10 MG Oral Tab Take 1 tablet (10 mg total) by mouth daily. 30 tablet 1     Allergies:No Known Allergies    Objective:   Physical Exam  Constitutional:       Appearance: She is well-developed. Cardiovascular:      Rate and Rhythm: Normal rate and regular rhythm. Heart sounds: Normal heart sounds. Pulmonary:      Effort: Pulmonary effort is normal.      Breath sounds: Normal breath sounds. Skin:     Comments: Appears good now   Folliculitis has resolved    Neurological:      Mental Status: She is alert. Deep Tendon Reflexes: Reflexes are normal and symmetric. Assessment & Plan:   Encounter for screening mammogram for breast cancer  (primary encounter diagnosis)  Screening for colon cancer  (Z12.31) Encounter for screening mammogram for breast cancer  (primary encounter diagnosis)  Plan: [facility] 2D+3D SCREENING BILAT         (CPT=77067/87436)            (Z12.11) Screening for colon cancer  Plan: OCCULT BLOOD, FECAL, FIT IMMUNOASSAY            (F32.89) Other depression  Plan: cpm     (I10) Essential hypertension  Plan: add chlorthalidone     (N76.1) Subacute vaginitis  Plan: diflucan   F/u in 1 month       Orders Placed This Encounter      Occult Blood, Fecal, FIT Immunoassay      Meds This Visit:  Requested Prescriptions     Signed Prescriptions Disp Refills    chlorthalidone 25 MG Oral Tab 90 tablet 1     Sig: Take 1 tablet (25 mg total) by mouth daily. For high blood pressure or edema    fluconazole (DIFLUCAN) 150 MG Oral Tab 2 tablet 0     Sig: Take 1 tablet (150 mg total) by mouth every other day.        Imaging & Referrals:  [facility] 2D+3D SCREENING BILAT (CPT=77067/18702)

## 2023-08-16 ENCOUNTER — OFFICE VISIT (OUTPATIENT)
Dept: FAMILY MEDICINE CLINIC | Facility: CLINIC | Age: 53
End: 2023-08-16

## 2023-08-16 ENCOUNTER — LAB ENCOUNTER (OUTPATIENT)
Dept: LAB | Age: 53
End: 2023-08-16
Attending: FAMILY MEDICINE
Payer: MEDICAID

## 2023-08-16 VITALS
SYSTOLIC BLOOD PRESSURE: 138 MMHG | HEART RATE: 76 BPM | WEIGHT: 263.81 LBS | OXYGEN SATURATION: 97 % | BODY MASS INDEX: 43.95 KG/M2 | RESPIRATION RATE: 14 BRPM | HEIGHT: 65 IN | DIASTOLIC BLOOD PRESSURE: 85 MMHG

## 2023-08-16 DIAGNOSIS — R53.83 OTHER FATIGUE: Primary | ICD-10-CM

## 2023-08-16 DIAGNOSIS — E53.8 VITAMIN B12 DEFICIENCY: ICD-10-CM

## 2023-08-16 DIAGNOSIS — G62.9 NEUROPATHY: ICD-10-CM

## 2023-08-16 LAB
ALBUMIN SERPL-MCNC: 3.6 G/DL (ref 3.4–5)
ALBUMIN/GLOB SERPL: 0.8 {RATIO} (ref 1–2)
ALP LIVER SERPL-CCNC: 68 U/L
ALT SERPL-CCNC: 17 U/L
ANION GAP SERPL CALC-SCNC: 7 MMOL/L (ref 0–18)
AST SERPL-CCNC: 12 U/L (ref 15–37)
BASOPHILS # BLD AUTO: 0.05 X10(3) UL (ref 0–0.2)
BASOPHILS NFR BLD AUTO: 0.6 %
BILIRUB SERPL-MCNC: 1 MG/DL (ref 0.1–2)
BUN BLD-MCNC: 15 MG/DL (ref 7–18)
BUN/CREAT SERPL: 14.4 (ref 10–20)
CALCIUM BLD-MCNC: 9 MG/DL (ref 8.5–10.1)
CHLORIDE SERPL-SCNC: 109 MMOL/L (ref 98–112)
CO2 SERPL-SCNC: 25 MMOL/L (ref 21–32)
CREAT BLD-MCNC: 1.04 MG/DL
DEPRECATED RDW RBC AUTO: 40.7 FL (ref 35.1–46.3)
EGFRCR SERPLBLD CKD-EPI 2021: 64 ML/MIN/1.73M2 (ref 60–?)
EOSINOPHIL # BLD AUTO: 0.32 X10(3) UL (ref 0–0.7)
EOSINOPHIL NFR BLD AUTO: 3.7 %
ERYTHROCYTE [DISTWIDTH] IN BLOOD BY AUTOMATED COUNT: 13.1 % (ref 11–15)
FASTING STATUS PATIENT QL REPORTED: YES
GLOBULIN PLAS-MCNC: 4.4 G/DL (ref 2.8–4.4)
GLUCOSE BLD-MCNC: 106 MG/DL (ref 70–99)
HCT VFR BLD AUTO: 46.6 %
HGB BLD-MCNC: 15.3 G/DL
IMM GRANULOCYTES # BLD AUTO: 0.02 X10(3) UL (ref 0–1)
IMM GRANULOCYTES NFR BLD: 0.2 %
LYMPHOCYTES # BLD AUTO: 1.79 X10(3) UL (ref 1–4)
LYMPHOCYTES NFR BLD AUTO: 20.5 %
MCH RBC QN AUTO: 27.7 PG (ref 26–34)
MCHC RBC AUTO-ENTMCNC: 32.8 G/DL (ref 31–37)
MCV RBC AUTO: 84.4 FL
MONOCYTES # BLD AUTO: 0.56 X10(3) UL (ref 0.1–1)
MONOCYTES NFR BLD AUTO: 6.4 %
NEUTROPHILS # BLD AUTO: 5.99 X10 (3) UL (ref 1.5–7.7)
NEUTROPHILS # BLD AUTO: 5.99 X10(3) UL (ref 1.5–7.7)
NEUTROPHILS NFR BLD AUTO: 68.6 %
OSMOLALITY SERPL CALC.SUM OF ELEC: 293 MOSM/KG (ref 275–295)
PLATELET # BLD AUTO: 264 10(3)UL (ref 150–450)
POTASSIUM SERPL-SCNC: 4.1 MMOL/L (ref 3.5–5.1)
PROT SERPL-MCNC: 8 G/DL (ref 6.4–8.2)
RBC # BLD AUTO: 5.52 X10(6)UL
SODIUM SERPL-SCNC: 141 MMOL/L (ref 136–145)
VIT B12 SERPL-MCNC: >2000 PG/ML (ref 193–986)
WBC # BLD AUTO: 8.7 X10(3) UL (ref 4–11)

## 2023-08-16 PROCEDURE — 80053 COMPREHEN METABOLIC PANEL: CPT | Performed by: FAMILY MEDICINE

## 2023-08-16 PROCEDURE — 96372 THER/PROPH/DIAG INJ SC/IM: CPT | Performed by: FAMILY MEDICINE

## 2023-08-16 PROCEDURE — 3079F DIAST BP 80-89 MM HG: CPT | Performed by: FAMILY MEDICINE

## 2023-08-16 PROCEDURE — 99214 OFFICE O/P EST MOD 30 MIN: CPT | Performed by: FAMILY MEDICINE

## 2023-08-16 PROCEDURE — 3075F SYST BP GE 130 - 139MM HG: CPT | Performed by: FAMILY MEDICINE

## 2023-08-16 PROCEDURE — 85060 BLOOD SMEAR INTERPRETATION: CPT | Performed by: FAMILY MEDICINE

## 2023-08-16 PROCEDURE — 82607 VITAMIN B-12: CPT | Performed by: FAMILY MEDICINE

## 2023-08-16 PROCEDURE — 85025 COMPLETE CBC W/AUTO DIFF WBC: CPT | Performed by: FAMILY MEDICINE

## 2023-08-16 PROCEDURE — 36415 COLL VENOUS BLD VENIPUNCTURE: CPT | Performed by: FAMILY MEDICINE

## 2023-08-16 PROCEDURE — 3008F BODY MASS INDEX DOCD: CPT | Performed by: FAMILY MEDICINE

## 2023-08-16 RX ORDER — CYANOCOBALAMIN 1000 UG/ML
1000 INJECTION, SOLUTION INTRAMUSCULAR; SUBCUTANEOUS ONCE
Status: COMPLETED | OUTPATIENT
Start: 2023-08-16 | End: 2023-08-16

## 2023-08-16 RX ADMIN — CYANOCOBALAMIN 1000 MCG: 1000 INJECTION, SOLUTION INTRAMUSCULAR; SUBCUTANEOUS at 11:35:00

## 2023-08-16 NOTE — PROGRESS NOTES
Subjective:   Patient ID: Asaf Tai is a 46year old female. HPI  Not feeling well   Everything hurts   Also has tingling in her legs bilaterally   Has a lot of pain inner aspect legs   History/Other:   Review of Systems   Constitutional:  Positive for fatigue. Negative for activity change, appetite change and unexpected weight change. Respiratory:  Negative for cough, chest tightness and shortness of breath. Cardiovascular: Negative. Negative for chest pain and leg swelling. Gastrointestinal:         Bloated   Musculoskeletal:  Positive for back pain and myalgias. Psychiatric/Behavioral:  Positive for dysphoric mood and sleep disturbance. Negative for agitation. The patient is not nervous/anxious. Current Outpatient Medications   Medication Sig Dispense Refill    chlorthalidone 25 MG Oral Tab Take 1 tablet (25 mg total) by mouth daily. For high blood pressure or edema 90 tablet 1    fluconazole (DIFLUCAN) 150 MG Oral Tab Take 1 tablet (150 mg total) by mouth every other day. 2 tablet 0    celecoxib 200 MG Oral Cap Take 1 capsule (200 mg total) by mouth daily. 30 capsule 0    escitalopram 20 MG Oral Tab Take 1 tablet (20 mg total) by mouth daily. 90 tablet 3    tiZANidine 4 MG Oral Tab Take 0.5 tablets (2 mg total) by mouth every 8 (eight) hours as needed. 40 tablet 0    zolpidem 10 MG Oral Tab Take 1 tablet (10 mg total) by mouth nightly. 30 tablet 0    triamcinolone 0.1 % External Ointment Apply 1 g topically 2 (two) times daily. As needed to effected area (rash) 45 g 0    Phentermine HCl 15 MG Oral Cap Take 1 capsule (15 mg total) by mouth every morning. 30 capsule 2    topiramate 25 MG Oral Tab Take 1 tablet (25 mg total) by mouth 2 (two) times daily. 120 tablet 0    metoprolol succinate ER (TOPROL XL) 50 MG Oral Tablet 24 Hr Take 1 tablet (50 mg total) by mouth daily. 90 tablet 3    buPROPion  MG Oral Tablet 24 Hr Take 1 tablet (150 mg total) by mouth daily.  1 po every day 90 tablet 0    montelukast (SINGULAIR) 10 MG Oral Tab Take 1 tablet (10 mg total) by mouth daily. 30 tablet 1     Allergies:No Known Allergies    Objective:   Physical Exam  Constitutional:       Appearance: She is well-developed. Cardiovascular:      Rate and Rhythm: Normal rate and regular rhythm. Heart sounds: Normal heart sounds. No murmur heard. No gallop. Pulmonary:      Effort: Pulmonary effort is normal. No respiratory distress. Breath sounds: Normal breath sounds. No wheezing. Abdominal:      General: There is no distension. Tenderness: There is no abdominal tenderness. Musculoskeletal:         General: Tenderness present. No deformity. Normal range of motion. Neurological:      Deep Tendon Reflexes: Reflexes are normal and symmetric.          Assessment & Plan:   Other fatigue  (primary encounter diagnosis)  (R53.83) Other fatigue  (primary encounter diagnosis)  Plan: COMP METABOLIC PANEL (14), CBC WITH         DIFFERENTIAL WITH PLATELET, VITAMIN MD RATNA         BLOOD SMEAR CONSULT            (E53.8) Vitamin B12 deficiency  Plan: cyanocobalamin (Vitamin B12) 1000 MCG/ML         injection 1,000 mcg            (G62.9) Neuropathy  Plan: NEURO - INTERNAL              Orders Placed This Encounter      Comp Metabolic Panel (14)      CBC With Differential With Platelet      Vitamin B12      Meds This Visit:  Requested Prescriptions      No prescriptions requested or ordered in this encounter       Imaging & Referrals:  None

## 2023-08-17 ENCOUNTER — PATIENT MESSAGE (OUTPATIENT)
Dept: FAMILY MEDICINE CLINIC | Facility: CLINIC | Age: 53
End: 2023-08-17

## 2023-08-29 RX ORDER — TOPIRAMATE 25 MG/1
25 TABLET ORAL 2 TIMES DAILY
Qty: 120 TABLET | Refills: 0 | Status: SHIPPED | OUTPATIENT
Start: 2023-08-29

## 2023-09-18 RX ORDER — MONTELUKAST SODIUM 10 MG/1
10 TABLET ORAL DAILY
Qty: 90 TABLET | Refills: 3 | Status: SHIPPED | OUTPATIENT
Start: 2023-09-18 | End: 2024-09-12

## 2023-09-18 RX ORDER — TIZANIDINE 4 MG/1
2 TABLET ORAL EVERY 8 HOURS PRN
Qty: 40 TABLET | Refills: 0 | Status: SHIPPED | OUTPATIENT
Start: 2023-09-18

## 2023-09-18 RX ORDER — ESCITALOPRAM OXALATE 20 MG/1
20 TABLET ORAL DAILY
Qty: 90 TABLET | Refills: 3 | OUTPATIENT
Start: 2023-09-18

## 2023-09-18 RX ORDER — ZOLPIDEM TARTRATE 10 MG/1
10 TABLET ORAL NIGHTLY
Qty: 30 TABLET | Refills: 0 | Status: SHIPPED | OUTPATIENT
Start: 2023-09-18 | End: 2024-09-12

## 2023-09-18 NOTE — TELEPHONE ENCOUNTER
Refill passed per [place] [employer] protocol. Requested Prescriptions   Pending Prescriptions Disp Refills    montelukast (SINGULAIR) 10 MG Oral Tab 30 tablet 1     Sig: Take 1 tablet (10 mg total) by mouth daily. Asthma & COPD Medication Protocol Passed - 9/17/2023  7:03 PM        Passed - In person appointment or virtual visit in the past 6 mos or appointment in next 3 mos     Recent Outpatient Visits              1 month ago Other fatigue    Sae Nieto MD    Office Visit    2 months ago Encounter for screening mammogram for breast cancer    [facility], Sally Zayas MD    Office Visit    3 months ago Injury    [address] [facility], Ed[place] Gonsalves, Kendra Richardson MD    Office Visit    4 months ago HTN (hypertension), benign    Amber Lazaro MD    Office Visit    5 months ago Cough, unspecified type    [facility], [address] [facility], Sally Zayas MD    Office Visit          Future Appointments         Provider Department Appt Notes    In 1 week MD Horacio French, [address], [place] Cortisone shot    In 1 week MD Horacio Ching, [address], [place] [facility]    In 1 week MD Horacio Benitez, [address] [facility] [place] Pap smear and B12 shot    In 2 weeks [facility] [address]     In 3 months MD Horacio Ching, [address], [facility] management                     Refused Prescriptions Disp Refills    escitalopram 20 MG Oral Tab 90 tablet 3     Sig: Take 1 tablet (20 mg total) by mouth daily.        Psychiatric Non-Scheduled (Anti-Anxiety) Passed - 9/17/2023  7:03 PM        Passed - In person appointment or virtual visit in the past 6 mos or appointment in next 3 mos     Recent Outpatient Visits              1 month ago Other fatigue    Kedar Stephen MD    Office Visit    2 months ago Encounter for screening mammogram for breast cancer    Marcia Florentino MD    Office Visit    3 months ago Injury    Yousif Sales MD    Office Visit    4 months ago HTN (hypertension), benign    Gayla Soto MD    Office Visit    5 months ago Cough, unspecified type    Kedar Stephen MD    Office Visit          Future Appointments         Provider Department Appt Notes    In 1 week Stacy Chand MD [address], [address], [place] Cortisone shot    In 1 week Debbie Layton MD [address], [address], [place]    In 1 week Abhishek Pal MD [address], [address] [place], [place] Pap smear and B12 shot    In 2 weeks [address]     In 3 months Debbie Layton MD [address], [address], [employer] management                       Recent Outpatient Visits              1 month ago Other fatigue    Marcia Florentino MD    Office Visit    2 months ago Encounter for screening mammogram for breast cancer    Kedar Stephen MD    Office Visit    3 months ago Injury    Jessie Sales Later, Carolyn Workman MD    Office Visit    4 months ago HTN (hypertension), benign    Gayla Soto MD    Office Visit    5 months ago Cough, unspecified type Lion Murphy MD    Office Visit          Future Appointments         Provider Department Appt Notes    In 1 week MD Roseanne Kim, [address], [place] Cortisone shot    In 1 week MD Roseanne Rey, [address], [place]    In 1 week MD Roseanne Sharma, [facility] Pap smear and B12 shot    In 2 weeks [address]     In 3 months MD Roseanne Rey, [address], [facility]

## 2023-09-20 RX ORDER — TERBINAFINE HYDROCHLORIDE 250 MG/1
250 TABLET ORAL DAILY
Qty: 7 TABLET | Refills: 0 | Status: SHIPPED | OUTPATIENT
Start: 2023-09-20 | End: 2023-12-13

## 2023-09-20 RX ORDER — CELECOXIB 200 MG/1
200 CAPSULE ORAL DAILY
Qty: 30 CAPSULE | Refills: 0 | Status: SHIPPED | OUTPATIENT
Start: 2023-09-20

## 2023-09-20 NOTE — TELEPHONE ENCOUNTER
Please review; no protocol  Medication pended for your review and approval.    Requested Prescriptions   Pending Prescriptions Disp Refills    TERBINAFINE 250 MG Oral Tab [Pharmacy Med Name: Terbinafine Hydrochloride 250 Mg Tab Nort] 7 tablet 0     Sig: Take 1 tablet (250 mg total) by mouth daily.        There is no refill protocol information for this order

## 2023-09-25 ENCOUNTER — OFFICE VISIT (OUTPATIENT)
Dept: ORTHOPEDICS CLINIC | Facility: CLINIC | Age: 53
End: 2023-09-25

## 2023-09-25 VITALS
HEIGHT: 64 IN | WEIGHT: 264 LBS | SYSTOLIC BLOOD PRESSURE: 125 MMHG | BODY MASS INDEX: 45.07 KG/M2 | DIASTOLIC BLOOD PRESSURE: 90 MMHG

## 2023-09-25 DIAGNOSIS — M17.11 PRIMARY OSTEOARTHRITIS OF RIGHT KNEE: Primary | ICD-10-CM

## 2023-09-25 RX ORDER — TRIAMCINOLONE ACETONIDE 40 MG/ML
40 INJECTION, SUSPENSION INTRA-ARTICULAR; INTRAMUSCULAR ONCE
Status: COMPLETED | OUTPATIENT
Start: 2023-09-25 | End: 2023-09-25

## 2023-09-25 RX ADMIN — TRIAMCINOLONE ACETONIDE 40 MG: 40 INJECTION, SUSPENSION INTRA-ARTICULAR; INTRAMUSCULAR at 11:35:00

## 2023-09-25 NOTE — PROGRESS NOTES
Per verbal order from Dr. Alden Gavin, draw up and 4ml of 0.5% Marcaine and 1ml of Kenalog 40 for injection into rightknee. Drew Self MA  Patient provided education handout for cortisone injection.

## 2023-10-03 ENCOUNTER — OFFICE VISIT (OUTPATIENT)
Dept: FAMILY MEDICINE CLINIC | Facility: CLINIC | Age: 53
End: 2023-10-03

## 2023-10-03 VITALS
SYSTOLIC BLOOD PRESSURE: 137 MMHG | HEIGHT: 64 IN | DIASTOLIC BLOOD PRESSURE: 87 MMHG | WEIGHT: 261.38 LBS | HEART RATE: 58 BPM | BODY MASS INDEX: 44.62 KG/M2 | RESPIRATION RATE: 16 BRPM | OXYGEN SATURATION: 99 %

## 2023-10-03 DIAGNOSIS — N93.9: ICD-10-CM

## 2023-10-03 DIAGNOSIS — R53.83 OTHER FATIGUE: ICD-10-CM

## 2023-10-03 DIAGNOSIS — D68.9: ICD-10-CM

## 2023-10-03 DIAGNOSIS — Z12.4 SCREENING FOR CERVICAL CANCER: Primary | ICD-10-CM

## 2023-10-03 DIAGNOSIS — Z12.11 SCREENING FOR COLON CANCER: ICD-10-CM

## 2023-10-03 PROCEDURE — 99396 PREV VISIT EST AGE 40-64: CPT | Performed by: FAMILY MEDICINE

## 2023-10-03 PROCEDURE — 3075F SYST BP GE 130 - 139MM HG: CPT | Performed by: FAMILY MEDICINE

## 2023-10-03 PROCEDURE — 96372 THER/PROPH/DIAG INJ SC/IM: CPT | Performed by: FAMILY MEDICINE

## 2023-10-03 PROCEDURE — 3079F DIAST BP 80-89 MM HG: CPT | Performed by: FAMILY MEDICINE

## 2023-10-03 PROCEDURE — 3008F BODY MASS INDEX DOCD: CPT | Performed by: FAMILY MEDICINE

## 2023-10-03 RX ORDER — CYANOCOBALAMIN 1000 UG/ML
1000 INJECTION, SOLUTION INTRAMUSCULAR; SUBCUTANEOUS ONCE
Status: COMPLETED | OUTPATIENT
Start: 2023-10-03 | End: 2023-10-03

## 2023-10-03 RX ADMIN — CYANOCOBALAMIN 1000 MCG: 1000 INJECTION, SOLUTION INTRAMUSCULAR; SUBCUTANEOUS at 13:35:00

## 2023-10-03 NOTE — PROGRESS NOTES
Subjective:   Patient ID: Soheila Palumbo is a 46year old female. HPI  Here for annual physical  Feeling better     History/Other:   Review of Systems  Constitutional: Negative. Negative for activity change, appetite change, diaphoresis and fatigue. Respiratory: Negative. Negative for apnea, cough, chest tightness and shortness of breath. Cardiovascular: Negative. Negative for chest pain, palpitations and leg swelling. Gastrointestinal: Negative. Negative for abdominal pain. Skin: Negative. Psychiatric/Behavioral: better with depression        Current Outpatient Medications   Medication Sig Dispense Refill    celecoxib 200 MG Oral Cap Take 1 capsule (200 mg total) by mouth daily. 30 capsule 0    terbinafine 250 MG Oral Tab Take 1 tablet (250 mg total) by mouth daily. 7 tablet 0    montelukast (SINGULAIR) 10 MG Oral Tab Take 1 tablet (10 mg total) by mouth daily. 90 tablet 3    tiZANidine 4 MG Oral Tab Take 0.5 tablets (2 mg total) by mouth every 8 (eight) hours as needed. 40 tablet 0    zolpidem 10 MG Oral Tab Take 1 tablet (10 mg total) by mouth nightly. 30 tablet 0    triamcinolone 0.1 % External Ointment Apply 1 g topically 2 (two) times daily. As needed to effected area (rash) 45 g 0    topiramate 25 MG Oral Tab Take 1 tablet (25 mg total) by mouth 2 (two) times daily. 120 tablet 0    chlorthalidone 25 MG Oral Tab Take 1 tablet (25 mg total) by mouth daily. For high blood pressure or edema 90 tablet 1    fluconazole (DIFLUCAN) 150 MG Oral Tab Take 1 tablet (150 mg total) by mouth every other day. 2 tablet 0    escitalopram 20 MG Oral Tab Take 1 tablet (20 mg total) by mouth daily. 90 tablet 3    Phentermine HCl 15 MG Oral Cap Take 1 capsule (15 mg total) by mouth every morning. 30 capsule 2    metoprolol succinate ER (TOPROL XL) 50 MG Oral Tablet 24 Hr Take 1 tablet (50 mg total) by mouth daily.  90 tablet 3    buPROPion  MG Oral Tablet 24 Hr Take 1 tablet (150 mg total) by mouth daily. 1 po every day 90 tablet 0     Allergies:No Known Allergies    Objective:   Physical Exam  Constitutional:       General: She is not in acute distress. Appearance: She is well-developed. She is obese. HENT:      Head: Normocephalic. Eyes:      General:         Right eye: No discharge. Left eye: No discharge. Neck:      Thyroid: No thyromegaly. Vascular: No JVD. Cardiovascular:      Rate and Rhythm: Normal rate. Heart sounds: Normal heart sounds. Pulmonary:      Effort: No respiratory distress. Breath sounds: Normal breath sounds. No wheezing. Chest:      Chest wall: No tenderness. Abdominal:      General: Bowel sounds are normal. There is no distension. Palpations: Abdomen is soft. There is no mass. Tenderness: There is no abdominal tenderness. There is no guarding or rebound. Hernia: There is no hernia in the left inguinal area. Genitourinary:     Vagina: Normal. No vaginal discharge (prev). Cervix: No cervical motion tenderness, discharge or friability. Uterus: Not deviated, not enlarged, not fixed and not tender. Adnexa:         Right: No mass, tenderness or fullness. Left: No mass, tenderness or fullness. Comments: Blood on the cervical os   Musculoskeletal:         General: Normal range of motion. Cervical back: Neck supple. Skin:     General: Skin is warm and dry. Neurological:      Mental Status: She is alert and oriented to person, place, and time. Deep Tendon Reflexes: Reflexes are normal and symmetric. Psychiatric:         Behavior: Behavior normal.         Thought Content:  Thought content normal.         Assessment & Plan:   Screening for cervical cancer  (primary encounter diagnosis)  Screening for colon cancer  Other fatigue  Abnormal uterine bleeding due to coagulopathy ([facility] [address]  Will need to do us pelvis and see ob gyn due to uterine blood  Orders Placed This Encounter      Hpv Dna  High Risk , Thin Prep Collect      ThinPrep PAP Smear      Meds This Visit:  Requested Prescriptions      No prescriptions requested or ordered in this encounter       Imaging & Referrals:  OBG - INTERNAL  OP REFERRAL TO [facility] GI TELEPHONE COLON SCREEN  US PELVIS (TRANSABDOMINAL AND TRANSVAGINAL) (CPT=76856/38873)

## 2023-10-04 LAB — HPV I/H RISK 1 DNA SPEC QL NAA+PROBE: NEGATIVE

## 2023-10-09 DIAGNOSIS — E66.01 MORBID OBESITY WITH BMI OF 40.0-44.9, ADULT (HCC): ICD-10-CM

## 2023-10-10 ENCOUNTER — HOSPITAL ENCOUNTER (OUTPATIENT)
Dept: ULTRASOUND IMAGING | Age: 53
Discharge: HOME | End: 2023-10-10
Attending: FAMILY MEDICINE
Payer: MEDICAID

## 2023-10-10 DIAGNOSIS — D68.9: ICD-10-CM

## 2023-10-10 DIAGNOSIS — N93.9: ICD-10-CM

## 2023-10-10 PROCEDURE — 76856 US EXAM PELVIC COMPLETE: CPT | Performed by: FAMILY MEDICINE

## 2023-10-10 PROCEDURE — 76830 TRANSVAGINAL US NON-OB: CPT | Performed by: FAMILY MEDICINE

## 2023-10-10 RX ORDER — TOPIRAMATE 25 MG/1
25 TABLET ORAL 2 TIMES DAILY
Qty: 120 TABLET | Refills: 0 | Status: SHIPPED | OUTPATIENT
Start: 2023-10-10

## 2023-10-10 RX ORDER — PHENTERMINE HYDROCHLORIDE 15 MG/1
15 CAPSULE ORAL EVERY MORNING
Qty: 30 CAPSULE | Refills: 2 | Status: SHIPPED | OUTPATIENT
Start: 2023-10-10

## 2023-10-11 RX ORDER — TERBINAFINE HYDROCHLORIDE 250 MG/1
250 TABLET ORAL DAILY
Qty: 7 TABLET | Refills: 0 | Status: SHIPPED | OUTPATIENT
Start: 2023-10-11 | End: 2024-01-03

## 2023-10-11 RX ORDER — TRIAMCINOLONE ACETONIDE 1 MG/G
1 OINTMENT TOPICAL 2 TIMES DAILY
Qty: 45 G | Refills: 0 | Status: SHIPPED | OUTPATIENT
Start: 2023-10-11

## 2023-10-11 NOTE — TELEPHONE ENCOUNTER
Please review; no protocol  Medication pended for your review and approval.     Requested Prescriptions   Pending Prescriptions Disp Refills    triamcinolone 0.1 % External Ointment 45 g 0     Sig: Apply 1 g topically 2 (two) times daily. As needed to effected area (rash)       There is no refill protocol information for this order       terbinafine 250 MG Oral Tab 7 tablet 0     Sig: Take 1 tablet (250 mg total) by mouth daily.        There is no refill protocol information for this order

## 2023-10-19 RX ORDER — CELECOXIB 200 MG/1
200 CAPSULE ORAL DAILY
Qty: 30 CAPSULE | Refills: 0 | Status: SHIPPED | OUTPATIENT
Start: 2023-10-19

## 2023-10-19 NOTE — TELEPHONE ENCOUNTER
Rx request for celebrix, please review and sign off if appropriate. Thank you.      Last seen: 9/25/23  Last refill: 9/20/23 # 30 with 0 refills

## 2023-10-20 NOTE — TELEPHONE ENCOUNTER
Failed Protocol/No Protocol. Requested Prescriptions   Pending Prescriptions Disp Refills    zolpidem 10 MG Oral Tab 30 tablet 0     Sig: Take 1 tablet (10 mg total) by mouth nightly.        There is no refill protocol information for this order          Future Appointments         Provider Department Appt Notes    In 1 week Nithin Urias DO [employer], [address], [address] abnormal bleeding    In 1 month Jimbo Lewis MD [employer], [address], [place] Follow up    In 1 month [facility] OF [address]     In 4 months Jimbo Lewis MD [employer], [address], [facility]          Recent Outpatient Visits              2 weeks ago Screening for cervical cancer    Brian Aguilar MD    Office Visit    3 weeks ago Primary osteoarthritis of right knee    Clay Lehman MD    Office Visit    2 months ago Other fatigue    Brian Aguilar MD    Office Visit    3 months ago Encounter for screening mammogram for breast cancer    Brian Aguilar MD    Office Visit    4 months ago Injury    Clay Lehman MD    Office Visit

## 2023-10-22 RX ORDER — ZOLPIDEM TARTRATE 10 MG/1
10 TABLET ORAL NIGHTLY
Qty: 30 TABLET | Refills: 0 | Status: SHIPPED | OUTPATIENT
Start: 2023-10-22 | End: 2024-10-16

## 2023-11-01 ENCOUNTER — OFFICE VISIT (OUTPATIENT)
Dept: OBGYN CLINIC | Facility: CLINIC | Age: 53
End: 2023-11-01
Payer: MEDICAID

## 2023-11-01 VITALS
SYSTOLIC BLOOD PRESSURE: 131 MMHG | HEART RATE: 64 BPM | WEIGHT: 260.38 LBS | DIASTOLIC BLOOD PRESSURE: 88 MMHG | BODY MASS INDEX: 45 KG/M2

## 2023-11-01 DIAGNOSIS — N95.0 PMB (POSTMENOPAUSAL BLEEDING): Primary | ICD-10-CM

## 2023-11-01 PROCEDURE — 3075F SYST BP GE 130 - 139MM HG: CPT | Performed by: OBSTETRICS & GYNECOLOGY

## 2023-11-01 PROCEDURE — 3079F DIAST BP 80-89 MM HG: CPT | Performed by: OBSTETRICS & GYNECOLOGY

## 2023-11-01 PROCEDURE — 99203 OFFICE O/P NEW LOW 30 MIN: CPT | Performed by: OBSTETRICS & GYNECOLOGY

## 2023-11-01 RX ORDER — MISOPROSTOL 200 UG/1
TABLET ORAL
Qty: 1 TABLET | Refills: 0 | Status: SHIPPED | OUTPATIENT
Start: 2023-11-01

## 2023-11-01 NOTE — PROGRESS NOTES
HPI:  The patient is a 45 yo F c/o PMB. LMP 2 years ago and having intermittent bleeding over the last few months. Saw her PCP and did pap smear and TVUS. Pap wnl. TVUS with eML 5mm. US PELVIS (TRANSABDOMINAL AND TRANSVAGINAL) (KKX=94701/92714)    Result Date: 10/10/2023  PROCEDURE: US PELVIS TRANSABDOMINAL AND TRANSVAGINAL (CPT=76856/10413)  COMPARISON: None. INDICATIONS: Abnormal uterine bleeding. TECHNIQUE: Pelvic ultrasound using transabdominal and transvaginal technique. A transvaginal scan was performed for better assessment of the uterus and adnexal structures. FINDINGS:   LIMITATIONS:    The examination is limited due to patient's body habitus. UTERUS:   Anteverted. Size is 8.6 x 3.7 x 5.6 cm. ENDOMETRIUM: Endometrial thickness is 5 mm. No fluid or mass in the endometrial canal.  MYOMETRIUM: Normal echogenicity. No masses. There is a small hypoechoic nabothian cyst seen in the cervix. OVARIES AND ADNEXA:   RIGHT:   The right ovary is not sonographically visible due to overlying bowel gas. LEFT:   The left ovary is not sonographically visible due to overlying bowel gas. CUL-DE-SAC:   Normal.  No free fluid or mass. OTHER: Negative. Bladder appears normal.          CONCLUSION:  1. Unremarkable sonographic appearance of the uterus. 2. Ovaries are not sonographically visible due to overlying bowel gas. No free pelvic fluid.     Dictated by (CST): Jose Ramon Hung MD on 10/10/2023 at 12:41 PM     Finalized by (CST): Jose Ramon Hung MD on 10/10/2023 at 12:42 PM           Reviewed medical and surgical history below       OBSTETRICS HISTORY:  OB History     T1    L1    SAB0  IAB0  Ectopic0  Multiple0  Live Births1     GYNE HISTORY:    Sexual activity:   Not on file            MEDICAL HISTORY:  Past Medical History:   Diagnosis Date    DJD (degenerative joint disease) of knee     HTN (hypertension), benign     Morbid obesity with BMI of 40.0-44.9, adult ([facility] [address] SURGICAL HISTORY:  Past Surgical History:   Procedure Laterality Date          KNEE REPLACEMENT SURGERY      TONSILLECTOMY         SOCIAL HISTORY:  Social History    Socioeconomic History      Marital status: Single      Spouse name: Not on file      Number of children: Not on file      Years of education: Not on file      Highest education level: Not on file    Occupational History      Not on file    Tobacco Use      Smoking status: Never      Smokeless tobacco: Never    Vaping Use      Vaping Use: Never used    Substance and Sexual Activity      Alcohol use: Yes        Comment: socially      Drug use: No      Sexual activity: Not on file    Other Topics      Concerns:        Not on file    Social History Narrative      Not on file    Social Determinants of Health  Financial Resource Strain: Not on file  Food Insecurity: Not on file  Transportation Needs: Not on file  Physical Activity: Not on file  Stress: Not on file  Social Connections: Not on file  Housing Stability: Not on file    FAMILY HISTORY:  No family history on file. MEDICATIONS:    Current Outpatient Medications:     miSOPROStol (CYTOTEC) 200 MCG Oral Tab, Take 1 tablet the night before your endometrial biopsy, Disp: 1 tablet, Rfl: 0    zolpidem 10 MG Oral Tab, Take 1 tablet (10 mg total) by mouth nightly., Disp: 30 tablet, Rfl: 0    celecoxib 200 MG Oral Cap, Take 1 capsule (200 mg total) by mouth daily. , Disp: 30 capsule, Rfl: 0    triamcinolone 0.1 % External Ointment, Apply 1 g topically 2 (two) times daily. As needed to effected area (rash), Disp: 45 g, Rfl: 0    terbinafine 250 MG Oral Tab, Take 1 tablet (250 mg total) by mouth daily. , Disp: 7 tablet, Rfl: 0    Phentermine HCl 15 MG Oral Cap, Take 1 capsule (15 mg total) by mouth every morning., Disp: 30 capsule, Rfl: 2    topiramate 25 MG Oral Tab, Take 1 tablet (25 mg total) by mouth 2 (two) times daily. , Disp: 120 tablet, Rfl: 0    montelukast (SINGULAIR) 10 MG Oral Tab, Take 1 tablet (10 mg total) by mouth daily. , Disp: 90 tablet, Rfl: 3    tiZANidine 4 MG Oral Tab, Take 0.5 tablets (2 mg total) by mouth every 8 (eight) hours as needed. , Disp: 40 tablet, Rfl: 0    chlorthalidone 25 MG Oral Tab, Take 1 tablet (25 mg total) by mouth daily. For high blood pressure or edema, Disp: 90 tablet, Rfl: 1    fluconazole (DIFLUCAN) 150 MG Oral Tab, Take 1 tablet (150 mg total) by mouth every other day., Disp: 2 tablet, Rfl: 0    escitalopram 20 MG Oral Tab, Take 1 tablet (20 mg total) by mouth daily. , Disp: 90 tablet, Rfl: 3    metoprolol succinate ER (TOPROL XL) 50 MG Oral Tablet 24 Hr, Take 1 tablet (50 mg total) by mouth daily. , Disp: 90 tablet, Rfl: 3    buPROPion  MG Oral Tablet 24 Hr, Take 1 tablet (150 mg total) by mouth daily. 1 po every day, Disp: 90 tablet, Rfl: 0    ALLERGIES:  No Known Allergies      Review of Systems:  Constitutional:  Denies fevers and chills   Cardiovascular:  denies chest pain or palpitations  Respiratory:  denies shortness of breath  Gastrointestinal:  denies heartburn, abdominal pain, diarrhea or constipation  Genitourinary:  denies dysuria, incontinence, abnormal vaginal discharge, vaginal itching  Musculoskeletal:  denies back pain. Skin/Breast:  Denies any breast pain, lumps, or discharge. Neurological:  denies headaches, extremity weakness  Psychiatric: denies depression or anxiety.        11/01/23  0931   BP: 131/88   Pulse: 64       PHYSICAL EXAM:   Constitutional: well developed, well nourished  Head/Face: normocephalic  Abdomen:  soft, nontender, nondistended, no masses; obese  Psychiatric: Appropriate mood and affect    Pelvic Exam:  External Genitalia: normal appearance, hair distribution, and no lesions  Urethral Meatus:  normal in size, location, without lesions and prolapse  Bladder:  No fullness, masses or tenderness  Vagina:  Normal appearance without lesions, no abnormal discharge  Cervix:  Normal without tenderness on motion  Uterus: normal in size, contour, position, mobility, without tenderness  Adnexa: normal without masses or tenderness  Perineum: normal    Assessment/Plan:    Eileen was seen today for gyn problem. Diagnoses and all orders for this visit:    PMB (postmenopausal bleeding)    Other orders  -     miSOPROStol (CYTOTEC) 200 MCG Oral Tab;  Take 1 tablet the night before your endometrial biopsy      DDX d/w pt  [facility] [place] [facility]  R/b reviewed  All questions answered

## 2023-11-06 ENCOUNTER — TELEPHONE (OUTPATIENT)
Dept: OBGYN CLINIC | Facility: CLINIC | Age: 53
End: 2023-11-06

## 2023-11-06 ENCOUNTER — OFFICE VISIT (OUTPATIENT)
Dept: OBGYN CLINIC | Facility: CLINIC | Age: 53
End: 2023-11-06
Payer: MEDICAID

## 2023-11-06 VITALS
SYSTOLIC BLOOD PRESSURE: 116 MMHG | WEIGHT: 261.38 LBS | BODY MASS INDEX: 45 KG/M2 | DIASTOLIC BLOOD PRESSURE: 80 MMHG | HEART RATE: 63 BPM

## 2023-11-06 DIAGNOSIS — N95.0 PMB (POSTMENOPAUSAL BLEEDING): Primary | ICD-10-CM

## 2023-11-06 NOTE — PROCEDURES
Endometrial Biopsy     Pre-Procedure Care:   Consent was obtained. Procedure/risks were explained. Questions were answered. Correct patient was identified. Correct side and site were confirmed. Indication: PMB    Pre-Medications: The patient was premedicated with Cytotec. Description of Procedure:   A bivalve speculum was placed in the vagina and the cervix was prepped with betadine solution. Single tooth tenaculum placed at the 12 o'clock position. Cervical stenosis encountered. Minidilators attempted to dilate cervix. Unable to pass dilators or EMB pipelle after multiple attempts. PROCEDURE ABORTED. The single tooth tenaculum was removed. Silver nitrate was applied at the site of tenaculum application   Good hemostasis was noted. There were no complications. There was no blood loss. Discharge instructions were provided to the patient.     Plan for D&C/HSC/Truclear currettage  Risks of surgery including bleeding, infection, injury, uterine perforation all d/w pt  Msg to surgery scheduler

## 2023-11-07 RX ORDER — ESCITALOPRAM OXALATE 20 MG/1
20 TABLET ORAL DAILY
Qty: 90 TABLET | Refills: 3 | OUTPATIENT
Start: 2023-11-07

## 2023-11-14 RX ORDER — METOPROLOL SUCCINATE 50 MG/1
50 TABLET, EXTENDED RELEASE ORAL DAILY
Qty: 90 TABLET | Refills: 3 | Status: CANCELLED | OUTPATIENT
Start: 2023-11-14 | End: 2024-11-08

## 2023-11-15 RX ORDER — TERBINAFINE HYDROCHLORIDE 250 MG/1
250 TABLET ORAL DAILY
Qty: 7 TABLET | Refills: 0 | Status: SHIPPED | OUTPATIENT
Start: 2023-11-15

## 2023-11-15 RX ORDER — TIZANIDINE 4 MG/1
2 TABLET ORAL EVERY 8 HOURS PRN
Qty: 40 TABLET | Refills: 0 | Status: SHIPPED | OUTPATIENT
Start: 2023-11-15

## 2023-11-15 RX ORDER — TRIAMCINOLONE ACETONIDE 1 MG/G
1 OINTMENT TOPICAL 2 TIMES DAILY
Qty: 45 G | Refills: 0 | Status: SHIPPED | OUTPATIENT
Start: 2023-11-15

## 2023-11-15 RX ORDER — FLUCONAZOLE 150 MG/1
150 TABLET ORAL EVERY OTHER DAY
Qty: 2 TABLET | Refills: 0 | Status: SHIPPED | OUTPATIENT
Start: 2023-11-15

## 2023-11-15 RX ORDER — BUPROPION HYDROCHLORIDE 150 MG/1
150 TABLET ORAL DAILY
Qty: 90 TABLET | Refills: 2 | Status: SHIPPED | OUTPATIENT
Start: 2023-11-15

## 2023-11-15 RX ORDER — CHLORTHALIDONE 25 MG/1
25 TABLET ORAL DAILY
Qty: 90 TABLET | Refills: 1 | Status: SHIPPED | OUTPATIENT
Start: 2023-11-15

## 2023-11-15 NOTE — TELEPHONE ENCOUNTER
Refill passed per [place] [employer] [place] protocol. Requested Prescriptions   Pending Prescriptions Disp Refills    buPROPion  MG Oral Tablet 24 Hr 90 tablet 0     Sig: Take 1 tablet (150 mg total) by mouth daily. 1 po every day       Psychiatric Non-Scheduled (Anti-Anxiety) Passed - 11/14/2023 11:57 AM        Passed - In person appointment or virtual visit in the past 6 mos or appointment in next 3 mos     Recent Outpatient Visits              1 week ago PMB (postmenopausal bleeding)    [facility], [address], [address] Justin Hancock, DO    Office Visit    2 weeks ago PMB (postmenopausal bleeding)    [facility], [address], [address] Justin Hancock, DO    Office Visit    1 month ago Screening for cervical cancer    Carolyn Freitas MD    Office Visit    1 month ago Primary osteoarthritis of right knee    [address], [address] [facility],[address], Kelin Hays MD    Office Visit    3 months ago Other fatigue    Carolyn Freitas MD    Office Visit          Future Appointments         Provider Department Appt Notes    In 1 week Jerrell Lord MD [address], [address] [facility] [place] Follow up    In 1 week [address]     In 3 months Jerrell Lord MD [address], [address] [facility] management                 chlorthalidone 25 MG Oral Tab 90 tablet 1     Sig: Take 1 tablet (25 mg total) by mouth daily.  For high blood pressure or edema       Hypertensive Medications Protocol Passed - 11/14/2023 11:57 AM        Passed - In person appointment in the past 12 or next 3 months     Recent Outpatient Visits              1 week ago PMB (postmenopausal bleeding)    [address], [address] [facility],[address], [address] [place] [address] Nicolette Rogel DO    Office Visit    2 weeks ago PMB (postmenopausal bleeding)    [facility], [address], [address] Raphael Hancock DO    Office Visit    1 month ago Screening for cervical cancer    Chris Torres, [place], MD Latoya    Office Visit    1 month ago Primary osteoarthritis of right knee    [facility], [address], Dawson Campos MD    Office Visit    3 months ago Other fatigue    [facility], [address], Bubba Oneal MD    Office Visit          Future Appointments         Provider Department Appt Notes    In 1 week Julieta Garcia MD [facility], [address], [place] Follow up    In 1 week [facility] OF [address]     In 3 months Julieta Garcia MD [facility], [address], [facility] management               Passed - Last BP reading less than 140/90     BP Readings from Last 1 Encounters:   11/06/23 116/80               Passed - CMP or BMP in past 6 months     Recent Results (from the past 4392 hour(s))   Comp Metabolic Panel (14)    Collection Time: 08/16/23 11:45 AM   Result Value Ref Range    Glucose 106 (H) 70 - 99 mg/dL    Sodium 141 136 - 145 mmol/L    Potassium 4.1 3.5 - 5.1 mmol/L    Chloride 109 98 - 112 mmol/L    CO2 25.0 21.0 - 32.0 mmol/L    Anion Gap 7 0 - 18 mmol/L    BUN 15 7 - 18 mg/dL    Creatinine 1.04 (H) 0.55 - 1.02 mg/dL    BUN/CREA Ratio 14.4 10.0 - 20.0    Calcium, Total 9.0 8.5 - 10.1 mg/dL    Calculated Osmolality 293 275 - 295 mOsm/kg    eGFR-Cr 64 >=60 mL/min/1.73m2    ALT 17 13 - 56 U/L    AST 12 (L) 15 - 37 U/L    Alkaline Phosphatase 68 41 - 108 U/L    Bilirubin, Total 1.0 0.1 - 2.0 mg/dL    Total Protein 8.0 6.4 - 8.2 g/dL    Albumin 3.6 3.4 - 5.0 g/dL    Globulin  4.4 2.8 - 4.4 g/dL    A/G Ratio 0.8 (L) 1.0 - 2.0    Patient Fasting for CMP?  Yes      *Note: Due to a large number of results and/or encounters for the requested time period, some results have not been displayed. A complete set of results can be found in Results Review. Passed - In person appointment or virtual visit in the past 6 months     Recent Outpatient Visits              1 week ago PMB (postmenopausal bleeding)    [facility], [address], [address] [facility] [place], Verlean Primes, DO    Office Visit    2 weeks ago PMB (postmenopausal bleeding)    [facility], [address], [address] [place], Verlean Primes, DO    Office Visit    1 month ago Screening for cervical cancer    Maude Alvarado MD    Office Visit    1 month ago Primary osteoarthritis of right knee    [address], [address] [facility],[address], Segundo Mccormick MD    Office Visit    3 months ago Other fatigue    Maude Alvarado MD    Office Visit          Future Appointments         Provider Department Appt Notes    In 1 week Walter Haley MD [address], [address] [facility] [place] Follow up    In 1 week [facility] OF THE [address]     In 3 months Walter Haley MD [address], [address] [facility],[address] [facility] [place] Weight management               Passed - EGFRCR or GFRNAA > 50     GFR Evaluation  EGFRCR: 64 , resulted on 8/16/2023            fluconazole (DIFLUCAN) 150 MG Oral Tab 2 tablet 0     Sig: Take 1 tablet (150 mg total) by mouth every other day. There is no refill protocol information for this order       tiZANidine 4 MG Oral Tab 40 tablet 0     Sig: Take 0.5 tablets (2 mg total) by mouth every 8 (eight) hours as needed. There is no refill protocol information for this order       triamcinolone 0.1 % External Ointment 45 g 0     Sig: Apply 1 g topically 2 (two) times daily.  As needed to effected area (rash) There is no refill protocol information for this order       terbinafine 250 MG Oral Tab 7 tablet 0     Sig: Take 1 tablet (250 mg total) by mouth daily.        There is no refill protocol information for this order        Future Appointments         Provider Department Appt Notes    In 1 week Konstantin Kerns MD [address], [address], [place] Follow up    In 1 week [facility] OF [address]     In 3 months Konstantin Kerns MD [address], [address], [facility] management          Recent Outpatient Visits              1 week ago PMB (postmenopausal bleeding)    [facility], [address], [address] [facility]    Office Visit    2 weeks ago PMB (postmenopausal bleeding)    [facility], [address], [address] Matilde Hancock, DO    Office Visit    1 month ago Screening for cervical cancer    Casey Mccrary MD    Office Visit    1 month ago Primary osteoarthritis of right knee    [address] [facility] [place] Meenu Carrillo MD    Office Visit    3 months ago Other fatigue    Casey Mccrary MD    Office Visit

## 2023-11-15 NOTE — TELEPHONE ENCOUNTER
Please review. Protocol failed / Has no protocol. Requested Prescriptions   Pending Prescriptions Disp Refills    fluconazole (DIFLUCAN) 150 MG Oral Tab 2 tablet 0     Sig: Take 1 tablet (150 mg total) by mouth every other day. There is no refill protocol information for this order       tiZANidine 4 MG Oral Tab 40 tablet 0     Sig: Take 0.5 tablets (2 mg total) by mouth every 8 (eight) hours as needed. There is no refill protocol information for this order       triamcinolone 0.1 % External Ointment 45 g 0     Sig: Apply 1 g topically 2 (two) times daily. As needed to effected area (rash)       There is no refill protocol information for this order       terbinafine 250 MG Oral Tab 7 tablet 0     Sig: Take 1 tablet (250 mg total) by mouth daily. There is no refill protocol information for this order      Signed Prescriptions Disp Refills    buPROPion  MG Oral Tablet 24 Hr 90 tablet 2     Sig: Take 1 tablet (150 mg total) by mouth daily.        Psychiatric Non-Scheduled (Anti-Anxiety) Passed - 11/14/2023 11:57 AM        Passed - In person appointment or virtual visit in the past 6 mos or appointment in next 3 mos     Recent Outpatient Visits              1 week ago PMB (postmenopausal bleeding)    [facility], [address] [facility],[address], [address] [facility],     Office Visit    2 weeks ago PMB (postmenopausal bleeding)    [facility], [address] [facility], [facility],     Office Visit    1 month ago Screening for cervical cancer    Dilip Schneider MD    Office Visit    1 month ago Primary osteoarthritis of right knee    [address] [place] [address], Yecenia Medrano MD    Office Visit    3 months ago Other fatigue    Dilip Schneider MD    Office Visit          Future Appointments Provider Department Appt Notes    In 1 week Mark Noriega MD [address], [address], [place] Follow up    In 1 week [facility] OF [address] Health     In 3 months Mark Noriega MD [facility], [address], [place] Weight management                 chlorthalidone 25 MG Oral Tab 90 tablet 1     Sig: Take 1 tablet (25 mg total) by mouth daily.  For high blood pressure or edema       Hypertensive Medications Protocol Passed - 11/14/2023 11:57 AM        Passed - In person appointment in the past 12 or next 3 months     Recent Outpatient Visits              1 week ago PMB (postmenopausal bleeding)    [facility], [address], [address] [place], Remus Boswell, DO    Office Visit    2 weeks ago PMB (postmenopausal bleeding)    [facility], [address], [address], Remus Boswell, DO    Office Visit    1 month ago Screening for cervical cancer    Liv Esposito MD    Office Visit    1 month ago Primary osteoarthritis of right knee    [address], [address], Jacinto Bansal MD    Office Visit    3 months ago Other fatigue    Liv Esposito MD    Office Visit          Future Appointments         Provider Department Appt Notes    In 1 week Mark Noriega MD [address], [address], [place] Follow up    In 1 week [facility] OF THE [address]     In 3 months Mark Noriega MD [address], [address], [facility] management               Passed - Last BP reading less than 140/90     BP Readings from Last 1 Encounters:   11/06/23 116/80               Passed - CMP or BMP in past 6 months     Recent Results (from the past 4392 hour(s))   Comp Metabolic Panel (14)    Collection Time: 08/16/23 11:45 AM   Result Value Ref Range    Glucose 106 (H) 70 - 99 mg/dL    Sodium 141 136 - 145 mmol/L    Potassium 4.1 3.5 - 5.1 mmol/L    Chloride 109 98 - 112 mmol/L    CO2 25.0 21.0 - 32.0 mmol/L    Anion Gap 7 0 - 18 mmol/L    BUN 15 7 - 18 mg/dL    Creatinine 1.04 (H) 0.55 - 1.02 mg/dL    BUN/CREA Ratio 14.4 10.0 - 20.0    Calcium, Total 9.0 8.5 - 10.1 mg/dL    Calculated Osmolality 293 275 - 295 mOsm/kg    eGFR-Cr 64 >=60 mL/min/1.73m2    ALT 17 13 - 56 U/L    AST 12 (L) 15 - 37 U/L    Alkaline Phosphatase 68 41 - 108 U/L    Bilirubin, Total 1.0 0.1 - 2.0 mg/dL    Total Protein 8.0 6.4 - 8.2 g/dL    Albumin 3.6 3.4 - 5.0 g/dL    Globulin  4.4 2.8 - 4.4 g/dL    A/G Ratio 0.8 (L) 1.0 - 2.0    Patient Fasting for CMP? Yes      *Note: Due to a large number of results and/or encounters for the requested time period, some results have not been displayed. A complete set of results can be found in Results Review.                Passed - In person appointment or virtual visit in the past 6 months     Recent Outpatient Visits              1 week ago PMB (postmenopausal bleeding)    [facility], [address] [facility],[address], [address] [facility] Wyatt Reynolds, DO    Office Visit    2 weeks ago PMB (postmenopausal bleeding)    [facility], [address] [facility],[address], [address] [facility] [address],     Office Visit    1 month ago Screening for cervical cancer    Inez Hung MD    Office Visit    1 month ago Primary osteoarthritis of right knee    [address] [facility], Dagoberto Watkins MD    Office Visit    3 months ago Other fatigue    Ana Quiñonez, Lauren Buckenr MD    Office Visit          Future Appointments         Provider Department Appt Notes    In 1 week Syed Collier MD [address] [facility],[address], [address], [place] Follow up    In 1 week [facility] OF THE MAGALI Perla [address] [facility]     In 3 months Freya Baptiste MD [address] Weight management               Passed - [facility] or GFRNAA > 50     GFR Evaluation  EGFRCR: 64 , resulted on 8/16/2023             Future Appointments         Provider Department Appt Notes    In 1 week Freya Baptiste MD [address], [address], [place] Follow up    In 1 week [facility] OF [facility] [address] [place] [address]     In 3 months Freya Baptiste MD [address], [address], [place] [facility] management           Recent Outpatient Visits              1 week ago PMB (postmenopausal bleeding)    [facility], [address], [address] [facility]    Office Visit    2 weeks ago PMB (postmenopausal bleeding)    [facility], [address], [address] [facility],     Office Visit    1 month ago Screening for cervical cancer    Mimi Ochoa MD    Office Visit    1 month ago Primary osteoarthritis of right knee    [address] [facility] Roddy Celis MD    Office Visit    3 months ago Other fatigue    Mimi Ochoa MD    Office Visit

## 2023-11-17 NOTE — TELEPHONE ENCOUNTER
Any sent to let us know if she wants to move forward with surgery on Wed,12/13
Attempted to call pt 3 times first 2 times was unable to LVM due to mailbox not being set up, 3rd attempt someone answered asking me to delete number    Mychart sent to pt to call us, to phone room please ask pt if number on file is correct    Surgery booked for: Wed,12/13 at 1pm to hold slot.
OB GYN SURGICAL SCHEDULING    Diagnosis: PMB    Operative Procedure:  D&C, HSC, Truclear curettage    Side: n/a    Date: okay while on call    Admission:  Day Surgery    Anesthesia:  General     Bowel Prep:  No    Cytotec (400 mcg night prior):   Yes    Rep needed: yes    IPA / [employer] tubals or hyst:  Consent signed   not indicated    Additional Orders:  Routine Orders    Comments / Orders to Nurse: n/a    Discussed possible complications including but not limited to:  bleeding, infection, injury, uterine perforation, inability to dilate cx
Possible dates sent via [employer]. Aware is to choose 3 top dates and send them via [employer] or can call the office with them.  Advised pt once I have her 3 top dates I will have to coordinate availability with the OR as well and I will call her once I have a date and time for surgery
Pt aware surgery has been scheduled for Wed,12/13 at 1pm with [address]    Per [facility] No PA Needed    Minor case instructions sent via [employer]    Delayed staff message sent to MD to please place pre-op orders    Entered in book and calendar
24

## 2023-11-21 RX ORDER — CELECOXIB 200 MG/1
200 CAPSULE ORAL DAILY
Qty: 30 CAPSULE | Refills: 0 | OUTPATIENT
Start: 2023-11-21

## 2023-11-22 RX ORDER — CELECOXIB 200 MG/1
200 CAPSULE ORAL DAILY
Qty: 30 CAPSULE | Refills: 0 | Status: SHIPPED | OUTPATIENT
Start: 2023-11-22

## 2023-11-22 NOTE — TELEPHONE ENCOUNTER
Rx request for Celecoxib. Please review and sign off if appropriate. Thank you. Last seen: 9/25/23  Last refill: 10/19/23 # 30 with 0 refills.

## 2023-11-24 NOTE — TELEPHONE ENCOUNTER
Please review refill failed/no protocol     Requested Prescriptions     Pending Prescriptions Disp Refills    zolpidem 10 MG Oral Tab 30 tablet 0     Sig: Take 1 tablet (10 mg total) by mouth nightly. Recent Visits  Date Type Provider Dept   10/03/23 Office Visit Dany Rice, MD [facility]   08/16/23 Office Visit Dany Rice, MD [facility]   07/06/23 Office Visit Dany Rice, MD [facility]   04/13/23 Office Visit Dany Rice, MD [facility]   10/19/22 Office Visit Dany Rice, MD [facility]   07/05/22 Office Visit Dany Rice, MD [facility]   Showing recent visits within past 540 days with a meds authorizing provider and meeting all other requirements  Future Appointments  No visits were found meeting these conditions. Showing future appointments within next 150 days with a meds authorizing provider and meeting all other requirements    Requested Prescriptions   Pending Prescriptions Disp Refills    zolpidem 10 MG Oral Tab 30 tablet 0     Sig: Take 1 tablet (10 mg total) by mouth nightly.        There is no refill protocol information for this order

## 2023-11-25 RX ORDER — ZOLPIDEM TARTRATE 10 MG/1
10 TABLET ORAL NIGHTLY
Qty: 30 TABLET | Refills: 5 | Status: SHIPPED | OUTPATIENT
Start: 2023-11-25 | End: 2024-11-19

## 2023-12-08 ENCOUNTER — TELEPHONE (OUTPATIENT)
Dept: OBGYN CLINIC | Facility: CLINIC | Age: 53
End: 2023-12-08

## 2023-12-11 NOTE — TELEPHONE ENCOUNTER
Called PAT back and spoke to who stated Jessy who stated they tried calling pt in number in chart and was verbally abusive.    She stated that they also sent pt mychart message and stated that pt advised the number in chart is wrong and we are to call [phone] and that she need to cancel surgery    Called pt and number provided by PAT and LMTCB

## 2023-12-11 NOTE — TELEPHONE ENCOUNTER
Per pt wants to cancel surgery    Surgery change request sent    Updated book and calendar    To phoneroom to please update number on pts chart to one listed below

## 2023-12-26 RX ORDER — TOPIRAMATE 25 MG/1
25 TABLET ORAL 2 TIMES DAILY
Qty: 120 TABLET | Refills: 0 | OUTPATIENT
Start: 2023-12-26

## 2023-12-27 RX ORDER — CELECOXIB 200 MG/1
200 CAPSULE ORAL DAILY
Qty: 30 CAPSULE | Refills: 0 | Status: SHIPPED | OUTPATIENT
Start: 2023-12-27

## 2023-12-28 ENCOUNTER — TELEPHONE (OUTPATIENT)
Dept: FAMILY MEDICINE CLINIC | Facility: CLINIC | Age: 53
End: 2023-12-28

## 2023-12-28 DIAGNOSIS — R53.83 OTHER FATIGUE: Primary | ICD-10-CM

## 2023-12-28 NOTE — TELEPHONE ENCOUNTER
Chart reviewed and last vitamin B12 in chart is too high. To hold on doing injections and may want to recheck B12 level as per Dr Jason Nicole.

## 2024-01-08 ENCOUNTER — OFFICE VISIT (OUTPATIENT)
Dept: ORTHOPEDICS CLINIC | Facility: CLINIC | Age: 54
End: 2024-01-08
Payer: MEDICAID

## 2024-01-08 VITALS
WEIGHT: 265 LBS | BODY MASS INDEX: 45.24 KG/M2 | SYSTOLIC BLOOD PRESSURE: 124 MMHG | HEART RATE: 89 BPM | HEIGHT: 64 IN | DIASTOLIC BLOOD PRESSURE: 81 MMHG

## 2024-01-08 DIAGNOSIS — M17.11 PRIMARY OSTEOARTHRITIS OF RIGHT KNEE: Primary | ICD-10-CM

## 2024-01-08 RX ORDER — TRIAMCINOLONE ACETONIDE 40 MG/ML
40 INJECTION, SUSPENSION INTRA-ARTICULAR; INTRAMUSCULAR ONCE
Status: COMPLETED | OUTPATIENT
Start: 2024-01-08 | End: 2024-01-08

## 2024-01-08 RX ADMIN — TRIAMCINOLONE ACETONIDE 40 MG: 40 INJECTION, SUSPENSION INTRA-ARTICULAR; INTRAMUSCULAR at 11:55:00

## 2024-01-08 NOTE — PROGRESS NOTES
Per verbal order from Dr. Britt, draw up and 4ml of 0.5% Marcaine and 1ml of Kenalog 40 for injection into rightknee. Lis BAKER CMA  Patient provided education handout for cortisone injection.

## 2024-01-08 NOTE — PROGRESS NOTES
NURSING INTAKE COMMENTS:   Chief Complaint   Patient presents with    Knee Pain     Right f/u - had cortisone inj on 23 - pain restarted about 2 mo after the injection - no injury since LOV - rates pain as 10/10 at all the time - has swelling in the knee       HPI: This 53 year old female presents today with complaints of pain.  She has had reasonable response to cortisone injections previously.    Past Medical History:   Diagnosis Date    DJD (degenerative joint disease) of knee     HTN (hypertension), benign     Morbid obesity with BMI of 40.0-44.9, adult ([facility])      Past Surgical History:   Procedure Laterality Date          KNEE REPLACEMENT SURGERY      TONSILLECTOMY       Current Outpatient Medications   Medication Sig Dispense Refill    celecoxib 200 MG Oral Cap Take 1 capsule (200 mg total) by mouth daily. 30 capsule 0    zolpidem 10 MG Oral Tab Take 1 tablet (10 mg total) by mouth nightly. 30 tablet 5    buPROPion  MG Oral Tablet 24 Hr Take 1 tablet (150 mg total) by mouth daily. 90 tablet 2    chlorthalidone 25 MG Oral Tab Take 1 tablet (25 mg total) by mouth daily. For high blood pressure or edema 90 tablet 1    fluconazole (DIFLUCAN) 150 MG Oral Tab Take 1 tablet (150 mg total) by mouth every other day. 2 tablet 0    tiZANidine 4 MG Oral Tab Take 0.5 tablets (2 mg total) by mouth every 8 (eight) hours as needed. 40 tablet 0    triamcinolone 0.1 % External Ointment Apply 1 g topically 2 (two) times daily. As needed to effected area (rash) 45 g 0    terbinafine 250 MG Oral Tab Take 1 tablet (250 mg total) by mouth daily. 7 tablet 0    miSOPROStol (CYTOTEC) 200 MCG Oral Tab Take 1 tablet the night before your endometrial biopsy 1 tablet 0    Phentermine HCl 15 MG Oral Cap Take 1 capsule (15 mg total) by mouth every morning. 30 capsule 2    topiramate 25 MG Oral Tab Take 1 tablet (25 mg total) by mouth 2 (two) times daily. 120 tablet 0    montelukast (SINGULAIR) 10 MG Oral Tab Take 1 tablet  (10 mg total) by mouth daily. 90 tablet 3    escitalopram 20 MG Oral Tab Take 1 tablet (20 mg total) by mouth daily. 90 tablet 3    metoprolol succinate ER (TOPROL XL) 50 MG Oral Tablet 24 Hr Take 1 tablet (50 mg total) by mouth daily. 90 tablet 3     No Known Allergies  History reviewed. No pertinent family history.    Social History     Occupational History    Not on file   Tobacco Use    Smoking status: Never    Smokeless tobacco: Never   Vaping Use    Vaping Use: Never used   Substance and Sexual Activity    Alcohol use: Yes     Comment: socially    Drug use: No    Sexual activity: Not on file        Review of Systems:  GENERAL: feels generally well, no significant weight loss or weight gain  SKIN: no ulcerated or worrisome skin lesions  EYES:denies blurred vision or double vision  HEENT: denies new nasal congestion, sinus pain or ST  LUNGS: denies shortness of breath  CARDIOVASCULAR: denies chest pain  GI: no hematemesis, no worsening heartburn, no diarrhea  : no dysuria, no blood in urine, no difficulty urinating, no incontinence  MUSCULOSKELETAL: no other musculoskeletal complaints other than in HPI  NEURO: no numbness or tingling, no weakness or balance disorder  PSYCHE: no depression or anxiety  HEMATOLOGIC: no hx of blood dyscrasia  ENDOCRINE: no thyroid or diabetes issues  ALL/ASTHMA: no new hx of severe allergy or asthma    Physical Examination:    /81   Pulse 89   Ht 5' 4\" (1.626 m)   Wt 265 lb (120.2 kg)   BMI 45.49 kg/m²   Constitutional: appears well hydrated, alert and responsive, no acute distress noted  Extremities: Pain with range of motion of the right knee.      Imaging: No results found.     Lab Results   Component Value Date    WBC 8.7 08/16/2023    HGB 15.3 08/16/2023    .0 08/16/2023      Lab Results   Component Value Date     (H) 08/16/2023    BUN 15 08/16/2023    CREATSERUM 1.04 (H) 08/16/2023    GFRNAA 65 07/05/2022    GFRAA 75 07/05/2022        Assessment and  Plan:  Diagnoses and all orders for this visit:    Primary osteoarthritis of right knee  -     arthrocentesis major joint  -     triamcinolone acetonide (Kenalog-40) 40 MG/ML injection 40 mg        Assessment: Osteoarthritis of the right knee and morbid obesity.    Procedure: The risks and benefits of a cortisone injection were discussed with the patient.  An informational sheet was also provided and the patient had ample time to review it.  Under sterile preparation, the right knee was injected with 40 mg of Kenalog and 4 cc 0.5% marcaine.  The patient tolerated the procedure well.      Plan: Continue with conservative care for right knee osteoarthritis.  Weight loss encouraged.    The above note was creating using Dragon speech recognition technology. Please excuse any typos.    QUIQUE DAVENPORT MD

## 2024-01-09 ENCOUNTER — OFFICE VISIT (OUTPATIENT)
Dept: FAMILY MEDICINE CLINIC | Facility: CLINIC | Age: 54
End: 2024-01-09
Payer: MEDICAID

## 2024-01-09 ENCOUNTER — OFFICE VISIT (OUTPATIENT)
Dept: SURGERY | Facility: CLINIC | Age: 54
End: 2024-01-09
Payer: MEDICAID

## 2024-01-09 VITALS
WEIGHT: 264.31 LBS | HEIGHT: 65.1 IN | BODY MASS INDEX: 44.04 KG/M2 | SYSTOLIC BLOOD PRESSURE: 140 MMHG | HEART RATE: 71 BPM | DIASTOLIC BLOOD PRESSURE: 86 MMHG | OXYGEN SATURATION: 96 %

## 2024-01-09 VITALS
BODY MASS INDEX: 44.9 KG/M2 | DIASTOLIC BLOOD PRESSURE: 86 MMHG | WEIGHT: 263 LBS | OXYGEN SATURATION: 98 % | HEIGHT: 64 IN | SYSTOLIC BLOOD PRESSURE: 154 MMHG | RESPIRATION RATE: 18 BRPM | HEART RATE: 55 BPM

## 2024-01-09 DIAGNOSIS — E66.01 MORBID OBESITY WITH BMI OF 40.0-44.9, ADULT (HCC): ICD-10-CM

## 2024-01-09 DIAGNOSIS — F43.9 STRESS: ICD-10-CM

## 2024-01-09 DIAGNOSIS — E88.819 INSULIN RESISTANCE: ICD-10-CM

## 2024-01-09 DIAGNOSIS — M17.0 PRIMARY OSTEOARTHRITIS OF BOTH KNEES: ICD-10-CM

## 2024-01-09 DIAGNOSIS — R53.83 OTHER FATIGUE: Primary | ICD-10-CM

## 2024-01-09 DIAGNOSIS — I10 HTN (HYPERTENSION), BENIGN: Primary | ICD-10-CM

## 2024-01-09 PROCEDURE — 3079F DIAST BP 80-89 MM HG: CPT | Performed by: FAMILY MEDICINE

## 2024-01-09 PROCEDURE — 3077F SYST BP >= 140 MM HG: CPT | Performed by: FAMILY MEDICINE

## 2024-01-09 PROCEDURE — 3008F BODY MASS INDEX DOCD: CPT | Performed by: FAMILY MEDICINE

## 2024-01-09 PROCEDURE — 99213 OFFICE O/P EST LOW 20 MIN: CPT | Performed by: FAMILY MEDICINE

## 2024-01-09 PROCEDURE — 96372 THER/PROPH/DIAG INJ SC/IM: CPT | Performed by: FAMILY MEDICINE

## 2024-01-09 PROCEDURE — 99214 OFFICE O/P EST MOD 30 MIN: CPT | Performed by: INTERNAL MEDICINE

## 2024-01-09 RX ORDER — TOPIRAMATE 25 MG/1
25 TABLET ORAL 2 TIMES DAILY
Qty: 120 TABLET | Refills: 1 | Status: SHIPPED | OUTPATIENT
Start: 2024-01-09

## 2024-01-09 RX ORDER — CYANOCOBALAMIN 1000 UG/ML
1000 INJECTION, SOLUTION INTRAMUSCULAR; SUBCUTANEOUS ONCE
Status: COMPLETED | OUTPATIENT
Start: 2024-01-09 | End: 2024-01-09

## 2024-01-09 RX ORDER — PHENTERMINE HYDROCHLORIDE 15 MG/1
15 CAPSULE ORAL EVERY MORNING
Qty: 30 CAPSULE | Refills: 5 | Status: SHIPPED | OUTPATIENT
Start: 2024-01-09

## 2024-01-09 RX ADMIN — CYANOCOBALAMIN 1000 MCG: 1000 INJECTION, SOLUTION INTRAMUSCULAR; SUBCUTANEOUS at 13:30:00

## 2024-01-09 NOTE — PROGRESS NOTES
[facility], [facility], [place]  [address]  [facility] [address]  Dept: [phone]       Patient:  Eileen Christianson  :      1970  MRN:      [id number]    Chief Complaint:    Chief Complaint   Patient presents with    Follow - Up    Weight Management     Weight check        SUBJECTIVE     History of Present Illness:  Eileen is being seen today for a follow-up for non surgical weight loss    Past Medical History:   Past Medical History:   Diagnosis Date    DJD (degenerative joint disease) of knee     HTN (hypertension), benign     Morbid obesity with BMI of 40.0-44.9, adult ([facility])         Comorbidities:  Back pain-Improvement?  yes, Joint pain-Improvement?  yes, Hypertension-Improvement?  yes, DARIUS-Improvement?  yes and Snoring-Improvement?  yes    OBJECTIVE     Vitals: BP (!) 166/91 (BP Location: Left arm, Patient Position: Sitting, Cuff Size: large)   Pulse 71   Ht 5' 5.1\" (1.654 m)   Wt 264 lb 4.8 oz (119.9 kg)   SpO2 96%   BMI 43.85 kg/m²     Initial weight loss: -12   Total weight loss: -05   Start weight: 269    Wt Readings from Last 3 Encounters:   24 264 lb 4.8 oz (119.9 kg)   24 263 lb (119.3 kg)   24 265 lb (120.2 kg)       Patient Medications:    Current Outpatient Medications   Medication Sig Dispense Refill    celecoxib 200 MG Oral Cap Take 1 capsule (200 mg total) by mouth daily. 30 capsule 0    zolpidem 10 MG Oral Tab Take 1 tablet (10 mg total) by mouth nightly. 30 tablet 5    buPROPion  MG Oral Tablet 24 Hr Take 1 tablet (150 mg total) by mouth daily. 90 tablet 2    chlorthalidone 25 MG Oral Tab Take 1 tablet (25 mg total) by mouth daily. For high blood pressure or edema 90 tablet 1    fluconazole (DIFLUCAN) 150 MG Oral Tab Take 1 tablet (150 mg total) by mouth every other day. 2 tablet 0    tiZANidine 4 MG Oral Tab Take 0.5 tablets (2 mg total) by mouth every 8 (eight) hours as needed. 40 tablet 0    triamcinolone  0.1 % External Ointment Apply 1 g topically 2 (two) times daily. As needed to effected area (rash) 45 g 0    terbinafine 250 MG Oral Tab Take 1 tablet (250 mg total) by mouth daily. 7 tablet 0    miSOPROStol (CYTOTEC) 200 MCG Oral Tab Take 1 tablet the night before your endometrial biopsy 1 tablet 0    Phentermine HCl 15 MG Oral Cap Take 1 capsule (15 mg total) by mouth every morning. 30 capsule 2    topiramate 25 MG Oral Tab Take 1 tablet (25 mg total) by mouth 2 (two) times daily. 120 tablet 0    montelukast (SINGULAIR) 10 MG Oral Tab Take 1 tablet (10 mg total) by mouth daily. 90 tablet 3    escitalopram 20 MG Oral Tab Take 1 tablet (20 mg total) by mouth daily. 90 tablet 3    metoprolol succinate ER (TOPROL XL) 50 MG Oral Tablet 24 Hr Take 1 tablet (50 mg total) by mouth daily. 90 tablet 3     Allergies:  Patient has no known allergies.     Social History:    Social History     Socioeconomic History    Marital status: Single     Spouse name: Not on file    Number of children: Not on file    Years of education: Not on file    Highest education level: Not on file   Occupational History    Not on file   Tobacco Use    Smoking status: Never    Smokeless tobacco: Never   Vaping Use    Vaping Use: Never used   Substance and Sexual Activity    Alcohol use: Yes     Comment: socially    Drug use: No    Sexual activity: Not on file   Other Topics Concern    Not on file   Social History Narrative    Not on file     Social Determinants of Health     Financial Resource Strain: Not on file   Food Insecurity: Not on file   Transportation Needs: Not on file   Physical Activity: Not on file   Stress: Not on file   Social Connections: Not on file   Housing Stability: Not on file     Surgical History:    Past Surgical History:   Procedure Laterality Date          KNEE REPLACEMENT SURGERY      TONSILLECTOMY       Family History:  No family history on file.    Food Journal  Reviewed and Discussed:       Patient has a Food  Journal?: no   Patient is reading nutrition labels?    Average Caloric Intake:     Average CHO Intake: 160  Is patient exercising? no  Type of exercise? Limited due to pain    Eating Habits  Patient states the following:  Eats 1 meal(s) per day  Length of time it takes to consume a meal:  20  # of snacks per day: 1 Type of snacks:  Bagel dogs, ch nuggets   Amount of soda consumption per day:  Regular 2 liter daily  Amount of water (in ounces) per day:  48  Drinking between meals only:  yes  Toughest challenge:  Exercise     Nutritional Goals  Limit carbohydrates to 100 gms per day, Eat 100-200 calories within 1 hour of waking  and Eat 3-4 cups of fresh fruits or vegetables daily    Behavior Modifications Reviewed and Discussed  Eat breakfast, Eat 3 meals per day, Plan meals in advance, Read nutrition labels, Drink 64 oz of water per day, Maintain a daily food journal, No drinking 30 minutes before or after meals, Utlize portion control strategies to reduce calorie intake, Identify triggers for eating and manage cues and Eat slowly and take 20 to 30 minutes to complete each meal    Exercise Goals Reviewed and Discussed    Increase as tolerated    ROS:    Constitutional: positive for fatigue  Respiratory: positive for dyspnea on exertion  Cardiovascular: positive for chest pain  Gastrointestinal: negative  Musculoskeletal:positive for arthralgias and back pain  Neurological: positive for headaches  Behavioral/Psych: positive for anxiety and depression  Endocrine: negative  All other systems were reviewed and are negative    Physical Exam:   General appearance: alert, appears stated age, cooperative and moderately obese  Head: Normocephalic, without obvious abnormality, atraumatic  Back: symmetric, no curvature. ROM normal. No CVA tenderness.  Lungs: clear to auscultation bilaterally  Heart: S1, S2 normal, no murmur, click, rub or gallop, regular rate and rhythm  Abdomen:  soft, obese, non tneder  Extremities:  extremities normal, atraumatic, no cyanosis or edema  Pulses: 2+ and symmetric  Skin: Skin color, texture, turgor normal. No rashes or lesions  Neurologic: Grossly normal    ASSESSMENT     HYPERTENSION:  The patient's blood pressure has been well controlled.  she has been checking it as instructed and has remained in relatively good control.      Encounter Diagnosis(ses):   Encounter Diagnoses   Name Primary?    HTN (hypertension), benign Yes    Stress     Primary osteoarthritis of both knees     Insulin resistance     Morbid obesity with BMI of 40.0-44.9, adult ([facility])        PLAN     Patient is not interested in bariatric surgery. Patient desires to pursue traditional weight loss at this time.      HYPERTENSION: Blood pressure stable on the above medications. No interval change in antihypertensive medication.     DEGENERATIVE JOINT DISEASE: Of the knees is stable. Encourage upper body exercises if unable to perform weight-bearing exercises.      Insulin resistance: may improve with diet and exercise    Goals for next month:  1. Keep a food log.  2. Drink 48-64 ounces of non-caloric beverages per day. No fruit juices or regular soda.  3. Increase activity-upper body exercises, walk 10 minutes per day.  4. Increase fruit and vegetable servings to 5-6 per day.      Needs to cut out soda    Tolerating Topiramate: helps makes soda taste funny    Stress: not controlled  Recommend she takes her Lexapro (not taking)    Tolerating Phentermine  Refill at current dose  ekg done    Needs bp rechecked. Was with pcp earlier      Diagnoses and all orders for this visit:    HTN (hypertension), benign    Stress    Primary osteoarthritis of both knees    Insulin resistance    Morbid obesity with BMI of 40.0-44.9, adult ([facility])          Lucas Alba MD

## 2024-01-09 NOTE — PROGRESS NOTES
Subjective:   Patient ID: Eileen Christianson is a 53 year old female.    HPI  Patient here for f/u depression   States that is doing much better   She is also asking for another B12 injection states that it helps her a lot   She states that does not have time for blood test today but will do it another time   History/Other:   Review of Systems    Constitutional: Negative.  Negative for activity change, appetite change, diaphoresis and fatigue.     Respiratory: Negative.  Negative for apnea, cough, chest tightness and shortness of breath.    Cardiovascular: Negative.  Negative for chest pain, palpitations and leg swelling.   Gastrointestinal: Negative.  Negative for abdominal pain.            Psychiatric/Behavioral: see hpi     Current Outpatient Medications   Medication Sig Dispense Refill    celecoxib 200 MG Oral Cap Take 1 capsule (200 mg total) by mouth daily. 30 capsule 0    zolpidem 10 MG Oral Tab Take 1 tablet (10 mg total) by mouth nightly. 30 tablet 5    buPROPion  MG Oral Tablet 24 Hr Take 1 tablet (150 mg total) by mouth daily. 90 tablet 2    chlorthalidone 25 MG Oral Tab Take 1 tablet (25 mg total) by mouth daily. For high blood pressure or edema 90 tablet 1    fluconazole (DIFLUCAN) 150 MG Oral Tab Take 1 tablet (150 mg total) by mouth every other day. 2 tablet 0    tiZANidine 4 MG Oral Tab Take 0.5 tablets (2 mg total) by mouth every 8 (eight) hours as needed. 40 tablet 0    triamcinolone 0.1 % External Ointment Apply 1 g topically 2 (two) times daily. As needed to effected area (rash) 45 g 0    terbinafine 250 MG Oral Tab Take 1 tablet (250 mg total) by mouth daily. 7 tablet 0    miSOPROStol (CYTOTEC) 200 MCG Oral Tab Take 1 tablet the night before your endometrial biopsy 1 tablet 0    Phentermine HCl 15 MG Oral Cap Take 1 capsule (15 mg total) by mouth every morning. 30 capsule 2    topiramate 25 MG Oral Tab Take 1 tablet (25 mg total) by mouth 2 (two) times daily. 120 tablet 0    montelukast  (SINGULAIR) 10 MG Oral Tab Take 1 tablet (10 mg total) by mouth daily. 90 tablet 3    escitalopram 20 MG Oral Tab Take 1 tablet (20 mg total) by mouth daily. 90 tablet 3    metoprolol succinate ER (TOPROL XL) 50 MG Oral Tablet 24 Hr Take 1 tablet (50 mg total) by mouth daily. 90 tablet 3     Allergies:No Known Allergies    Objective:   Physical Exam  Constitutional:       Appearance: She is obese.   Cardiovascular:      Rate and Rhythm: Normal rate and regular rhythm.      Pulses: Normal pulses.      Heart sounds: Normal heart sounds.   Pulmonary:      Effort: Pulmonary effort is normal.      Breath sounds: Normal breath sounds.   Neurological:      Mental Status: She is alert.         Assessment & Plan:   1. Other fatigue    Depression -better cpm   F/u in 3 months     No orders of the defined types were placed in this encounter.      Meds This Visit:  Requested Prescriptions      No prescriptions requested or ordered in this encounter       Imaging & Referrals:  None

## 2024-01-31 RX ORDER — CELECOXIB 200 MG/1
200 CAPSULE ORAL DAILY
Qty: 30 CAPSULE | Refills: 0 | Status: SHIPPED | OUTPATIENT
Start: 2024-01-31

## 2024-02-04 ENCOUNTER — PATIENT MESSAGE (OUTPATIENT)
Dept: FAMILY MEDICINE CLINIC | Facility: CLINIC | Age: 54
End: 2024-02-04

## 2024-02-04 RX ORDER — MISOPROSTOL 200 UG/1
TABLET ORAL
Qty: 1 TABLET | Refills: 0 | Status: CANCELLED | OUTPATIENT
Start: 2024-02-04

## 2024-02-05 NOTE — TELEPHONE ENCOUNTER
From: Eileen Christianson  To: Eve Cain  Sent: 2/4/2024 11:55 PM CST  Subject: Amoxicillin     I just saw the doctor a couple of weeks ago, I have a sinus infection and ear ache and I just need some antibiotics.  Thank you,   Eileen Christianson

## 2024-02-05 NOTE — TELEPHONE ENCOUNTER
Mychart message sent to patient instructing to call nurse triage to speak directly to a nurse regarding symptoms as per department protocol.      Future Appointments   Date Time Provider Department Center   2/28/2024  2:20 PM [facility]   5/2/2024  9:30 AM [id number]

## 2024-02-06 RX ORDER — TERBINAFINE HYDROCHLORIDE 250 MG/1
250 TABLET ORAL DAILY
Qty: 7 TABLET | Refills: 0 | Status: SHIPPED | OUTPATIENT
Start: 2024-02-06

## 2024-02-06 RX ORDER — FLUCONAZOLE 150 MG/1
150 TABLET ORAL EVERY OTHER DAY
Qty: 2 TABLET | Refills: 0 | Status: SHIPPED | OUTPATIENT
Start: 2024-02-06

## 2024-02-06 RX ORDER — BUPROPION HYDROCHLORIDE 150 MG/1
150 TABLET ORAL DAILY
Qty: 90 TABLET | Refills: 2 | OUTPATIENT
Start: 2024-02-06

## 2024-02-06 RX ORDER — TIZANIDINE 4 MG/1
2 TABLET ORAL EVERY 8 HOURS PRN
Qty: 40 TABLET | Refills: 0 | Status: SHIPPED | OUTPATIENT
Start: 2024-02-06

## 2024-02-06 RX ORDER — CHLORTHALIDONE 25 MG/1
25 TABLET ORAL DAILY
Qty: 90 TABLET | Refills: 1 | Status: SHIPPED | OUTPATIENT
Start: 2024-02-06

## 2024-02-06 RX ORDER — TRIAMCINOLONE ACETONIDE 1 MG/G
1 OINTMENT TOPICAL 2 TIMES DAILY
Qty: 45 G | Refills: 0 | Status: SHIPPED | OUTPATIENT
Start: 2024-02-06

## 2024-02-06 RX ORDER — METOPROLOL SUCCINATE 50 MG/1
50 TABLET, EXTENDED RELEASE ORAL DAILY
Qty: 90 TABLET | Refills: 3 | OUTPATIENT
Start: 2024-02-06 | End: 2025-01-31

## 2024-02-06 RX ORDER — ESCITALOPRAM OXALATE 20 MG/1
20 TABLET ORAL DAILY
Qty: 90 TABLET | Refills: 3 | OUTPATIENT
Start: 2024-02-06

## 2024-02-06 NOTE — TELEPHONE ENCOUNTER
Please review; protocol failed/ has no protocol    Requested Prescriptions   Pending Prescriptions Disp Refills    chlorthalidone 25 MG Oral Tab 90 tablet 1     Sig: Take 1 tablet (25 mg total) by mouth daily. For high blood pressure or edema       Hypertension Medications Protocol Failed - 2/6/2024  9:59 AM        Failed - Last BP reading less than 140/90     BP Readings from Last 1 Encounters:   01/09/24 140/86               Passed - CMP or BMP in past 12 months        Passed - In person appointment or virtual visit in the past 12 mos or appointment in next 3 mos     Recent Outpatient Visits              4 weeks ago Other fatigue    [facility] Eve Cain MD    Office Visit    4 weeks ago HTN (hypertension), benign    [facility] Lucas Alba MD    Office Visit    4 weeks ago Primary osteoarthritis of right knee    [facility] Checo Britt MD    Office Visit    3 months ago PMB (postmenopausal bleeding)    [facility] - OB/GYN Raphael Klein DO    Office Visit    3 months ago PMB (postmenopausal bleeding)    [facility] - OB/Raphael Seth DO    Office Visit          Future Appointments         Provider Department Appt Notes    In 3 weeks [address] [facility]     In 2 months Lucas Alba MD [facility] Update               Passed - EGFRCR or GFRNAA > 50     GFR Evaluation  EGFRCR: 64 , resulted on 8/16/2023            fluconazole (DIFLUCAN) 150 MG Oral Tab 2 tablet 0     Sig: Take 1 tablet (150 mg total) by mouth every other day.       There is no refill protocol information for this order       tiZANidine 4 MG Oral Tab 40 tablet 0     Sig: Take 0.5 tablets (2 mg total) by mouth every 8 (eight)  hours as needed.       There is no refill protocol information for this order       triamcinolone 0.1 % External Ointment 45 g 0     Sig: Apply 1 g topically 2 (two) times daily. As needed to effected area (rash)       There is no refill protocol information for this order       terbinafine 250 MG Oral Tab 7 tablet 0     Sig: Take 1 tablet (250 mg total) by mouth daily.       There is no refill protocol information for this order      Refused Prescriptions Disp Refills    metoprolol succinate ER (TOPROL XL) 50 MG Oral Tablet 24 Hr 90 tablet 3     Sig: Take 1 tablet (50 mg total) by mouth daily.       Hypertension Medications Protocol Failed - 2/6/2024  9:59 AM        Failed - Last BP reading less than 140/90     BP Readings from Last 1 Encounters:   01/09/24 140/86               Passed - CMP or BMP in past 12 months        Passed - In person appointment or virtual visit in the past 12 mos or appointment in next 3 mos     Recent Outpatient Visits              4 weeks ago Other fatigue    [facility] Eve Cain MD    Office Visit    4 weeks ago HTN (hypertension), benign    [facility] Lucas Alba MD    Office Visit    4 weeks ago Primary osteoarthritis of right knee    [facility] Checo Britt MD    Office Visit    3 months ago PMB (postmenopausal bleeding)    [facility] - OB/GYN Raphael Klein DO    Office Visit    3 months ago PMB (postmenopausal bleeding)    [facility] - OB/GYN Raphael Klein DO    Office Visit          Future Appointments         Provider Department Appt Notes    In 3 weeks [facility]     In 2 months Lucas Alba MD [facility] Update               Passed - EGFRCR or  GFRNAA > 50     GFR Evaluation  EGFRCR: 64 , resulted on 8/16/2023            escitalopram 20 MG Oral Tab 90 tablet 3     Sig: Take 1 tablet (20 mg total) by mouth daily.       Psychiatric Non-Scheduled (Anti-Anxiety) Passed - 2/6/2024  9:59 AM        Passed - In person appointment or virtual visit in the past 6 mos or appointment in next 3 mos     Recent Outpatient Visits              4 weeks ago Other fatigue    [facility] YeaddissEve Roach MD    Office Visit    4 weeks ago HTN (hypertension), benign    [facility] Lucas Alba MD    Office Visit    4 weeks ago Primary osteoarthritis of right knee    [facility] Checo Britt MD    Office Visit    3 months ago PMB (postmenopausal bleeding)    [facility] - OB/GYN Raphael Klein DO    Office Visit    3 months ago PMB (postmenopausal bleeding)    [facility] OB/Raphael Seth,     Office Visit          Future Appointments         Provider Department Appt Notes    In 3 weeks [address] [facility]     In 2 months Lucas Alba MD [facility] Update               Passed - Depression Screening completed within the past 12 months          buPROPion  MG Oral Tablet 24 Hr 90 tablet 2     Sig: Take 1 tablet (150 mg total) by mouth daily.       Psychiatric Non-Scheduled (Anti-Anxiety) Passed - 2/6/2024  9:59 AM        Passed - In person appointment or virtual visit in the past 6 mos or appointment in next 3 mos     Recent Outpatient Visits              [facility]    [facility]    4 weeks ago HTN (hypertension), [facility],  Lucas Gloria MD    Office Visit    4 weeks ago Primary osteoarthritis of right knee    [facility]    3 months ago PMB (postmenopausal bleeding)    [facility] Raphael Klein, DO    Office Visit    3 months ago PMB (postmenopausal bleeding)    [facility]aphael Seth, DO    Office Visit          Future Appointments         Provider Department Appt Notes    In 3 weeks [address]     In 2 months [facility]               Passed - Depression Screening completed within the past 12 months           Recent Outpatient Visits              4 weeks ago Other fatigue    [facility]    Office Visit    4 weeks ago HTN (hypertension), benign    [facility]    Office Visit    4 weeks ago Primary osteoarthritis of right knee    [facility]    [facility]    3 months ago PMB (postmenopausal bleeding)    [facility]aphael Seth,     Office Visit    3 months ago PMB (postmenopausal bleeding)    [facility]aphael Seth, DO    Office Visit          Future Appointments         Provider Department Appt Notes    In 3 weeks [address]     In 2 months Lucas Alba MD [facility] [place] Update

## 2024-02-09 DIAGNOSIS — E66.01 MORBID OBESITY WITH BMI OF 40.0-44.9, ADULT (HCC): Primary | ICD-10-CM

## 2024-02-09 RX ORDER — TIRZEPATIDE 2.5 MG/.5ML
2.5 INJECTION, SOLUTION SUBCUTANEOUS WEEKLY
Qty: 3 ML | Refills: 2 | Status: SHIPPED | OUTPATIENT
Start: 2024-02-09

## 2024-02-09 NOTE — TELEPHONE ENCOUNTER
Amoxicillin  Message [id number]  From  Viviana Herrera RN To  Eileen Christianson Sent and Delivered  2/5/2024 11:54 AM   Last Read in MyChart  2/5/2024  2:12 PM by Eileen Christianson

## 2024-03-05 RX ORDER — CELECOXIB 200 MG/1
200 CAPSULE ORAL DAILY
Qty: 30 CAPSULE | Refills: 0 | Status: SHIPPED | OUTPATIENT
Start: 2024-03-05

## 2024-03-27 ENCOUNTER — LAB ENCOUNTER (OUTPATIENT)
Dept: LAB | Age: 54
End: 2024-03-27
Attending: FAMILY MEDICINE
Payer: MEDICAID

## 2024-03-27 ENCOUNTER — OFFICE VISIT (OUTPATIENT)
Dept: FAMILY MEDICINE CLINIC | Facility: CLINIC | Age: 54
End: 2024-03-27
Payer: MEDICAID

## 2024-03-27 VITALS
HEART RATE: 67 BPM | OXYGEN SATURATION: 97 % | WEIGHT: 266.38 LBS | BODY MASS INDEX: 44.38 KG/M2 | HEIGHT: 65 IN | SYSTOLIC BLOOD PRESSURE: 129 MMHG | RESPIRATION RATE: 16 BRPM | DIASTOLIC BLOOD PRESSURE: 85 MMHG

## 2024-03-27 DIAGNOSIS — F32.89 OTHER DEPRESSION: ICD-10-CM

## 2024-03-27 DIAGNOSIS — R53.83 OTHER FATIGUE: Primary | ICD-10-CM

## 2024-03-27 DIAGNOSIS — M54.59 OTHER LOW BACK PAIN: ICD-10-CM

## 2024-03-27 LAB — VIT B12 SERPL-MCNC: 554 PG/ML (ref 211–911)

## 2024-03-27 PROCEDURE — 36415 COLL VENOUS BLD VENIPUNCTURE: CPT | Performed by: FAMILY MEDICINE

## 2024-03-27 PROCEDURE — 96372 THER/PROPH/DIAG INJ SC/IM: CPT | Performed by: FAMILY MEDICINE

## 2024-03-27 PROCEDURE — 99214 OFFICE O/P EST MOD 30 MIN: CPT | Performed by: FAMILY MEDICINE

## 2024-03-27 PROCEDURE — 82607 VITAMIN B-12: CPT | Performed by: FAMILY MEDICINE

## 2024-03-27 RX ORDER — CYANOCOBALAMIN 1000 UG/ML
1000 INJECTION, SOLUTION INTRAMUSCULAR; SUBCUTANEOUS ONCE
Status: COMPLETED | OUTPATIENT
Start: 2024-03-27 | End: 2024-03-27

## 2024-03-27 RX ORDER — FOLIC ACID 5 MG
1 CAPSULE ORAL DAILY
Qty: 90 CAPSULE | Refills: 3 | Status: SHIPPED | OUTPATIENT
Start: 2024-03-27 | End: 2024-06-25

## 2024-03-27 RX ADMIN — CYANOCOBALAMIN 1000 MCG: 1000 INJECTION, SOLUTION INTRAMUSCULAR; SUBCUTANEOUS at 10:53:00

## 2024-03-27 NOTE — PROGRESS NOTES
Subjective:   Patient ID: Eileen Christianson is a 53 year old female.    Here for f/u 'feels still very tired and also complaining about back pain         History/Other:   Review of Systems   Constitutional: Negative.  Negative for fatigue.   Respiratory:  Negative for cough, chest tightness and shortness of breath.    Cardiovascular: Negative.    Musculoskeletal:  Positive for back pain and gait problem. Negative for joint swelling and myalgias.   Neurological:  Negative for weakness and numbness.         Current Outpatient Medications   Medication Sig Dispense Refill    Folic Acid 5 MG Oral Cap Take 1 capsule by mouth daily. 90 capsule 3    celecoxib 200 MG Oral Cap Take 1 capsule (200 mg total) by mouth daily. 30 capsule 0    Tirzepatide-Weight Management (ZEPBOUND) 2.5 MG/0.5ML Subcutaneous Solution Auto-injector Inject 2.5 mg into the skin once a week. 3 mL 2    chlorthalidone 25 MG Oral Tab Take 1 tablet (25 mg total) by mouth daily. For high blood pressure or edema 90 tablet 1    fluconazole (DIFLUCAN) 150 MG Oral Tab Take 1 tablet (150 mg total) by mouth every other day. 2 tablet 0    tiZANidine 4 MG Oral Tab Take 0.5 tablets (2 mg total) by mouth every 8 (eight) hours as needed. 40 tablet 0    triamcinolone 0.1 % External Ointment Apply 1 g topically 2 (two) times daily. As needed to effected area (rash) 45 g 0    terbinafine 250 MG Oral Tab Take 1 tablet (250 mg total) by mouth daily. 7 tablet 0    Phentermine HCl 15 MG Oral Cap Take 1 capsule (15 mg total) by mouth every morning. 30 capsule 5    topiramate 25 MG Oral Tab Take 1 tablet (25 mg total) by mouth 2 (two) times daily. 120 tablet 1    zolpidem 10 MG Oral Tab Take 1 tablet (10 mg total) by mouth nightly. 30 tablet 5    buPROPion  MG Oral Tablet 24 Hr Take 1 tablet (150 mg total) by mouth daily. 90 tablet 2    miSOPROStol (CYTOTEC) 200 MCG Oral Tab Take 1 tablet the night before your endometrial biopsy 1 tablet 0    montelukast (SINGULAIR) 10 MG  Oral Tab Take 1 tablet (10 mg total) by mouth daily. 90 tablet 3    escitalopram 20 MG Oral Tab Take 1 tablet (20 mg total) by mouth daily. 90 tablet 3    metoprolol succinate ER (TOPROL XL) 50 MG Oral Tablet 24 Hr Take 1 tablet (50 mg total) by mouth daily. 90 tablet 3     Allergies:No Known Allergies    Objective:   Physical Exam  Constitutional:       Appearance: She is well-developed.   Cardiovascular:      Rate and Rhythm: Normal rate and regular rhythm.      Heart sounds: Normal heart sounds.   Pulmonary:      Effort: Pulmonary effort is normal. No respiratory distress.      Breath sounds: Normal breath sounds. No wheezing or rales.   Abdominal:      General: There is no distension.      Palpations: Abdomen is soft.      Tenderness: There is no abdominal tenderness.   Musculoskeletal:         General: Tenderness present. No deformity.      Lumbar back: Spasms and tenderness present. Decreased range of motion.   Neurological:      Mental Status: She is alert and oriented to person, place, and time.      Motor: No abnormal muscle tone.      Coordination: Coordination normal.      Deep Tendon Reflexes: Reflexes are normal and symmetric. Reflexes normal.         Assessment & Plan:   1. Other fatigue    2. Other low back pain    Start physical therapy   Depression cpm   Add folic acid    No orders of the defined types were placed in this encounter.      Meds This Visit:  Requested Prescriptions     Signed Prescriptions Disp Refills    Folic Acid 5 MG Oral Cap 90 capsule 3     Sig: Take 1 capsule by mouth daily.       Imaging & Referrals:  PHYSICAL THERAPY EXTERNAL

## 2024-04-03 ENCOUNTER — PATIENT MESSAGE (OUTPATIENT)
Dept: FAMILY MEDICINE CLINIC | Facility: CLINIC | Age: 54
End: 2024-04-03

## 2024-04-05 RX ORDER — TOPIRAMATE 25 MG/1
25 TABLET ORAL 2 TIMES DAILY
Qty: 120 TABLET | Refills: 0 | Status: SHIPPED | OUTPATIENT
Start: 2024-04-05

## 2024-04-17 ENCOUNTER — OFFICE VISIT (OUTPATIENT)
Dept: ORTHOPEDICS CLINIC | Facility: CLINIC | Age: 54
End: 2024-04-17
Payer: MEDICAID

## 2024-04-17 ENCOUNTER — HOSPITAL ENCOUNTER (OUTPATIENT)
Dept: GENERAL RADIOLOGY | Facility: HOSPITAL | Age: 54
Discharge: HOME | End: 2024-04-17
Attending: ORTHOPAEDIC SURGERY
Payer: MEDICAID

## 2024-04-17 VITALS — HEIGHT: 64 IN | BODY MASS INDEX: 44.39 KG/M2 | WEIGHT: 260 LBS

## 2024-04-17 DIAGNOSIS — R52 PAIN: ICD-10-CM

## 2024-04-17 DIAGNOSIS — R52 PAIN: Primary | ICD-10-CM

## 2024-04-17 DIAGNOSIS — M17.11 PRIMARY OSTEOARTHRITIS OF RIGHT KNEE: ICD-10-CM

## 2024-04-17 PROCEDURE — 99213 OFFICE O/P EST LOW 20 MIN: CPT | Performed by: ORTHOPAEDIC SURGERY

## 2024-04-17 PROCEDURE — 73562 X-RAY EXAM OF KNEE 3: CPT | Performed by: ORTHOPAEDIC SURGERY

## 2024-04-17 PROCEDURE — 20610 DRAIN/INJ JOINT/BURSA W/O US: CPT | Performed by: ORTHOPAEDIC SURGERY

## 2024-04-17 RX ORDER — TRIAMCINOLONE ACETONIDE 40 MG/ML
40 INJECTION, SUSPENSION INTRA-ARTICULAR; INTRAMUSCULAR ONCE
Status: COMPLETED | OUTPATIENT
Start: 2024-04-17 | End: 2024-04-17

## 2024-04-17 RX ADMIN — TRIAMCINOLONE ACETONIDE 40 MG: 40 INJECTION, SUSPENSION INTRA-ARTICULAR; INTRAMUSCULAR at 17:28:00

## 2024-04-17 NOTE — PROGRESS NOTES
Per verbal order from Dr. Britt, draw up and 4ml of 0.5% Marcaine and 1ml of Kenalog 40 for injection into right knee. Conchis SHAIKH MA  Patient provided education handout for cortisone injection.

## 2024-04-17 NOTE — PROGRESS NOTES
NURSING INTAKE COMMENTS:   Chief Complaint   Patient presents with    Knee Pain     R knee f/u - Had cortisone injection  with 3.5 months of relief. Rates pain 10/10  Requesting injection.       HPI: This 53 year old female presents today with complaints of right knee pain following cortisone injection.    Past Medical History:    DJD (degenerative joint disease) of knee    HTN (hypertension), benign    Morbid obesity with BMI of 40.0-44.9, adult (HCC)     Past Surgical History:   Procedure Laterality Date          Knee replacement surgery      Tonsillectomy       Current Outpatient Medications   Medication Sig Dispense Refill    TOPIRAMATE 25 MG Oral Tab TAKE ONE TABLET BY MOUTH TWICE DAILY 120 tablet 0    Folic Acid 5 MG Oral Cap Take 1 capsule by mouth daily. 90 capsule 3    celecoxib 200 MG Oral Cap Take 1 capsule (200 mg total) by mouth daily. 30 capsule 0    Tirzepatide-Weight Management (ZEPBOUND) 2.5 MG/0.5ML Subcutaneous Solution Auto-injector Inject 2.5 mg into the skin once a week. 3 mL 2    chlorthalidone 25 MG Oral Tab Take 1 tablet (25 mg total) by mouth daily. For high blood pressure or edema 90 tablet 1    fluconazole (DIFLUCAN) 150 MG Oral Tab Take 1 tablet (150 mg total) by mouth every other day. 2 tablet 0    tiZANidine 4 MG Oral Tab Take 0.5 tablets (2 mg total) by mouth every 8 (eight) hours as needed. 40 tablet 0    triamcinolone 0.1 % External Ointment Apply 1 g topically 2 (two) times daily. As needed to effected area (rash) 45 g 0    terbinafine 250 MG Oral Tab Take 1 tablet (250 mg total) by mouth daily. 7 tablet 0    Phentermine HCl 15 MG Oral Cap Take 1 capsule (15 mg total) by mouth every morning. 30 capsule 5    zolpidem 10 MG Oral Tab Take 1 tablet (10 mg total) by mouth nightly. 30 tablet 5    buPROPion  MG Oral Tablet 24 Hr Take 1 tablet (150 mg total) by mouth daily. 90 tablet 2    miSOPROStol (CYTOTEC) 200 MCG Oral Tab Take 1 tablet the night before your  endometrial biopsy 1 tablet 0    montelukast (SINGULAIR) 10 MG Oral Tab Take 1 tablet (10 mg total) by mouth daily. 90 tablet 3    escitalopram 20 MG Oral Tab Take 1 tablet (20 mg total) by mouth daily. 90 tablet 3    metoprolol succinate ER (TOPROL XL) 50 MG Oral Tablet 24 Hr Take 1 tablet (50 mg total) by mouth daily. 90 tablet 3     No Known Allergies  No family history on file.    Social History     Occupational History    Not on file   Tobacco Use    Smoking status: Never    Smokeless tobacco: Never   Vaping Use    Vaping status: Never Used   Substance and Sexual Activity    Alcohol use: Yes     Comment: socially    Drug use: No    Sexual activity: Not on file        Review of Systems:  GENERAL: feels generally well, no significant weight loss or weight gain  SKIN: no ulcerated or worrisome skin lesions  EYES:denies blurred vision or double vision  HEENT: denies new nasal congestion, sinus pain or ST  LUNGS: denies shortness of breath  CARDIOVASCULAR: denies chest pain  GI: no hematemesis, no worsening heartburn, no diarrhea  : no dysuria, no blood in urine, no difficulty urinating, no incontinence  MUSCULOSKELETAL: no other musculoskeletal complaints other than in HPI  NEURO: no numbness or tingling, no weakness or balance disorder  PSYCHE: no depression or anxiety  HEMATOLOGIC: no hx of blood dyscrasia  ENDOCRINE: no thyroid or diabetes issues  ALL/ASTHMA: no new hx of severe allergy or asthma    Physical Examination:    Ht 5' 4\" (1.626 m)   Wt 260 lb (117.9 kg)   BMI 44.63 kg/m²   Constitutional: appears well hydrated, alert and responsive, no acute distress noted  Extremities: Pain with range of motion of the right knee.      Imaging: No results found.     Lab Results   Component Value Date    WBC 8.7 08/16/2023    HGB 15.3 08/16/2023    .0 08/16/2023      Lab Results   Component Value Date     (H) 08/16/2023    BUN 15 08/16/2023    CREATSERUM 1.04 (H) 08/16/2023    GFRNAA 65 07/05/2022     GFRAA 75 07/05/2022        Assessment and Plan:  Diagnoses and all orders for this visit:    Pain  -     XR KNEE (3 VIEWS), RIGHT (CPT=73562); Future    Primary osteoarthritis of right knee  -     arthrocentesis major joint  -     triamcinolone acetonide (Kenalog-40) 40 MG/ML injection 40 mg        Assessment: Right knee osteoarthritis.    Procedure: The risks and benefits of a cortisone injection were discussed with the patient.  An informational sheet was also provided and the patient had ample time to review it.  Under sterile preparation, the right knee was injected with 40 mg of Kenalog and 4 cc 0.5% marcaine.  The patient tolerated the procedure well.      Plan: Follow-up as needed    The above note was creating using Dragon speech recognition technology. Please excuse any typos.    QUIQUE DAVENPORT MD

## 2024-05-02 ENCOUNTER — OFFICE VISIT (OUTPATIENT)
Dept: INTERNAL MEDICINE CLINIC | Facility: CLINIC | Age: 54
End: 2024-05-02

## 2024-05-02 ENCOUNTER — NURSE TRIAGE (OUTPATIENT)
Dept: FAMILY MEDICINE CLINIC | Facility: CLINIC | Age: 54
End: 2024-05-02

## 2024-05-02 VITALS
RESPIRATION RATE: 16 BRPM | DIASTOLIC BLOOD PRESSURE: 91 MMHG | HEART RATE: 59 BPM | SYSTOLIC BLOOD PRESSURE: 145 MMHG | BODY MASS INDEX: 45.24 KG/M2 | HEIGHT: 64 IN | WEIGHT: 265 LBS

## 2024-05-02 DIAGNOSIS — N93.9 VAGINAL BLEEDING: Primary | ICD-10-CM

## 2024-05-02 PROCEDURE — 99203 OFFICE O/P NEW LOW 30 MIN: CPT | Performed by: NURSE PRACTITIONER

## 2024-05-02 NOTE — TELEPHONE ENCOUNTER
Action Requested: Summary for Provider     []  Critical Lab, Recommendations Needed  [] Need Additional Advice  []   FYI    []   Need Orders  [] Need Medications Sent to Pharmacy  []  Other     SUMMARY: appt made, patient stated she have not had a period in 2 years, started having vaginal bleeding yesterday, unable to see Gyne Dr until June, cramping -taking ibuprofen, no clots seen      Reason for call: Vaginal Bleeding  Onset: yesterday                   Reason for Disposition   Patient wants to be seen    Protocols used: Vaginal Bleeding - [id number]

## 2024-05-02 NOTE — PROGRESS NOTES
Eileen Christianson is a 53 year old female.  Chief Complaint   Patient presents with    Vaginal Problem     bleeding     HPI:   She presents with vaginal bleeding. She states the bleeding started yesterday. She is having lower abdominal pain and lower back pain. She currently rates the pain a 10/10.     She is using paper towels instead of pads because she did not have any. She has gone through 2 rolls of paper towels due to the bleeding.     She has not had a period in 2 years. Her PCP completed her PAP in October 2023 and it was normal. She had bleeding after her PAP smear and was referred to OB.     She did see OB in November for post menopausal and was suppose to have an endometrial biopsy completed.     Current Outpatient Medications   Medication Sig Dispense Refill    TOPIRAMATE 25 MG Oral Tab TAKE ONE TABLET BY MOUTH TWICE DAILY 120 tablet 0    Folic Acid 5 MG Oral Cap Take 1 capsule by mouth daily. 90 capsule 3    celecoxib 200 MG Oral Cap Take 1 capsule (200 mg total) by mouth daily. 30 capsule 0    tiZANidine 4 MG Oral Tab Take 0.5 tablets (2 mg total) by mouth every 8 (eight) hours as needed. 40 tablet 0    terbinafine 250 MG Oral Tab Take 1 tablet (250 mg total) by mouth daily. 7 tablet 0    Phentermine HCl 15 MG Oral Cap Take 1 capsule (15 mg total) by mouth every morning. 30 capsule 5    zolpidem 10 MG Oral Tab Take 1 tablet (10 mg total) by mouth nightly. 30 tablet 5    buPROPion  MG Oral Tablet 24 Hr Take 1 tablet (150 mg total) by mouth daily. 90 tablet 2    montelukast (SINGULAIR) 10 MG Oral Tab Take 1 tablet (10 mg total) by mouth daily. 90 tablet 3    escitalopram 20 MG Oral Tab Take 1 tablet (20 mg total) by mouth daily. 90 tablet 3    metoprolol succinate ER (TOPROL XL) 50 MG Oral Tablet 24 Hr Take 1 tablet (50 mg total) by mouth daily. 90 tablet 3    Tirzepatide-Weight Management (ZEPBOUND) 2.5 MG/0.5ML Subcutaneous Solution Auto-injector Inject 2.5 mg into the skin once a week. (Patient not  taking: Reported on 2024) 3 mL 2    chlorthalidone 25 MG Oral Tab Take 1 tablet (25 mg total) by mouth daily. For high blood pressure or edema (Patient not taking: Reported on 2024) 90 tablet 1    fluconazole (DIFLUCAN) 150 MG Oral Tab Take 1 tablet (150 mg total) by mouth every other day. (Patient not taking: Reported on 2024) 2 tablet 0    triamcinolone 0.1 % External Ointment Apply 1 g topically 2 (two) times daily. As needed to effected area (rash) (Patient not taking: Reported on 2024) 45 g 0    miSOPROStol (CYTOTEC) 200 MCG Oral Tab Take 1 tablet the night before your endometrial biopsy (Patient not taking: Reported on 2024) 1 tablet 0      Past Medical History:    DJD (degenerative joint disease) of knee    HTN (hypertension), benign    Morbid obesity with BMI of 40.0-44.9, adult (HCC)      Past Surgical History:   Procedure Laterality Date          Knee replacement surgery      Tonsillectomy        Social History:  Social History     Socioeconomic History    Marital status: Single   Tobacco Use    Smoking status: Never    Smokeless tobacco: Never   Vaping Use    Vaping status: Never Used   Substance and Sexual Activity    Alcohol use: Yes     Comment: socially    Drug use: No      No family history on file.   No Known Allergies     REVIEW OF SYSTEMS:     Review of Systems   Constitutional:  Negative for fever.   HENT: Negative.     Respiratory:  Negative for cough, shortness of breath and wheezing.    Cardiovascular:  Negative for chest pain.   Gastrointestinal:  Positive for abdominal pain.   Genitourinary:  Positive for vaginal bleeding.   Musculoskeletal:  Positive for back pain.   Skin: Negative.    Neurological: Negative.    Psychiatric/Behavioral: Negative.        Wt Readings from Last 5 Encounters:   24 265 lb (120.2 kg)   24 260 lb (117.9 kg)   24 266 lb 6.4 oz (120.8 kg)   24 264 lb 4.8 oz (119.9 kg)   24 263 lb (119.3 kg)     Body mass  index is 45.49 kg/m².      EXAM:   BP (!) 145/91   Pulse 59   Resp 16   Ht 5' 4\" (1.626 m)   Wt 265 lb (120.2 kg)   BMI 45.49 kg/m²     Physical Exam  Vitals reviewed.   Constitutional:       Appearance: Normal appearance.   HENT:      Head: Normocephalic.   Eyes:      Extraocular Movements: Extraocular movements intact.      Pupils: Pupils are equal, round, and reactive to light.   Cardiovascular:      Rate and Rhythm: Normal rate and regular rhythm.      Pulses: Normal pulses.   Pulmonary:      Breath sounds: Normal breath sounds. No wheezing.   Abdominal:      General: Bowel sounds are normal.      Palpations: Abdomen is soft.   Musculoskeletal:         General: No swelling. Normal range of motion.      Cervical back: Normal range of motion and neck supple.   Skin:     General: Skin is warm and dry.   Neurological:      Mental Status: She is alert and oriented to person, place, and time.   Psychiatric:         Mood and Affect: Mood normal.         Behavior: Behavior normal.            ASSESSMENT AND PLAN:   1. Vaginal bleeding  - patient advised to go to the ER due to uncontrolled pain and excessive vaginal bleeding.   - patient verbalized understanding. She is going to have someone drive her to the ER.       The patient indicates understanding of these issues and agrees to the plan.  Return for go to the ER for further evaluation .

## 2024-06-12 ENCOUNTER — NURSE TRIAGE (OUTPATIENT)
Dept: FAMILY MEDICINE CLINIC | Facility: CLINIC | Age: 54
End: 2024-06-12

## 2024-06-12 DIAGNOSIS — G47.00 INSOMNIA, UNSPECIFIED TYPE: Primary | ICD-10-CM

## 2024-06-12 NOTE — TELEPHONE ENCOUNTER
Patient scheduled an appointment via [facility] for the following concern:    I am dizzy all the time. I have extreme headaches and nausea and diarrhea.

## 2024-06-13 NOTE — TELEPHONE ENCOUNTER
Action Requested: Summary for Provider     []  Critical Lab, Recommendations Needed  [] Need Additional Advice  [x]   FYI    []   Need Orders  [] Need Medications Sent to Pharmacy  []  Other     SUMMARY: Patient calling stating she has been experiencing dizziness, heartburn and diarrhea. While asking about dizziness and chest pain patient states that it feels like \"an elephant is sitting my chest.\" This RN immediately advised patient to go to the ER now. Confirmed that she does have someone to drive her.  *will be going to  [facility] in [place]    Reason for call: Acute  Onset: 3 days    Patient has been Drinking 1/2 gallon of water a day     Has had 6 bouts diarrhea in past 24/hrs    Feels like room is spinning-can start when laying down; comes and goes-lasts about 5 seconds    Denies fever      Reason for Disposition   Chest pain lasting longer than 5 minutes and pain is crushing, pressure-like, or heavy    Protocols used: Chest Pain-A-[place]

## 2024-06-14 ENCOUNTER — TELEPHONE (OUTPATIENT)
Dept: SURGERY | Facility: CLINIC | Age: 54
End: 2024-06-14

## 2024-06-14 DIAGNOSIS — G47.00 INSOMNIA, UNSPECIFIED TYPE: Primary | ICD-10-CM

## 2024-06-14 DIAGNOSIS — R53.83 OTHER FATIGUE: ICD-10-CM

## 2024-06-14 RX ORDER — MISOPROSTOL 200 UG/1
TABLET ORAL
Qty: 1 TABLET | Refills: 0 | OUTPATIENT
Start: 2024-06-14

## 2024-06-14 NOTE — TELEPHONE ENCOUNTER
No emergency room visits noted in [facility] or care everywhere.  Follow up call placed to patient.  States she was seen and treated with IV fluids for dehydration.  She is feling better today.  No further action.

## 2024-06-15 NOTE — TELEPHONE ENCOUNTER
Protocol Failed/ No Protocol    Requested Prescriptions   Pending Prescriptions Disp Refills    ZOLPIDEM 10 MG Oral Tab [Pharmacy Med Name: Zolpidem Tartrate 10 Mg Tab Nort] 30 tablet 5     Sig: Take 1 tablet (10 mg total) by mouth nightly.       Controlled Substance Medication Failed - 6/12/2024  3:39 PM        Failed - This medication is a controlled substance - forward to provider to refill             Future Appointments         Provider Department Appt Notes    In 1 week [facility]one in right    In 1 week Eve Cain MD [facility] [place] Dizzy    In 2 weeks Lucas Alba MD [facility] Weight    In 2 months Lucas Alba MD [facility] [place] Weight    In 3 months Lucas Alba MD [facility] Review weight loss    In 5 months Lucas Alba MD [facility] [place] Weight    In 6 months [facility]    In 7 months Lucas Alba MD [facility] Weight          Recent Outpatient Visits              1 month ago Vaginal bleeding    [facility] Li West APRN    Office Visit    1 month ago Pain    [facility] Checo Britt MD    Office Visit    2 months ago Other fatigue    [facility]    [facility]    5 months ago Other fatigue    [facility]    Office Visit    5 months ago HTN (hypertension), benign    [facility] Lucas Alba MD    Office Visit

## 2024-06-17 ENCOUNTER — TELEPHONE (OUTPATIENT)
Dept: SURGERY | Facility: CLINIC | Age: 54
End: 2024-06-17

## 2024-06-17 RX ORDER — CELECOXIB 200 MG/1
200 CAPSULE ORAL DAILY
Qty: 30 CAPSULE | Refills: 0 | Status: SHIPPED | OUTPATIENT
Start: 2024-06-17

## 2024-06-17 RX ORDER — BUPROPION HYDROCHLORIDE 150 MG/1
150 TABLET ORAL DAILY
Qty: 90 TABLET | Refills: 3 | Status: SHIPPED | OUTPATIENT
Start: 2024-06-17

## 2024-06-17 RX ORDER — ESCITALOPRAM OXALATE 20 MG/1
20 TABLET ORAL DAILY
Qty: 90 TABLET | Refills: 3 | Status: SHIPPED | OUTPATIENT
Start: 2024-06-17

## 2024-06-17 NOTE — TELEPHONE ENCOUNTER
Refill passed per [facility] protocol.  Requested Prescriptions   Pending Prescriptions Disp Refills    escitalopram 20 MG Oral Tab 90 tablet 3     Sig: Take 1 tablet (20 mg total) by mouth daily.       Psychiatric Non-Scheduled (Anti-Anxiety) Passed - 6/14/2024 11:55 AM        Passed - In person appointment or virtual visit in the past 6 mos or appointment in next 3 mos     Recent Outpatient Visits              1 month ago Vaginal bleeding    [facility] Li West APRN    Office Visit    2 months ago Pain    [facility] Checo Britt MD    Office Visit    2 months ago [facility]    [facility]    5 months ago Other fatigue    [facility] Eve Newell MD    Office Visit    5 months ago HTN (hypertension), benign    [facility] Lucas Alba MD    Office Visit          Future Appointments         Provider Department Appt Notes    In 1 week [facility], cortisone in right    In 1 week Eve Cain MD [facility] Dizzy    In 2 weeks Lucas Alba MD [facility] Weight    In 2 months Lucas Alba MD [facility] Weight    In 3 months [facility]    In 5 months [facility]    In 6 months [facility]    In 7 months Lucas Alba MD [facility] Weight                    Passed - Depression Screening  completed within the past 12 months          montelukast (SINGULAIR) 10 MG Oral Tab 90 tablet 3     Sig: Take 1 tablet (10 mg total) by mouth daily.       Asthma & COPD Medication Protocol Failed - 6/14/2024 11:55 AM        Failed - Asthma Action Score greater than or equal to 20        Failed - AAP/ACT given in last 12 months     No data recorded  No data recorded  No data recorded  No data recorded          Passed - Appointment in past 6 or next 3 months      Recent Outpatient Visits              1 month ago [facility]inal [facility]    [facility]    2 months ago [facility]    [facility]    2 months ago Other fatigue    [facility]    Office Visit    5 months ago Other fatigue    [facility]    Office Visit    5 months ago HTN (hypertension), benign    [facility] Lucas Alba MD    Office Visit          Future Appointments         Provider Department Appt Notes    In 1 week [facility], cortisone in right    In 1 week [facility]    In 2 weeks [facility] Alba MD [facility] Weight    In 2 months [facility]    In 3 months [facility] weight loss    In 5 months [facility]    In 6 months [facility]    In 7 months Lucas Alba MD [facility]  [facility] [place] Weight                      buPROPion  MG Oral Tablet 24 Hr 90 tablet 2     Sig: Take 1 tablet (150 mg total) by mouth daily.       Psychiatric Non-Scheduled (Anti-Anxiety) Passed - 6/14/2024 11:55 AM        Passed - In person appointment or virtual visit in the past 6 mos or appointment in next 3 mos     Recent Outpatient Visits              1 month ago [facility]aginal bleeding    [facility]    Office Visit    2 months ago [facility]    [facility] Visit    2 months ago Other fatigue    [facility]    Office Visit    5 months ago Other fatigue    [facility] Eve Newell MD    Office Visit    5 months ago HTN (hypertension), benign    [facility] Lucas Alba MD    Office Visit          Future Appointments         Provider Department Appt Notes    In 1 week [facility], cortisone in right    In 1 week [facility]    In 2 weeks Lucas Alba MD [facility] Weight    In 2 months [facility]    In 3 months [facility] Review weight loss    In 5 months [facility]    In 6 months [facility]    In 7 months [facility]                    Passed - Depression Screening completed within the past 12 months           zolpidem 10 MG Oral Tab 30 tablet 5     Sig: Take 1 tablet (10 mg total) by mouth nightly.       Controlled Substance Medication Failed - 6/14/2024 11:55 AM        Failed - This medication is a controlled substance - forward to provider to refill          chlorthalidone 25 MG Oral Tab 90 tablet 1     Sig: Take 1 tablet (25 mg total) by mouth daily. For high blood pressure or edema       Hypertension Medications Protocol Failed - 6/14/2024 11:55 AM        Failed - Last BP reading less than 140/90     BP Readings from Last 1 Encounters:   05/02/24 (!) 145/91               Passed - CMP or BMP in past 12 months        Passed - In person appointment or virtual visit in the past 12 mos or appointment in next 3 mos     Recent Outpatient Visits              1 month ago [facility]aginal [facility]    Office [facility]    2 months ago [facility]    Office Visit    2 months ago Other fatigue    [facility]    Office Visit    5 months ago Other fatigue    [facility]    Office Visit    5 months ago HTN (hypertension), benign    [facility]ba MD    Office Visit          Future Appointments         Provider Department Appt Notes    In 1 week [facility], cortisone in right    In 1 week [facility]    In 2 weeks [facility]    In 2 months [facility] MD [facility]    In 3 months [facility] MD [facility]  Review weight loss    In 5 months Lucas Alba MD [facility] Weight    In 6 months Lucas Alba MD [facility] Weight    In 7 months Lucas Alba MD [facility] Weight                    Passed - EGFRCR or GFRNAA > 50     GFR Evaluation  EGFRCR: 64 , resulted on 8/16/2023            tiZANidine 4 MG Oral Tab 40 tablet 0     Sig: Take 0.5 tablets (2 mg total) by mouth every 8 (eight) hours as needed.       There is no refill protocol information for this order       triamcinolone 0.1 % External Ointment 45 g 0     Sig: Apply 1 g topically 2 (two) times daily. As needed to effected area (rash)       There is no refill protocol information for this order       terbinafine 250 MG Oral Tab 7 tablet 0     Sig: Take 1 tablet (250 mg total) by mouth daily.       There is no refill protocol information for this order       Folic Acid 5 MG Oral Cap 90 capsule 3     Sig: Take 1 capsule by mouth daily.       There is no refill protocol information for this order        Recent Outpatient Visits              1 month ago Vaginal bleeding    [facility] Li West APRN    Office Visit    2 months ago [facility]    [facility]    Office Visit    2 months ago Other fatigue    [facility]    Office Visit    5 months ago Other fatigue    [facility] Eve Newell MD    Office Visit    5 months ago HTN (hypertension), benign    [facility] Lucas Alba MD    Office Visit          Future Appointments         Provider Department Appt Notes    In 1 week Checo Britt MD [facility] Left knee, cortisone  in right    In 1 week Eve Cain MD [facility] [place] Dizzy    In 2 weeks Lucas Alba MD [facility] [place] Weight    In 2 months Lucas Alba MD [facility] [place] Weight    In 3 months Lucas Alba MD [facility] [place] Review weight loss    In 5 months Lucas Alba MD [facility] [place] Weight    In 6 months Lucas Alba MD [facility] [place] Weight    In 7 months Lucas Alba MD [facility] [place] Weight

## 2024-06-17 NOTE — TELEPHONE ENCOUNTER
Patient called on 6/13/24 @ 5:00 pm and wanted to see if she can get in sooner. Left voicemail for patient to call back the office to see if we have a sooner appt. HUGH

## 2024-06-17 NOTE — TELEPHONE ENCOUNTER
Please review; protocol failed/ has no protocol      Fill dates for Zolpidem    Recent fills: 05/05/2024,04/05/2024,03/04/2024  Last Rx written: 11/25/2023 with 5 refills   Last Office Visit : 03/27/2024    Recent Visits  Date Type Provider Dept   03/27/24 Office Visit Eve Cain MD [facility]     Future Appointments  Date Type Provider Dept   06/26/24 Appointment Eve Cain MD [facility]          Requested Prescriptions   Pending Prescriptions Disp Refills    montelukast (SINGULAIR) 10 MG Oral Tab 90 tablet 3     Sig: Take 1 tablet (10 mg total) by mouth daily.       Asthma & COPD Medication Protocol Failed - 6/14/2024 11:55 AM        Failed - Asthma Action Score greater than or equal to 20        Failed - AAP/ACT given in last 12 months     No data recorded  No data recorded  No data recorded  No data recorded          Passed - Appointment in past 6 or next 3 months      Recent Outpatient Visits              1 month ago Vaginal bleeding    [facility]    Office Visit    2 months ago [facility]    [facility]    Office Visit    2 months ago Other fatigue    [facility]    Office Visit    5 months ago Other fatigue    [facility] Newell MD    Office Visit    5 months ago HTN (hypertension), benign    [facility] MD    Office Visit          Future Appointments         Provider Department Appt Notes    In 1 week [facility], cortisone in right    In 1 week [facility]    In 2 weeks [facility] Al[facility] MD [facility] Weight     In 2 months Lucas Alba MD [facility] Weight    In 3 months Lucas Alba MD [facility] Review weight loss    In 5 months [facility]    In 6 months [facility]    In 7 months Lucas Alba MD [facility] Weight                      zolpidem 10 MG Oral Tab 30 tablet 5     Sig: Take 1 tablet (10 mg total) by mouth nightly.       Controlled Substance Medication Failed - 6/14/2024 11:55 AM        Failed - This medication is a controlled substance - forward to provider to refill          chlorthalidone 25 MG Oral Tab 90 tablet 1     Sig: Take 1 tablet (25 mg total) by mouth daily. For high blood pressure or edema       Hypertension Medications Protocol Failed - 6/14/2024 11:55 AM        Failed - Last BP reading less than 140/90     BP Readings from Last 1 Encounters:   05/02/24 (!) 145/91               Passed - CMP or BMP in past 12 months        Passed - In person appointment or virtual visit in the past 12 mos or appointment in next 3 mos     Recent Outpatient Visits              1 month ago [facility]aginal bleeding    [facility]    Office Visit    2 months ago [facility]    [facility]    Office Visit    2 months ago [facility] fatigue    [facility]    Office Visit    5 months ago Other fatigue    [facility]    Office Visit    5 months ago HTN (hypertension), benign    [facility] Lucas Alba MD    Office Visit          Future Appointments         Provider Department Appt  Notes    In 1 week Checo Britt MD [facility] [place] Left knee, cortisone in right    In 1 week Eve Cain MD [facility], [facility] [place] Dizzy    In 2 weeks Lucas Alba MD [facility] [place] Weight    In 2 months Lucas Alba MD [facility] [place] Weight    In 3 months Lucas Alba MD [facility] [place] Review weight loss    In 5 months Lucas Alba MD [facility] [place] Weight    In 6 months Lucas Alba MD [facility], [facility] Weight    In 7 months Lucas Alba MD [facility] [place] Weight                    Passed - EGFRCR or GFRNAA > 50     GFR Evaluation  EGFRCR: 64 , resulted on 8/16/2023            tiZANidine 4 MG Oral Tab 40 tablet 0     Sig: Take 0.5 tablets (2 mg total) by mouth every 8 (eight) hours as needed.       There is no refill protocol information for this order       triamcinolone 0.1 % External Ointment 45 g 0     Sig: Apply 1 g topically 2 (two) times daily. As needed to effected area (rash)       There is no refill protocol information for this order       terbinafine 250 MG Oral Tab 7 tablet 0     Sig: Take 1 tablet (250 mg total) by mouth daily.       There is no refill protocol information for this order       Folic Acid 5 MG Oral Cap 90 capsule 3     Sig: Take 1 capsule by mouth daily.       There is no refill protocol information for this order      Signed Prescriptions Disp Refills    escitalopram 20 MG Oral Tab 90 tablet 3     Sig: Take 1 tablet (20 mg total) by mouth daily.       Psychiatric Non-Scheduled (Anti-Anxiety) Passed - 6/14/2024 11:55 AM        Passed - In person appointment or virtual visit in the past 6 mos or appointment in next 3 mos     Recent Outpatient Visits              1 month ago  Vaginal bleeding    [facility]    Office Visit    2 months ago Pain    [facility] Checo Britt MD    Office Visit    2 months ago Other fatigue    [facility] [place] Eve Cain MD    Office Visit    5 months ago Other fatigue    [facility] [place] [facility]    Office Visit    5 months ago HTN (hypertension), benign    [facility] Lucas Alba MD    Office Visit          Future Appointments         Provider Department Appt Notes    In 1 week Checo Britt MD [facility] Left knee, cortisone in right    In 1 week Eve Cain MD [facility] [place] Dizzy    In 2 weeks Lucas Alba MD [facility] Weight    In 2 months Lucas Alba MD [facility] [place] Weight    In 3 months Lucas Alba MD [facility] Review weight loss    In 5 months [facility]    In 6 months [facility]    In 7 months Lucas Alba MD [facility] Weight                    Passed - Depression Screening completed within the past 12 months          buPROPion  MG Oral Tablet 24 Hr 90 tablet 3     Sig: Take 1 tablet (150 mg total) by mouth daily.       Psychiatric Non-Scheduled (Anti-Anxiety) Passed - 6/14/2024 11:55 AM        Passed - In person appointment or virtual visit in the past 6 mos or appointment in next 3 mos     Recent Outpatient Visits              1 month ago Vaginal bleeding    [facility] [place]    Office Visit    2 months ago Pain    [facility]    Office Visit    2 months ago Other fatigue    [facility] Eve Newell MD    Office Visit    5 months ago Other fatigue    [facility] Eve Newell MD    Office Visit    5 months ago HTN (hypertension), benign    [facility] [place] Lucas Alba MD    Office Visit          Future Appointments         Provider Department Appt Notes    In 1 week Checo Britt MD [facility] [place] Left knee, cortisone in right    In 1 week Eve Cain MD [facility] [place] Dizzy    In 2 weeks Lucas Alba MD [facility] [place] Weight    In 2 months Lucas Alba MD [facility] [place] Weight    In 3 months Lucas Alba MD [facility] [place] Review weight loss    In 5 months Lucas Alba MD [facility] [place] Weight    In 6 months Lucas Alba MD [facility] [place] Weight    In 7 months Lucas Alba MD [facility] [place] Weight                    Passed - Depression Screening completed within the past 12 months           Recent Outpatient Visits              1 month ago Vaginal bleeding    [facility] [place] [place] [facility]    Office Visit    2 months ago Pain    [facility] [place]    Office Visit    2 months ago Other fatigue    [facility] Eve Newell MD    Office Visit    5 months ago Other  fatigue    [facility] [place] Eve Cain MD    Office Visit    5 months ago HTN (hypertension), benign    [facility] [place] Lucas Alba MD    Office Visit          Future Appointments         Provider Department Appt Notes    In 1 week Checo Britt MD [facility] [place] Left knee, cortisone in right    In 1 week Eve Cain MD [facility] [place] Dizzy    In 2 weeks Lucas Alba MD [facility] [place] Weight    In 2 months [facility]    In 3 months Lucas Alba MD [facility] Review weight loss    In 5 months Lucas Alba MD [facility] [place] Weight    In 6 months [facility]    In 7 months [facility]

## 2024-06-18 RX ORDER — ZOLPIDEM TARTRATE 10 MG/1
10 TABLET ORAL NIGHTLY
Qty: 30 TABLET | Refills: 5 | Status: SHIPPED | OUTPATIENT
Start: 2024-06-18 | End: 2025-06-13

## 2024-06-18 RX ORDER — TRIAMCINOLONE ACETONIDE 1 MG/G
1 OINTMENT TOPICAL 2 TIMES DAILY
Qty: 45 G | Refills: 0 | Status: SHIPPED | OUTPATIENT
Start: 2024-06-18

## 2024-06-18 RX ORDER — CHLORTHALIDONE 25 MG/1
25 TABLET ORAL DAILY
Qty: 90 TABLET | Refills: 1 | Status: SHIPPED | OUTPATIENT
Start: 2024-06-18

## 2024-06-18 RX ORDER — TERBINAFINE HYDROCHLORIDE 250 MG/1
250 TABLET ORAL DAILY
Qty: 7 TABLET | Refills: 0 | Status: SHIPPED | OUTPATIENT
Start: 2024-06-18

## 2024-06-18 RX ORDER — MONTELUKAST SODIUM 10 MG/1
10 TABLET ORAL DAILY
Qty: 90 TABLET | Refills: 3 | Status: SHIPPED | OUTPATIENT
Start: 2024-06-18 | End: 2025-06-13

## 2024-06-18 RX ORDER — TIZANIDINE 4 MG/1
2 TABLET ORAL EVERY 8 HOURS PRN
Qty: 40 TABLET | Refills: 0 | Status: SHIPPED | OUTPATIENT
Start: 2024-06-18

## 2024-06-19 ENCOUNTER — TELEPHONE (OUTPATIENT)
Dept: FAMILY MEDICINE CLINIC | Facility: CLINIC | Age: 54
End: 2024-06-19

## 2024-06-20 RX ORDER — FOLIC ACID 1 MG/1
5 TABLET ORAL DAILY
Qty: 150 TABLET | Refills: 1 | Status: SHIPPED | OUTPATIENT
Start: 2024-06-20

## 2024-06-20 NOTE — TELEPHONE ENCOUNTER
[facility] called on status of Folic Acid 5mg on back order.     Informed pharmacy, Per provider, can buy OTC.   Pharmacist said They do not sell folic acid 5mg OTC.     Dr. Cain:   Available OTC is folic acid 400mcg OR does doctor want to send PRESCRIPTION for folic acid 1mg and patient to take 5 tablets daily?

## 2024-06-24 ENCOUNTER — OFFICE VISIT (OUTPATIENT)
Dept: ORTHOPEDICS CLINIC | Facility: CLINIC | Age: 54
End: 2024-06-24

## 2024-06-24 VITALS
HEART RATE: 74 BPM | DIASTOLIC BLOOD PRESSURE: 90 MMHG | WEIGHT: 266 LBS | HEIGHT: 64 IN | BODY MASS INDEX: 45.41 KG/M2 | SYSTOLIC BLOOD PRESSURE: 138 MMHG

## 2024-06-24 DIAGNOSIS — M17.11 PRIMARY OSTEOARTHRITIS OF RIGHT KNEE: Primary | ICD-10-CM

## 2024-06-24 RX ORDER — TRIAMCINOLONE ACETONIDE 40 MG/ML
40 INJECTION, SUSPENSION INTRA-ARTICULAR; INTRAMUSCULAR ONCE
Status: COMPLETED | OUTPATIENT
Start: 2024-06-24 | End: 2024-06-24

## 2024-06-24 RX ADMIN — TRIAMCINOLONE ACETONIDE 40 MG: 40 INJECTION, SUSPENSION INTRA-ARTICULAR; INTRAMUSCULAR at 16:10:00

## 2024-06-24 NOTE — PROGRESS NOTES
NURSING INTAKE COMMENTS:   Chief Complaint   Patient presents with    Knee Pain     R knee f/u- had injection on 2024 w/ no improvement- rates pain 10/10 all the time       HPI: This 54 year old female presents today with complaints of right knee pain.  She did not get relief from her last cortisone injection requested repeat.    Past Medical History:    DJD (degenerative joint disease) of knee    HTN (hypertension), benign    Morbid obesity with BMI of 40.0-44.9, adult ([facility])     Past Surgical History:   Procedure Laterality Date          Knee replacement surgery      Tonsillectomy       Current Outpatient Medications   Medication Sig Dispense Refill    folic acid 1 MG Oral Tab Take 5 tablets (5 mg total) by mouth daily. 150 tablet 1    zolpidem 10 MG Oral Tab Take 1 tablet (10 mg total) by mouth nightly. 30 tablet 5    montelukast (SINGULAIR) 10 MG Oral Tab Take 1 tablet (10 mg total) by mouth daily. 90 tablet 3    zolpidem 10 MG Oral Tab Take 1 tablet (10 mg total) by mouth nightly. 30 tablet 5    chlorthalidone 25 MG Oral Tab Take 1 tablet (25 mg total) by mouth daily. For high blood pressure or edema 90 tablet 1    tiZANidine 4 MG Oral Tab Take 0.5 tablets (2 mg total) by mouth every 8 (eight) hours as needed. 40 tablet 0    triamcinolone 0.1 % External Ointment Apply 1 g topically 2 (two) times daily. As needed to effected area (rash) 45 g 0    terbinafine 250 MG Oral Tab Take 1 tablet (250 mg total) by mouth daily. 7 tablet 0    Folic Acid 5 MG Oral Cap Take 1 capsule by mouth daily. 90 capsule 3    escitalopram 20 MG Oral Tab Take 1 tablet (20 mg total) by mouth daily. 90 tablet 3    buPROPion  MG Oral Tablet 24 Hr Take 1 tablet (150 mg total) by mouth daily. 90 tablet 3    celecoxib 200 MG Oral Cap Take 1 capsule (200 mg total) by mouth daily. 30 capsule 0    TOPIRAMATE 25 MG Oral Tab TAKE ONE TABLET BY MOUTH TWICE DAILY 120 tablet 0    Tirzepatide-Weight Management (ZEPBOUND) 2.5  MG/0.5ML Subcutaneous Solution Auto-injector Inject 2.5 mg into the skin once a week. (Patient not taking: Reported on 6/24/2024) 3 mL 2    fluconazole (DIFLUCAN) 150 MG Oral Tab Take 1 tablet (150 mg total) by mouth every other day. (Patient not taking: Reported on 5/2/2024) 2 tablet 0    Phentermine HCl 15 MG Oral Cap Take 1 capsule (15 mg total) by mouth every morning. 30 capsule 5    miSOPROStol (CYTOTEC) 200 MCG Oral Tab Take 1 tablet the night before your endometrial biopsy (Patient not taking: Reported on 5/2/2024) 1 tablet 0     No Known Allergies  History reviewed. No pertinent family history.    Social History     Occupational History    Not on file   Tobacco Use    Smoking status: Never    Smokeless tobacco: Never   Vaping Use    Vaping status: Never Used   Substance and Sexual Activity    Alcohol use: Yes     Comment: socially    Drug use: No    Sexual activity: Not on file        Review of Systems:  GENERAL: feels generally well, no significant weight loss or weight gain  SKIN: no ulcerated or worrisome skin lesions  EYES:denies blurred vision or double vision  HEENT: denies new nasal congestion, sinus pain or ST  LUNGS: denies shortness of breath  CARDIOVASCULAR: denies chest pain  GI: no hematemesis, no worsening heartburn, no diarrhea  : no dysuria, no blood in urine, no difficulty urinating, no incontinence  MUSCULOSKELETAL: no other musculoskeletal complaints other than in HPI  NEURO: no numbness or tingling, no weakness or balance disorder  PSYCHE: no depression or anxiety  HEMATOLOGIC: no hx of blood dyscrasia  ENDOCRINE: no thyroid or diabetes issues  ALL/ASTHMA: no new hx of severe allergy or asthma    Physical Examination:    Ht 5' 4\" (1.626 m)   Wt 266 lb (120.7 kg)   BMI 45.66 kg/m²   Constitutional: appears well hydrated, alert and responsive, no acute distress noted  Extremities: Moderate effusion of the right knee.  Pain with range of motion.    Imaging: No results found.     Lab  Results   Component Value Date    WBC 8.7 08/16/2023    HGB 15.3 08/16/2023    .0 08/16/2023      Lab Results   Component Value Date     (H) 08/16/2023    BUN 15 08/16/2023    CREATSERUM 1.04 (H) 08/16/2023    GFRNAA 65 07/05/2022    GFRAA 75 07/05/2022        Assessment and Plan:  Diagnoses and all orders for this visit:    Primary osteoarthritis of right knee  -     arthrocentesis major joint  -     triamcinolone acetonide (Kenalog-40) 40 MG/ML injection 40 mg        Assessment: I aspirated and injected the right knee.  She is seeing the bariatric physician continuing to try to lose weight.  She will follow-up as needed.    Plan: Procedure: The risks and benefits of a cortisone injection were discussed with the patient.  An informational sheet was also provided and the patient had ample time to review it.  Under sterile preparation, the right knee was injected with 40 mg of Kenalog and 4 cc 0.5% marcaine.  The patient tolerated the procedure well.      The above note was creating using Dragon speech recognition technology. Please excuse any typos.    QUIQUE DAVENPORT MD

## 2024-06-24 NOTE — PROGRESS NOTES
Per verbal order from Dr. Britt, draw up and 4ml of 0.5% Marcaine and 1ml of Kenalog 40 for injection into right knee Dona DIXON MA  Patient provided education handout for cortisone injection.

## 2024-06-26 ENCOUNTER — NURSE TRIAGE (OUTPATIENT)
Dept: FAMILY MEDICINE CLINIC | Facility: CLINIC | Age: 54
End: 2024-06-26

## 2024-06-26 NOTE — TELEPHONE ENCOUNTER
Action Requested: Summary for Provider     []  Critical Lab, Recommendations Needed  [] Need Additional Advice  [x]   FYI    []   Need Orders  [] Need Medications Sent to Pharmacy  []  Other     SUMMARY: CSS transferred call.  Patient had scheduled appointment for 2024 for dizziness.  Patient believes she has benign positional vertigo.  Noted 2 weeks ago when she lies down at night and turned head in certain position, had room spinning sensation that lasted 15 seconds.  Noticed during day if she moves head in certain position can trigger dizziness.  Denies nausea, vomiting, ear pain.  Wears hearing aids in both ears.  Was at Ortho appointment yesterday and blood pressure was 138/90.  States drinking adequate fluids.  Patient declined offer of appointment today with alternate provider.  Only wants to see Dr Cain.  Feels she can wait for appointment 2023.    Advised to call back if symptoms worsen, advised on option of Immediate Care if symptoms worsen.  Message routed to Dr Cain as FYI.    Future Appointments   Date Time Provider Department Center   7/3/2024  9:45 AM Lucas Alba MD [id number]   2024  1:50 PM Eve Cain MD [facility]   2024  4:30 PM Lucas Alba MD [id number]   2024  9:30 AM Lucas Alba MD [id number]   10/14/2024  8:40 AM Checo Britt MD [facility]   2024  2:15 PM Lucas Alba MD [id number]   2024  8:30 AM Lucas Alba MD [id number]   2025  9:00 AM Lucas Alba MD [id number]       Reason for call: Acute  Dizziness  Onset: 2 weeks ago    Spoke with patient,  verified.   Not in distress at time of call.    Reason for Disposition   MILD dizziness (e.g., walking normally) and has NOT been evaluated by physician for this (Exception: Dizziness caused by heat exposure, sudden standing, or poor fluid intake.)    Protocols used: Dizziness-A-OH

## 2024-07-03 ENCOUNTER — OFFICE VISIT (OUTPATIENT)
Dept: SURGERY | Facility: CLINIC | Age: 54
End: 2024-07-03
Payer: MEDICAID

## 2024-07-03 VITALS
HEART RATE: 64 BPM | SYSTOLIC BLOOD PRESSURE: 122 MMHG | WEIGHT: 270 LBS | BODY MASS INDEX: 44.98 KG/M2 | OXYGEN SATURATION: 98 % | DIASTOLIC BLOOD PRESSURE: 70 MMHG | HEIGHT: 65.1 IN

## 2024-07-03 DIAGNOSIS — F43.9 STRESS: ICD-10-CM

## 2024-07-03 DIAGNOSIS — E66.01 MORBID OBESITY WITH BMI OF 40.0-44.9, ADULT (HCC): ICD-10-CM

## 2024-07-03 DIAGNOSIS — I10 HTN (HYPERTENSION), BENIGN: Primary | ICD-10-CM

## 2024-07-03 DIAGNOSIS — M17.0 PRIMARY OSTEOARTHRITIS OF BOTH KNEES: ICD-10-CM

## 2024-07-03 PROCEDURE — 99215 OFFICE O/P EST HI 40 MIN: CPT | Performed by: INTERNAL MEDICINE

## 2024-07-03 RX ORDER — PHENTERMINE HYDROCHLORIDE 30 MG/1
30 CAPSULE ORAL EVERY MORNING
Qty: 30 CAPSULE | Refills: 5 | Status: SHIPPED | OUTPATIENT
Start: 2024-07-03

## 2024-07-03 NOTE — PROGRESS NOTES
[facility], [facility], [place]  [address]  [facility] [address]  Dept: [phone]       Patient:  Eileen Christianson  :      1970  MRN:      [id number]    Chief Complaint:    Chief Complaint   Patient presents with    Follow - Up    Weight Management       SUBJECTIVE     History of Present Illness:  Eileen is being seen today for a follow-up for non surgical weight loss    Past Medical History:   Past Medical History:    DJD (degenerative joint disease) of knee    HTN (hypertension), benign    Morbid obesity with BMI of 40.0-44.9, adult ([facility])        Comorbidities:  Back pain-Improvement?  yes, Joint pain-Improvement?  yes, Hypertension-Improvement?  yes, DARIUS-Improvement?  yes and Snoring-Improvement?  yes    OBJECTIVE     Vitals: /70 (BP Location: Left arm, Patient Position: Sitting, Cuff Size: adult)   Pulse 64   Ht 5' 5.1\" (1.654 m)   Wt 270 lb (122.5 kg)   SpO2 98%   BMI 44.79 kg/m²     Initial weight loss: +06   Total weight loss: +01   Start weight: 269    Wt Readings from Last 3 Encounters:   24 270 lb (122.5 kg)   24 266 lb (120.7 kg)   24 265 lb (120.2 kg)       Patient Medications:    Current Outpatient Medications   Medication Sig Dispense Refill    folic acid 1 MG Oral Tab Take 5 tablets (5 mg total) by mouth daily. 150 tablet 1    zolpidem 10 MG Oral Tab Take 1 tablet (10 mg total) by mouth nightly. 30 tablet 5    montelukast (SINGULAIR) 10 MG Oral Tab Take 1 tablet (10 mg total) by mouth daily. 90 tablet 3    zolpidem 10 MG Oral Tab Take 1 tablet (10 mg total) by mouth nightly. 30 tablet 5    chlorthalidone 25 MG Oral Tab Take 1 tablet (25 mg total) by mouth daily. For high blood pressure or edema 90 tablet 1    tiZANidine 4 MG Oral Tab Take 0.5 tablets (2 mg total) by mouth every 8 (eight) hours as needed. 40 tablet 0    triamcinolone 0.1 % External Ointment Apply 1 g topically 2 (two) times daily. As needed to  effected area (rash) 45 g 0    terbinafine 250 MG Oral Tab Take 1 tablet (250 mg total) by mouth daily. 7 tablet 0    Folic Acid 5 MG Oral Cap Take 1 capsule by mouth daily. 90 capsule 3    escitalopram 20 MG Oral Tab Take 1 tablet (20 mg total) by mouth daily. 90 tablet 3    buPROPion  MG Oral Tablet 24 Hr Take 1 tablet (150 mg total) by mouth daily. 90 tablet 3    celecoxib 200 MG Oral Cap Take 1 capsule (200 mg total) by mouth daily. 30 capsule 0    TOPIRAMATE 25 MG Oral Tab TAKE ONE TABLET BY MOUTH TWICE DAILY 120 tablet 0    Tirzepatide-Weight Management (ZEPBOUND) 2.5 MG/0.5ML Subcutaneous Solution Auto-injector Inject 2.5 mg into the skin once a week. (Patient not taking: Reported on 6/24/2024) 3 mL 2    fluconazole (DIFLUCAN) 150 MG Oral Tab Take 1 tablet (150 mg total) by mouth every other day. (Patient not taking: Reported on 5/2/2024) 2 tablet 0    Phentermine HCl 15 MG Oral Cap Take 1 capsule (15 mg total) by mouth every morning. 30 capsule 5    miSOPROStol (CYTOTEC) 200 MCG Oral Tab Take 1 tablet the night before your endometrial biopsy (Patient not taking: Reported on 5/2/2024) 1 tablet 0     Allergies:  Patient has no known allergies.     Social History:    Social History     Socioeconomic History    Marital status: Single     Spouse name: Not on file    Number of children: Not on file    Years of education: Not on file    Highest education level: Not on file   Occupational History    Not on file   Tobacco Use    Smoking status: Never    Smokeless tobacco: Never   Vaping Use    Vaping status: Never Used   Substance and Sexual Activity    Alcohol use: Yes     Comment: socially    Drug use: No    Sexual activity: Not on file   Other Topics Concern    Not on file   Social History Narrative    Not on file     Social Determinants of Health     Financial Resource Strain: Not on file   Food Insecurity: Not on file   Transportation Needs: Not on file   Physical Activity: Not on file   Stress: Not on  file   Social Connections: Not on file   Housing Stability: Not on file     Surgical History:    Past Surgical History:   Procedure Laterality Date          Knee replacement surgery      Tonsillectomy       Family History:  No family history on file.    Food Journal  Reviewed and Discussed:       Patient has a Food Journal?: no   Patient is reading nutrition labels?    Average Caloric Intake:     Average CHO Intake: 160  Is patient exercising? no  Type of exercise? Limited due to pain    Eating Habits  Patient states the following:  Eats 1 meal(s) per day  Length of time it takes to consume a meal:  20  # of snacks per day: 1 Type of snacks:  Bagel dogs, ch nuggets   Amount of soda consumption per day:  Regular 2 liter daily  Amount of water (in ounces) per day:  48  Drinking between meals only:  yes  Toughest challenge:  Exercise     Nutritional Goals  Limit carbohydrates to 100 gms per day, Eat 100-200 calories within 1 hour of waking  and Eat 3-4 cups of fresh fruits or vegetables daily    Behavior Modifications Reviewed and Discussed  Eat breakfast, Eat 3 meals per day, Plan meals in advance, Read nutrition labels, Drink 64 oz of water per day, Maintain a daily food journal, No drinking 30 minutes before or after meals, Utlize portion control strategies to reduce calorie intake, Identify triggers for eating and manage cues and Eat slowly and take 20 to 30 minutes to complete each meal    Exercise Goals Reviewed and Discussed    Increase as tolerated    ROS:    Constitutional: positive for fatigue  Respiratory: positive for dyspnea on exertion  Cardiovascular: positive for chest pain  Gastrointestinal: negative  Musculoskeletal:positive for arthralgias and back pain  Neurological: positive for headaches  Behavioral/Psych: positive for anxiety and depression  Endocrine: negative  All other systems were reviewed and are negative    Physical Exam:   General appearance: alert, appears stated age, cooperative  and moderately obese  Head: Normocephalic, without obvious abnormality, atraumatic  Back: symmetric, no curvature. ROM normal. No CVA tenderness.  Lungs: clear to auscultation bilaterally  Heart: S1, S2 normal, no murmur, click, rub or gallop, regular rate and rhythm  Abdomen:  soft, obese, non tneder  Extremities: extremities normal, atraumatic, no cyanosis or edema  Pulses: 2+ and symmetric  Skin: Skin color, texture, turgor normal. No rashes or lesions  Neurologic: Grossly normal    ASSESSMENT     HYPERTENSION:  The patient's blood pressure has been well controlled.  she has been checking it as instructed and has remained in relatively good control.      Encounter Diagnosis(ses):   Encounter Diagnoses   Name Primary?    HTN (hypertension), benign Yes    Stress     Primary osteoarthritis of both knees     Morbid obesity with BMI of 40.0-44.9, adult (HCC)        PLAN     Patient is not interested in bariatric surgery. Patient desires to pursue traditional weight loss at this time.      HYPERTENSION: Blood pressure stable on the above medications. No interval change in antihypertensive medication.     DEGENERATIVE JOINT DISEASE: Of the knees is stable. Encourage upper body exercises if unable to perform weight-bearing exercises.      Insulin resistance: may improve with diet and exercise    Goals for next month:  1. Keep a food log.  2. Drink 48-64 ounces of non-caloric beverages per day. No fruit juices or regular soda.  3. Increase activity-upper body exercises, walk 10 minutes per day.  4. Increase fruit and vegetable servings to 5-6 per day.      Needs to cut out soda    Tolerating Topiramate: helps makes soda taste funny    Stress: not controlled  Recommend she takes her Lexapro (not taking)    Tolerating Phentermine  Increase to 30 mg  ekg done    Will start Ozempic via [facility]    Diagnoses and all orders for this visit:    HTN (hypertension), benign    Stress    Primary osteoarthritis of both  knees    Morbid obesity with BMI of 40.0-44.9, adult ([facility])          Lucas Alba MD

## 2024-07-09 DIAGNOSIS — E66.01 MORBID OBESITY WITH BMI OF 40.0-44.9, ADULT (HCC): ICD-10-CM

## 2024-08-12 ENCOUNTER — TELEPHONE (OUTPATIENT)
Dept: INTERNAL MEDICINE UNIT | Facility: HOSPITAL | Age: 54
End: 2024-08-12

## 2024-09-04 ENCOUNTER — PATIENT MESSAGE (OUTPATIENT)
Dept: FAMILY MEDICINE CLINIC | Facility: CLINIC | Age: 54
End: 2024-09-04

## 2024-09-04 DIAGNOSIS — Z00.00 ANNUAL PHYSICAL EXAM: Primary | ICD-10-CM

## 2024-09-05 NOTE — TELEPHONE ENCOUNTER
From: Eileen Christianson  To: Eve Cain  Sent: 9/4/2024 10:13 PM CDT  Subject: Up coming visit    Hello,   Can I get an order for a blood test to check my B-12 levels before my upcoming appointment?  Probably a whole blood work up, so we can talk about it when I see her.    Thank you,  Eileen Christianson

## 2024-09-18 ENCOUNTER — LAB ENCOUNTER (OUTPATIENT)
Dept: LAB | Age: 54
End: 2024-09-18
Attending: FAMILY MEDICINE
Payer: MEDICAID

## 2024-09-18 DIAGNOSIS — Z00.00 ANNUAL PHYSICAL EXAM: ICD-10-CM

## 2024-09-18 LAB
ALBUMIN SERPL-MCNC: 4 G/DL (ref 3.2–4.8)
ALBUMIN/GLOB SERPL: 1.4 {RATIO} (ref 1–2)
ALP LIVER SERPL-CCNC: 60 U/L
ALT SERPL-CCNC: 9 U/L
ANION GAP SERPL CALC-SCNC: 7 MMOL/L (ref 0–18)
AST SERPL-CCNC: 15 U/L (ref ?–34)
BASOPHILS # BLD AUTO: 0.04 X10(3) UL (ref 0–0.2)
BASOPHILS NFR BLD AUTO: 0.6 %
BILIRUB SERPL-MCNC: 1 MG/DL (ref 0.3–1.2)
BUN BLD-MCNC: 16 MG/DL (ref 9–23)
BUN/CREAT SERPL: 15.4 (ref 10–20)
CALCIUM BLD-MCNC: 9.6 MG/DL (ref 8.7–10.4)
CHLORIDE SERPL-SCNC: 111 MMOL/L (ref 98–112)
CHOLEST SERPL-MCNC: 186 MG/DL (ref ?–200)
CO2 SERPL-SCNC: 26 MMOL/L (ref 21–32)
CREAT BLD-MCNC: 1.04 MG/DL
DEPRECATED RDW RBC AUTO: 42.6 FL (ref 35.1–46.3)
EGFRCR SERPLBLD CKD-EPI 2021: 63 ML/MIN/1.73M2 (ref 60–?)
EOSINOPHIL # BLD AUTO: 0.49 X10(3) UL (ref 0–0.7)
EOSINOPHIL NFR BLD AUTO: 6.8 %
ERYTHROCYTE [DISTWIDTH] IN BLOOD BY AUTOMATED COUNT: 13.2 % (ref 11–15)
FASTING PATIENT LIPID ANSWER: YES
FASTING STATUS PATIENT QL REPORTED: YES
GLOBULIN PLAS-MCNC: 2.9 G/DL (ref 2–3.5)
GLUCOSE BLD-MCNC: 94 MG/DL (ref 70–99)
HCT VFR BLD AUTO: 43.9 %
HDLC SERPL-MCNC: 50 MG/DL (ref 40–59)
HGB BLD-MCNC: 14.2 G/DL
IMM GRANULOCYTES # BLD AUTO: 0.01 X10(3) UL (ref 0–1)
IMM GRANULOCYTES NFR BLD: 0.1 %
LDLC SERPL CALC-MCNC: 119 MG/DL (ref ?–100)
LYMPHOCYTES # BLD AUTO: 1.65 X10(3) UL (ref 1–4)
LYMPHOCYTES NFR BLD AUTO: 23 %
MCH RBC QN AUTO: 28.6 PG (ref 26–34)
MCHC RBC AUTO-ENTMCNC: 32.3 G/DL (ref 31–37)
MCV RBC AUTO: 88.3 FL
MONOCYTES # BLD AUTO: 0.5 X10(3) UL (ref 0.1–1)
MONOCYTES NFR BLD AUTO: 7 %
NEUTROPHILS # BLD AUTO: 4.47 X10 (3) UL (ref 1.5–7.7)
NEUTROPHILS # BLD AUTO: 4.47 X10(3) UL (ref 1.5–7.7)
NEUTROPHILS NFR BLD AUTO: 62.5 %
NONHDLC SERPL-MCNC: 136 MG/DL (ref ?–130)
OSMOLALITY SERPL CALC.SUM OF ELEC: 299 MOSM/KG (ref 275–295)
PLATELET # BLD AUTO: 250 10(3)UL (ref 150–450)
POTASSIUM SERPL-SCNC: 4.6 MMOL/L (ref 3.5–5.1)
PROT SERPL-MCNC: 6.9 G/DL (ref 5.7–8.2)
RBC # BLD AUTO: 4.97 X10(6)UL
SODIUM SERPL-SCNC: 144 MMOL/L (ref 136–145)
TRIGL SERPL-MCNC: 96 MG/DL (ref 30–149)
TSI SER-ACNC: 2.28 MIU/ML (ref 0.55–4.78)
VIT B12 SERPL-MCNC: 532 PG/ML (ref 211–911)
VIT D+METAB SERPL-MCNC: 39.8 NG/ML (ref 30–100)
VLDLC SERPL CALC-MCNC: 17 MG/DL (ref 0–30)
WBC # BLD AUTO: 7.2 X10(3) UL (ref 4–11)

## 2024-09-18 PROCEDURE — 80061 LIPID PANEL: CPT

## 2024-09-18 PROCEDURE — 84443 ASSAY THYROID STIM HORMONE: CPT

## 2024-09-18 PROCEDURE — 80053 COMPREHEN METABOLIC PANEL: CPT

## 2024-09-18 PROCEDURE — 82306 VITAMIN D 25 HYDROXY: CPT

## 2024-09-18 PROCEDURE — 36415 COLL VENOUS BLD VENIPUNCTURE: CPT

## 2024-09-18 PROCEDURE — 85025 COMPLETE CBC W/AUTO DIFF WBC: CPT

## 2024-09-18 PROCEDURE — 82607 VITAMIN B-12: CPT

## 2024-09-25 ENCOUNTER — OFFICE VISIT (OUTPATIENT)
Dept: ORTHOPEDICS CLINIC | Facility: CLINIC | Age: 54
End: 2024-09-25
Payer: MEDICAID

## 2024-09-25 VITALS
BODY MASS INDEX: 44.98 KG/M2 | WEIGHT: 270 LBS | HEART RATE: 82 BPM | DIASTOLIC BLOOD PRESSURE: 84 MMHG | HEIGHT: 65 IN | SYSTOLIC BLOOD PRESSURE: 136 MMHG

## 2024-09-25 DIAGNOSIS — M17.11 PRIMARY OSTEOARTHRITIS OF RIGHT KNEE: Primary | ICD-10-CM

## 2024-09-25 PROCEDURE — 99213 OFFICE O/P EST LOW 20 MIN: CPT | Performed by: ORTHOPAEDIC SURGERY

## 2024-09-25 PROCEDURE — 20610 DRAIN/INJ JOINT/BURSA W/O US: CPT | Performed by: ORTHOPAEDIC SURGERY

## 2024-09-25 RX ORDER — TRIAMCINOLONE ACETONIDE 40 MG/ML
40 INJECTION, SUSPENSION INTRA-ARTICULAR; INTRAMUSCULAR ONCE
Status: COMPLETED | OUTPATIENT
Start: 2024-09-25 | End: 2024-09-25

## 2024-09-25 RX ADMIN — TRIAMCINOLONE ACETONIDE 40 MG: 40 INJECTION, SUSPENSION INTRA-ARTICULAR; INTRAMUSCULAR at 10:35:00

## 2024-09-25 NOTE — PROGRESS NOTES
Per verbal order from Dr. Britt, draw up and 4ml of 0.5% Marcaine and 1ml of Kenalog 40 for injection into rightkneeStephanie A, MA  Patient provided education handout for cortisone injection.

## 2024-09-25 NOTE — PROGRESS NOTES
NURSING INTAKE COMMENTS:   Chief Complaint   Patient presents with    Knee Pain     Right knee jonah inj and possible knee drain, last jonah inj 24, pain level today 10/10       HPI: This 54 year old female presents today with complaints of right knee pain.  She had 2 months of pain relief following her last cortisone injection.    Past Medical History:    DJD (degenerative joint disease) of knee    HTN (hypertension), benign    Morbid obesity with BMI of 40.0-44.9, adult ([facility])     Past Surgical History:   Procedure Laterality Date          Knee replacement surgery      Tonsillectomy       Current Outpatient Medications   Medication Sig Dispense Refill    semaglutide 4 MG/3ML Subcutaneous Solution Pen-injector 20 clicks a week 1 each 12    Phentermine HCl 30 MG Oral Cap Take 1 capsule (30 mg total) by mouth every morning. 30 capsule 5    folic acid 1 MG Oral Tab Take 5 tablets (5 mg total) by mouth daily. 150 tablet 1    zolpidem 10 MG Oral Tab Take 1 tablet (10 mg total) by mouth nightly. 30 tablet 5    montelukast (SINGULAIR) 10 MG Oral Tab Take 1 tablet (10 mg total) by mouth daily. 90 tablet 3    zolpidem 10 MG Oral Tab Take 1 tablet (10 mg total) by mouth nightly. 30 tablet 5    chlorthalidone 25 MG Oral Tab Take 1 tablet (25 mg total) by mouth daily. For high blood pressure or edema 90 tablet 1    tiZANidine 4 MG Oral Tab Take 0.5 tablets (2 mg total) by mouth every 8 (eight) hours as needed. 40 tablet 0    triamcinolone 0.1 % External Ointment Apply 1 g topically 2 (two) times daily. As needed to effected area (rash) 45 g 0    terbinafine 250 MG Oral Tab Take 1 tablet (250 mg total) by mouth daily. 7 tablet 0    escitalopram 20 MG Oral Tab Take 1 tablet (20 mg total) by mouth daily. 90 tablet 3    buPROPion  MG Oral Tablet 24 Hr Take 1 tablet (150 mg total) by mouth daily. 90 tablet 3    celecoxib 200 MG Oral Cap Take 1 capsule (200 mg total) by mouth daily. 30 capsule 0    TOPIRAMATE 25 MG  Oral Tab TAKE ONE TABLET BY MOUTH TWICE DAILY 120 tablet 0    fluconazole (DIFLUCAN) 150 MG Oral Tab Take 1 tablet (150 mg total) by mouth every other day. (Patient not taking: Reported on 5/2/2024) 2 tablet 0    miSOPROStol (CYTOTEC) 200 MCG Oral Tab Take 1 tablet the night before your endometrial biopsy (Patient not taking: Reported on 5/2/2024) 1 tablet 0     No Known Allergies  History reviewed. No pertinent family history.    Social History     Occupational History    Not on file   Tobacco Use    Smoking status: Never    Smokeless tobacco: Never   Vaping Use    Vaping status: Never Used   Substance and Sexual Activity    Alcohol use: Yes     Comment: socially    Drug use: No    Sexual activity: Not on file        Review of Systems:  GENERAL: feels generally well, no significant weight loss or weight gain  SKIN: no ulcerated or worrisome skin lesions  EYES:denies blurred vision or double vision  HEENT: denies new nasal congestion, sinus pain or ST  LUNGS: denies shortness of breath  CARDIOVASCULAR: denies chest pain  GI: no hematemesis, no worsening heartburn, no diarrhea  : no dysuria, no blood in urine, no difficulty urinating, no incontinence  MUSCULOSKELETAL: no other musculoskeletal complaints other than in HPI  NEURO: no numbness or tingling, no weakness or balance disorder  PSYCHE: no depression or anxiety  HEMATOLOGIC: no hx of blood dyscrasia  ENDOCRINE: no thyroid or diabetes issues  ALL/ASTHMA: no new hx of severe allergy or asthma    Physical Examination:    /84 (BP Location: Right arm, Patient Position: Sitting, Cuff Size: large)   Pulse 82   Ht 5' 5\" (1.651 m)   Wt 270 lb (122.5 kg)   BMI 44.93 kg/m²   Constitutional: appears well hydrated, alert and responsive, no acute distress noted  Extremities: Pain with range of motion of the right knee    Imaging: No results found.     Lab Results   Component Value Date    WBC 7.2 09/18/2024    HGB 14.2 09/18/2024    .0 09/18/2024      Lab  Results   Component Value Date    GLU 94 09/18/2024    BUN 16 09/18/2024    CREATSERUM 1.04 (H) 09/18/2024    GFRNAA 65 07/05/2022    GFRAA 75 07/05/2022        Assessment and Plan:  Diagnoses and all orders for this visit:    Primary osteoarthritis of right knee  -     arthrocentesis major joint  -     triamcinolone acetonide (Kenalog-40) 40 MG/ML injection 40 mg        Assessment: Right knee osteoarthritis  Procedure: The risks and benefits of a cortisone injection were discussed with the patient.  An informational sheet was also provided and the patient had ample time to review it.  Under sterile preparation, the right knee was injected with 40 mg of Kenalog and 4 cc 0.5% marcaine.  The patient tolerated the procedure well.      Plan: Follow-up as needed    The above note was creating using Dragon speech recognition technology. Please excuse any typos.    QUIQUE DAVENPORT MD

## 2024-12-19 ENCOUNTER — OFFICE VISIT (OUTPATIENT)
Dept: SURGERY | Facility: CLINIC | Age: 54
End: 2024-12-19
Payer: MEDICAID

## 2024-12-19 VITALS
HEART RATE: 86 BPM | SYSTOLIC BLOOD PRESSURE: 128 MMHG | WEIGHT: 278.88 LBS | BODY MASS INDEX: 46.46 KG/M2 | HEIGHT: 65 IN | OXYGEN SATURATION: 99 % | DIASTOLIC BLOOD PRESSURE: 70 MMHG

## 2024-12-19 DIAGNOSIS — E66.01 MORBID OBESITY WITH BMI OF 45.0-49.9, ADULT (HCC): ICD-10-CM

## 2024-12-19 DIAGNOSIS — E88.819 INSULIN RESISTANCE: ICD-10-CM

## 2024-12-19 DIAGNOSIS — M17.0 PRIMARY OSTEOARTHRITIS OF BOTH KNEES: ICD-10-CM

## 2024-12-19 DIAGNOSIS — I10 HTN (HYPERTENSION), BENIGN: Primary | ICD-10-CM

## 2024-12-19 DIAGNOSIS — F43.9 STRESS: ICD-10-CM

## 2024-12-19 DIAGNOSIS — E66.01 MORBID OBESITY WITH BMI OF 40.0-44.9, ADULT (HCC): ICD-10-CM

## 2024-12-19 PROCEDURE — 99215 OFFICE O/P EST HI 40 MIN: CPT | Performed by: INTERNAL MEDICINE

## 2024-12-19 RX ORDER — PHENTERMINE HYDROCHLORIDE 30 MG/1
30 CAPSULE ORAL EVERY MORNING
Qty: 30 CAPSULE | Refills: 5 | Status: SHIPPED | OUTPATIENT
Start: 2024-12-19

## 2024-12-19 NOTE — PROGRESS NOTES
[facility], [facility], [place]  [address]  [facility] [address]  Dept: [phone]       Patient:  Eileen Christianson  :      1970  MRN:      [id number]    Chief Complaint:    Chief Complaint   Patient presents with    Follow - Up    Weight Management       SUBJECTIVE     History of Present Illness:  Eileen is being seen today for a follow-up for non surgical weight loss    Past Medical History:   Past Medical History:    DJD (degenerative joint disease) of knee    HTN (hypertension), benign    Morbid obesity with BMI of 40.0-44.9, adult (HCC)        Comorbidities:  Back pain-Improvement?  yes, Joint pain-Improvement?  yes, Hypertension-Improvement?  yes, DARIUS-Improvement?  yes and Snoring-Improvement?  yes    OBJECTIVE     Vitals: /70   Pulse 86   Ht 5' 5\" (1.651 m)   Wt 278 lb 14.4 oz (126.5 kg)   SpO2 99%   BMI 46.41 kg/m²     Initial weight loss: +08   Total weight loss: +09   Start weight: 269    Wt Readings from Last 3 Encounters:   24 278 lb 14.4 oz (126.5 kg)   24 270 lb (122.5 kg)   24 270 lb (122.5 kg)       Patient Medications:    Current Outpatient Medications   Medication Sig Dispense Refill    semaglutide 4 MG/3ML Subcutaneous Solution Pen-injector 20 clicks a week 1 each 12    Phentermine HCl 30 MG Oral Cap Take 1 capsule (30 mg total) by mouth every morning. 30 capsule 5    folic acid 1 MG Oral Tab Take 5 tablets (5 mg total) by mouth daily. 150 tablet 1    montelukast (SINGULAIR) 10 MG Oral Tab Take 1 tablet (10 mg total) by mouth daily. 90 tablet 3    zolpidem 10 MG Oral Tab Take 1 tablet (10 mg total) by mouth nightly. 30 tablet 5    chlorthalidone 25 MG Oral Tab Take 1 tablet (25 mg total) by mouth daily. For high blood pressure or edema 90 tablet 1    tiZANidine 4 MG Oral Tab Take 0.5 tablets (2 mg total) by mouth every 8 (eight) hours as needed. 40 tablet 0    triamcinolone 0.1 % External Ointment Apply 1 g  topically 2 (two) times daily. As needed to effected area (rash) 45 g 0    terbinafine 250 MG Oral Tab Take 1 tablet (250 mg total) by mouth daily. 7 tablet 0    escitalopram 20 MG Oral Tab Take 1 tablet (20 mg total) by mouth daily. 90 tablet 3    buPROPion  MG Oral Tablet 24 Hr Take 1 tablet (150 mg total) by mouth daily. 90 tablet 3    celecoxib 200 MG Oral Cap Take 1 capsule (200 mg total) by mouth daily. 30 capsule 0    TOPIRAMATE 25 MG Oral Tab TAKE ONE TABLET BY MOUTH TWICE DAILY 120 tablet 0    fluconazole (DIFLUCAN) 150 MG Oral Tab Take 1 tablet (150 mg total) by mouth every other day. (Patient not taking: Reported on 2024) 2 tablet 0    miSOPROStol (CYTOTEC) 200 MCG Oral Tab Take 1 tablet the night before your endometrial biopsy (Patient not taking: Reported on 2024) 1 tablet 0     Allergies:  Patient has no known allergies.     Social History:    Social History     Socioeconomic History    Marital status: Single     Spouse name: Not on file    Number of children: Not on file    Years of education: Not on file    Highest education level: Not on file   Occupational History    Not on file   Tobacco Use    Smoking status: Never    Smokeless tobacco: Never   Vaping Use    Vaping status: Never Used   Substance and Sexual Activity    Alcohol use: Yes     Comment: socially    Drug use: No    Sexual activity: Not on file   Other Topics Concern    Not on file   Social History Narrative    Not on file     Social Drivers of Health     Financial Resource Strain: Not on file   Food Insecurity: Not on file   Transportation Needs: Not on file   Physical Activity: Not on file   Stress: Not on file   Social Connections: Not on file   Housing Stability: Not on file     Surgical History:    Past Surgical History:   Procedure Laterality Date          Knee replacement surgery      Tonsillectomy       Family History:  No family history on file.    Food Journal  Reviewed and Discussed:       Patient  has a Food Journal?: no   Patient is reading nutrition labels?    Average Caloric Intake:     Average CHO Intake: 160  Is patient exercising? no  Type of exercise? Limited due to pain    Eating Habits  Patient states the following:  Eats 1 meal(s) per day  Length of time it takes to consume a meal:  20  # of snacks per day: 1 Type of snacks:  Bagel dogs, ch nuggets   Amount of soda consumption per day:  Regular 2 liter daily  Amount of water (in ounces) per day:  48  Drinking between meals only:  yes  Toughest challenge:  Exercise     Nutritional Goals  Limit carbohydrates to 100 gms per day, Eat 100-200 calories within 1 hour of waking  and Eat 3-4 cups of fresh fruits or vegetables daily    Behavior Modifications Reviewed and Discussed  Eat breakfast, Eat 3 meals per day, Plan meals in advance, Read nutrition labels, Drink 64 oz of water per day, Maintain a daily food journal, No drinking 30 minutes before or after meals, Utlize portion control strategies to reduce calorie intake, Identify triggers for eating and manage cues and Eat slowly and take 20 to 30 minutes to complete each meal    Exercise Goals Reviewed and Discussed    Increase as tolerated    ROS:    Constitutional: positive for fatigue  Respiratory: positive for dyspnea on exertion  Cardiovascular: positive for chest pain  Gastrointestinal: negative  Musculoskeletal:positive for arthralgias and back pain  Neurological: positive for headaches  Behavioral/Psych: positive for anxiety and depression  Endocrine: negative  All other systems were reviewed and are negative    Physical Exam:   General appearance: alert, appears stated age, cooperative and moderately obese  Head: Normocephalic, without obvious abnormality, atraumatic  Back: symmetric, no curvature. ROM normal. No CVA tenderness.  Lungs: clear to auscultation bilaterally  Heart: S1, S2 normal, no murmur, click, rub or gallop, regular rate and rhythm  Abdomen:  soft, obese, non  tneder  Extremities: extremities normal, atraumatic, no cyanosis or edema  Pulses: 2+ and symmetric  Skin: Skin color, texture, turgor normal. No rashes or lesions  Neurologic: Grossly normal    ASSESSMENT     HYPERTENSION:  The patient's blood pressure has been well controlled.  she has been checking it as instructed and has remained in relatively good control.      Encounter Diagnosis(ses):   Encounter Diagnoses   Name Primary?    HTN (hypertension), benign Yes    Stress     Primary osteoarthritis of both knees     Insulin resistance     Morbid obesity with BMI of 45.0-49.9, adult ([facility])        PLAN     Thinking about surgical options  Aware she will have to go to [facility]     HYPERTENSION: Blood pressure stable on the above medications. No interval change in antihypertensive medication.     DEGENERATIVE JOINT DISEASE: Of the knees is stable. Encourage upper body exercises if unable to perform weight-bearing exercises.      Insulin resistance: may improve with diet and exercise    Goals for next month:  1. Keep a food log.  2. Drink 48-64 ounces of non-caloric beverages per day. No fruit juices or regular soda.  3. Increase activity-upper body exercises, walk 10 minutes per day.  4. Increase fruit and vegetable servings to 5-6 per day.      Needs to cut out soda    Tolerating Topiramate: helps makes soda taste funny    Stress: not controlled  Recommend she takes her Lexapro (not taking)    Tolerating Phentermine  Refill at 30 mg  ekg done    Will start Ozempic via [facility]    Diagnoses and all orders for this visit:    HTN (hypertension), benign    Stress    Primary osteoarthritis of both knees    Insulin resistance    Morbid obesity with BMI of 45.0-49.9, adult ([facility])          Lucas Alba MD

## 2024-12-20 DIAGNOSIS — G47.00 INSOMNIA, UNSPECIFIED TYPE: ICD-10-CM

## 2024-12-20 RX ORDER — TOPIRAMATE 25 MG/1
25 TABLET, FILM COATED ORAL 2 TIMES DAILY
Qty: 120 TABLET | Refills: 0 | Status: SHIPPED | OUTPATIENT
Start: 2024-12-20

## 2024-12-21 RX ORDER — MISOPROSTOL 200 UG/1
TABLET ORAL
Qty: 1 TABLET | Refills: 0 | OUTPATIENT
Start: 2024-12-21

## 2024-12-23 RX ORDER — CELECOXIB 200 MG/1
200 CAPSULE ORAL DAILY
Qty: 30 CAPSULE | Refills: 0 | Status: SHIPPED | OUTPATIENT
Start: 2024-12-23

## 2024-12-25 DIAGNOSIS — G47.00 INSOMNIA, UNSPECIFIED TYPE: ICD-10-CM

## 2024-12-26 RX ORDER — CHLORTHALIDONE 25 MG/1
25 TABLET ORAL DAILY
Qty: 90 TABLET | Refills: 3 | Status: SHIPPED | OUTPATIENT
Start: 2024-12-26

## 2024-12-26 RX ORDER — ZOLPIDEM TARTRATE 10 MG/1
10 TABLET ORAL NIGHTLY
Qty: 30 TABLET | Refills: 5 | OUTPATIENT
Start: 2024-12-26 | End: 2025-12-21

## 2024-12-26 RX ORDER — ESCITALOPRAM OXALATE 20 MG/1
20 TABLET ORAL DAILY
Qty: 90 TABLET | Refills: 3 | OUTPATIENT
Start: 2024-12-26

## 2024-12-26 RX ORDER — BUPROPION HYDROCHLORIDE 150 MG/1
150 TABLET ORAL DAILY
Qty: 90 TABLET | Refills: 3 | OUTPATIENT
Start: 2024-12-26

## 2024-12-26 RX ORDER — ZOLPIDEM TARTRATE 10 MG/1
10 TABLET ORAL NIGHTLY
Qty: 30 TABLET | Refills: 0 | OUTPATIENT
Start: 2024-12-26 | End: 2025-12-21

## 2024-12-26 NOTE — TELEPHONE ENCOUNTER
Bupropion ER 150m year supply sent to [facility] in [place] on [address] on 2024    Escitalopram 20m year supply sent to [facility] in [place] on [address] on 2024

## 2024-12-26 NOTE — TELEPHONE ENCOUNTER
Refill passed per [facility] protocol.  Requested Prescriptions   Pending Prescriptions Disp Refills    fluconazole (DIFLUCAN) 150 MG Oral Tab 2 tablet 0     Sig: Take 1 tablet (150 mg total) by mouth every other day.       There is no refill protocol information for this order       zolpidem 10 MG Oral Tab 30 tablet 5     Sig: Take 1 tablet (10 mg total) by mouth nightly.       Controlled Substance Medication Failed - 12/26/2024  7:38 AM        Failed - This medication is a controlled substance - forward to provider to refill          chlorthalidone 25 MG Oral Tab 90 tablet 1     Sig: Take 1 tablet (25 mg total) by mouth daily. For high blood pressure or edema       Hypertension Medications Protocol Passed - 12/26/2024  7:38 AM        Passed - CMP or BMP in past 12 months        Passed - Last BP reading less than 140/90     BP Readings from Last 1 Encounters:   12/19/24 128/70               Passed - In person appointment or virtual visit in the past 12 mos or appointment in next 3 mos     Recent Outpatient Visits              [facility]    [facility]    3 months ago [facility]    [facility]    [facility]    6 months ago Primary osteoarthritis of right knee    [facility]    [facility] Visit    7 months ago Vaginal bleeding    [facility] Li West APRN    Office Visit          Future Appointments         Provider Department Appt Notes    Tomorrow Abhishek Macario MD [facility] Ear and lymph pain    In 1 week Eve Cain MD  [facility] I need a Pap smear, and I think I may be diabetic. - last px 10/3/2023    In 3 weeks Checo Britt MD [facility] Drain knee of fluid, and Cortisone shot.    In 4 months Lucas Alba MD [facility] Weight loss    In 5 months Lucas Alba MD [facility] Weight loss                    Passed - EGFRCR or GFRNAA > 50     GFR Evaluation  EGFRCR: 63 , resulted on 9/18/2024            tiZANidine 4 MG Oral Tab 40 tablet 0     Sig: Take 0.5 tablets (2 mg total) by mouth every 8 (eight) hours as needed.       There is no refill protocol information for this order       triamcinolone 0.1 % External Ointment 45 g 0     Sig: Apply 1 g topically 2 (two) times daily. As needed to effected area (rash)       There is no refill protocol information for this order       terbinafine 250 MG Oral Tab 7 tablet 0     Sig: Take 1 tablet (250 mg total) by mouth daily.       There is no refill protocol information for this order       folic acid 1 MG Oral Tab 150 tablet 1     Sig: Take 5 tablets (5 mg total) by mouth daily.       There is no refill protocol information for this order      Refused Prescriptions Disp Refills    escitalopram 20 MG Oral Tab 90 tablet 3     Sig: Take 1 tablet (20 mg total) by mouth daily.       Psychiatric Non-Scheduled (Anti-Anxiety) Passed - 12/26/2024  7:38 AM        Passed - In person appointment or virtual visit in the past 6 mos or appointment in next 3 mos     Recent Outpatient Visits              1 week ago HTN (hypertension), benign    [facility] Lucas Alba MD    Office Visit    3 months ago Primary osteoarthritis of right knee    [facility] Checo Britt MD    Office Visit    5 months ago HTN (hypertension), benign     [facility]    [facility] Visit    6 months ago [facility] osteoarthritis of right knee    [facility]    [facility] Visit    7 months ago Vaginal bleeding    [facility] Li West APRN    Office Visit          Future Appointments         Provider Department Appt Notes    Tomorrow Abhishek Macario MD [facility] Ear and lymph pain    In 1 week Eve Cain MD [facility] I need a Pap smear, and I think I may be diabetic. - last px 10/3/2023    In 3 weeks Checo Britt MD [facility] Drain knee of fluid, and Cortisone shot.    In 4 months Lucas Alba MD [facility] Weight loss    In 5 months Lucas Alba MD [facility] Weight loss                    Passed - Depression Screening completed within the past 12 months          buPROPion  MG Oral Tablet 24 Hr 90 tablet 3     Sig: Take 1 tablet (150 mg total) by mouth daily.       Psychiatric Non-Scheduled (Anti-Anxiety) Passed - 12/26/2024  7:38 AM        Passed - In person appointment or virtual visit in the past 6 mos or appointment in next 3 mos     Recent Outpatient Visits              1 week ago [facility] (hypertension), benign    [facility]    Office Visit    3 months ago Primary osteoarthritis of right knee    [facility]    [facility]    5 months ago [facility] (hypertension[facility]    [facility]    6 months ago Primary osteoarthritis of right knee    [facility]    Office Visit    7 months ago Vaginal bleeding    [facility] Li West APRN    Office Visit          Future Appointments         Provider Department Appt Notes    Tomorrow Abhishek Macario MD [facility] Ear and lymph pain    In 1 week Eve Cain MD [facility] I need a Pap smear, and I think I may be diabetic. - last px 10/3/2023    In 3 weeks Checo Britt MD [facility] Drain knee of fluid, and Cortisone shot.    In 4 months Lucas Alba MD [facility] Weight loss    In 5 months Lucas Alba MD [facility] Weight loss                    Passed - Depression Screening completed within the past 12 months           Future Appointments         Provider Department Appt Notes    Tomorrow Abhishek Macario MD [facility] Ear and lymph pain    In 1 week Eve Cain MD [facility] I need a Pap smear, and I think I may be diabetic. - last px 10/3/2023    In 3 weeks Checo Britt MD [facility] Drain knee of fluid, and Cortisone shot.    In 4 months [facility]    In 5 months [facility]          Recent Outpatient Visits              1 week ago HTN (hypertension), benign    [facility] Lucas Alba MD    Office Visit    3 months ago Primary osteoarthritis of right knee    [facility]    Office Visit    5 months ago HTN  (hypertension), benign    [facility] Lucas Gloria MD    Office Visit    6 months ago Primary osteoarthritis of right knee    [facility], Checo Preston MD    Office Visit    7 months ago Vaginal bleeding    [facility] [place], Li Wong APRN    Office Visit

## 2024-12-26 NOTE — TELEPHONE ENCOUNTER
Please review; protocol failed/No Protocol    Zolpidem  Recent Fills: 09/03/2024, 10/05/2024, 11/15/2024    Last Rx Written: 06/18/2024    Last Office Visit: 03/27/2024  Future Appointments   Date Time Provider Department Center   1/3/2025  9:10 AM Eve Cain MD [facility]     Requested Prescriptions   Pending Prescriptions Disp Refills    fluconazole (DIFLUCAN) 150 MG Oral Tab 2 tablet 0     Sig: Take 1 tablet (150 mg total) by mouth every other day.       There is no refill protocol information for this order       zolpidem 10 MG Oral Tab 30 tablet 5     Sig: Take 1 tablet (10 mg total) by mouth nightly.       Controlled Substance Medication Failed - 12/26/2024  7:42 AM        Failed - This medication is a controlled substance - forward to provider to refill          tiZANidine 4 MG Oral Tab 40 tablet 0     Sig: Take 0.5 tablets (2 mg total) by mouth every 8 (eight) hours as needed.       There is no refill protocol information for this order       triamcinolone 0.1 % External Ointment 45 g 0     Sig: Apply 1 g topically 2 (two) times daily. As needed to effected area (rash)       There is no refill protocol information for this order       terbinafine 250 MG Oral Tab 7 tablet 0     Sig: Take 1 tablet (250 mg total) by mouth daily.       There is no refill protocol information for this order       folic acid 1 MG Oral Tab 150 tablet 1     Sig: Take 5 tablets (5 mg total) by mouth daily.       There is no refill protocol information for this order      Signed Prescriptions Disp Refills    chlorthalidone 25 MG Oral Tab 90 tablet 3     Sig: Take 1 tablet (25 mg total) by mouth daily. For high blood pressure or edema       Hypertension Medications Protocol Passed - 12/26/2024  7:42 AM        Passed - CMP or BMP in past 12 months        Passed - Last BP reading less than 140/90     BP Readings from Last 1 Encounters:   12/19/24 128/70               Passed - In person appointment or virtual visit in the  past 12 mos or appointment in next 3 mos     Recent Outpatient Visits              1 week ago HTN (hypertension), benign    [facility] Lucas Alba MD    Office Visit    3 months ago Primary osteoarthritis of right knee    [facility] Checo Britt MD    Office Visit    5 months ago HTN (hypertension), [facility] Lucas Alba MD    Office Visit    6 months ago Primary osteoarthritis of right knee    [facility] Checo Britt MD    Office Visit    7 months ago Vaginal bleeding    [facility] Li West APRN    Office Visit          Future Appointments         Provider Department Appt Notes    Tomorrow Abhishek Macario MD [facility] Ear and lymph pain    In 1 week Eve Cain MD [facility] I need a Pap smear, and I think I may be diabetic. - last px 10/3/2023    In 3 weeks Checo Britt MD [facility] Drain knee of fluid, and Cortisone shot.    In 4 months Lucas Alba MD [facility] Weight loss    In 5 months Lucas Alba MD [facility] Weight loss                    Passed - EGFRCR or GFRNAA > 50     GFR Evaluation  EGFRCR: 63 , resulted on 9/18/2024           Refused Prescriptions Disp Refills    escitalopram 20 MG Oral Tab 90 tablet 3     Sig: Take 1 tablet (20 mg total) by mouth daily.       Psychiatric Non-Scheduled (Anti-Anxiety) Passed - 12/26/2024  7:42 AM        Passed - In person appointment or virtual visit in the past 6 mos or appointment in next 3 mos     Recent Outpatient Visits              1 week ago [facility] (hypertension), [facility]    Office Visit    3 months ago Primary osteoarthritis of right knee    [facility]    Office Visit    5 months ago HTN (hypertension), benign    [facility]    Office Visit    6 months ago Primary osteoarthritis of right knee    [facility]    [facility]    7 months ago Vaginal bleeding    [facility]    Office Visit          Future Appointments         Provider Department Appt Notes    Tomorrow Abhishek Macario MD [facility] Ear and lymph pain    In 1 week [facility] Cain MD [facility] I need a Pap smear, and I think I may be diabetic. - last px 10/3/2023    In 3 weeks Checo Britt MD [facility] Drain knee of fluid, and Cortisone shot.    In 4 months Lucas Alba MD [facility] Weight loss    In 5 months Lucas Alba MD [facility] Weight loss                    Passed - Depression Screening completed within the past 12 months          buPROPion  MG Oral Tablet 24 Hr 90 tablet 3     Sig: Take 1 tablet (150 mg total) by mouth daily.       Psychiatric Non-Scheduled (Anti-Anxiety) Passed - 12/26/2024  7:42 AM        Passed - In person appointment or virtual visit in the past 6 mos or appointment in next 3 mos     Recent Outpatient Visits              1 week ago HTN (hypertension), benign    [facility]    Office Visit    3 months ago Primary osteoarthritis of right knee    [facility]    [facility] Visit    5 months ago HTN (hypertension), benign    [facility] Lucas Alba MD    Office Visit    6 months ago Primary osteoarthritis of right knee    [facility] Checo Britt MD    Office Visit    7 months ago Vaginal bleeding    [facility] Li West APRN    Office Visit          Future Appointments         Provider Department Appt Notes    Tomorrow Abhishek Macario MD [facility] Ear and lymph pain    In 1 week Eve Cain MD [facility] I need a Pap smear, and I think I may be diabetic. - last px 10/3/2023    In 3 weeks Checo Britt MD [facility] Drain knee of fluid, and Cortisone shot.    In 4 months [facility]    In 5 months Lucas Alba MD [facility] Weight loss                    Passed - Depression Screening completed within the past 12 months           Future Appointments         Provider Department Appt Notes    Tomorrow Abhishek Macario MD [facility] Ear and lymph pain    In 1 week Eve Cain MD [facility] I need a Pap smear, and I think I may be diabetic. - last px 10/3/2023    In 3 weeks [facility] of fluid, and Cortisone shot.    In 4 months [facility]    In 5 months [facility]          Recent Outpatient Visits              1 week ago HTN  (hypertension), [facility], Lucas Gloria MD    Office Visit    3 months ago Primary osteoarthritis of right knee    [facility], Checo Preston MD    Office Visit    5 months ago HTN (hypertension), [facility], Lucas Gloria MD    Office Visit    6 months ago Primary osteoarthritis of right knee    [facility], Checo Preston MD    Office Visit    7 months ago Vaginal bleeding    [facility], Li Wong APRN    Office Visit

## 2024-12-30 RX ORDER — FOLIC ACID 1 MG/1
5 TABLET ORAL DAILY
Qty: 150 TABLET | Refills: 1 | Status: SHIPPED | OUTPATIENT
Start: 2024-12-30

## 2024-12-30 RX ORDER — ZOLPIDEM TARTRATE 10 MG/1
10 TABLET ORAL NIGHTLY
Qty: 30 TABLET | Refills: 0 | Status: SHIPPED | OUTPATIENT
Start: 2024-12-30 | End: 2025-12-25

## 2024-12-30 RX ORDER — TERBINAFINE HYDROCHLORIDE 250 MG/1
250 TABLET ORAL DAILY
Qty: 7 TABLET | Refills: 0 | Status: SHIPPED | OUTPATIENT
Start: 2024-12-30

## 2024-12-30 RX ORDER — FLUCONAZOLE 150 MG/1
150 TABLET ORAL EVERY OTHER DAY
Qty: 2 TABLET | Refills: 0 | Status: SHIPPED | OUTPATIENT
Start: 2024-12-30

## 2024-12-30 RX ORDER — TRIAMCINOLONE ACETONIDE 1 MG/G
1 OINTMENT TOPICAL 2 TIMES DAILY
Qty: 45 G | Refills: 0 | Status: SHIPPED | OUTPATIENT
Start: 2024-12-30

## 2025-01-03 ENCOUNTER — OFFICE VISIT (OUTPATIENT)
Dept: FAMILY MEDICINE CLINIC | Facility: CLINIC | Age: 55
End: 2025-01-03

## 2025-01-03 VITALS
HEART RATE: 66 BPM | WEIGHT: 283 LBS | BODY MASS INDEX: 47.15 KG/M2 | TEMPERATURE: 98 F | HEIGHT: 65 IN | DIASTOLIC BLOOD PRESSURE: 86 MMHG | RESPIRATION RATE: 20 BRPM | SYSTOLIC BLOOD PRESSURE: 126 MMHG | OXYGEN SATURATION: 97 %

## 2025-01-03 DIAGNOSIS — Z12.11 SCREENING FOR COLON CANCER: ICD-10-CM

## 2025-01-03 DIAGNOSIS — E66.01 MORBID (SEVERE) OBESITY DUE TO EXCESS CALORIES (HCC): Primary | ICD-10-CM

## 2025-01-03 DIAGNOSIS — Z12.31 SCREENING MAMMOGRAM FOR BREAST CANCER: ICD-10-CM

## 2025-01-03 LAB — HEMOGLOBIN A1C: 5.2 % (ref 4.3–5.6)

## 2025-01-03 NOTE — PROGRESS NOTES
Subjective:   Patient ID: Eileen Christianson is a 54 year old female.    HPI  Patient here for f/u and for physical   She has been worried about being thirsty   Trying to loose weight seeing dr Alba   Depression is better controlled   Doing better overall   Is worried that has diabetes   Also no more vaginal bleeding  Did not want to proceed with uterine biopsy and is aware that it was needed to be done to r/o serious reasons of uterine bleeding   Complaining about some ear pain   History/Other:   Review of Systems    Constitutional: Negative.  Negative for activity change, appetite change, diaphoresis and fatigue.   Heent see hpi   Respiratory: Negative.  Negative for apnea, cough, chest tightness and shortness of breath.    Cardiovascular: Negative.  Negative for chest pain, palpitations and leg swelling.   Gastrointestinal: Negative.  Negative for abdominal pain.   Skin: Negative.           Psychiatric/Behavioral: see hpi         Current Outpatient Medications   Medication Sig Dispense Refill    fluconazole (DIFLUCAN) 150 MG Oral Tab Take 1 tablet (150 mg total) by mouth every other day. 2 tablet 0    zolpidem 10 MG Oral Tab Take 1 tablet (10 mg total) by mouth nightly. 30 tablet 0    tiZANidine 4 MG Oral Tab Take 0.5 tablets (2 mg total) by mouth every 8 (eight) hours as needed. 40 tablet 0    triamcinolone 0.1 % External Ointment Apply 1 g topically 2 (two) times daily. As needed to effected area (rash) 45 g 0    terbinafine 250 MG Oral Tab Take 1 tablet (250 mg total) by mouth daily. 7 tablet 0    folic acid 1 MG Oral Tab Take 5 tablets (5 mg total) by mouth daily. 150 tablet 1    chlorthalidone 25 MG Oral Tab Take 1 tablet (25 mg total) by mouth daily. For high blood pressure or edema 90 tablet 3    celecoxib 200 MG Oral Cap Take 1 capsule (200 mg total) by mouth daily. 30 capsule 0    topiramate 25 MG Oral Tab Take 1 tablet (25 mg total) by mouth 2 (two) times daily. 120 tablet 0    Phentermine HCl 30 MG Oral  Cap Take 1 capsule (30 mg total) by mouth every morning. 30 capsule 5    semaglutide 4 MG/3ML Subcutaneous Solution Pen-injector 20 clicks a week 1 each 12    montelukast (SINGULAIR) 10 MG Oral Tab Take 1 tablet (10 mg total) by mouth daily. 90 tablet 3    escitalopram 20 MG Oral Tab Take 1 tablet (20 mg total) by mouth daily. 90 tablet 3    buPROPion  MG Oral Tablet 24 Hr Take 1 tablet (150 mg total) by mouth daily. 90 tablet 3    miSOPROStol (CYTOTEC) 200 MCG Oral Tab Take 1 tablet the night before your endometrial biopsy (Patient not taking: Reported on 5/2/2024) 1 tablet 0     Allergies:Allergies[1]    Objective:   Physical Exam  Constitutional:       Appearance: She is well-developed. She is obese.   Cardiovascular:      Rate and Rhythm: Normal rate and regular rhythm.      Heart sounds: Normal heart sounds.   Pulmonary:      Effort: Pulmonary effort is normal.      Breath sounds: Normal breath sounds.   Skin:     General: Skin is warm and dry.   Neurological:      Mental Status: She is alert.      Deep Tendon Reflexes: Reflexes are normal and symmetric.         Assessment & Plan:   1. Morbid (severe) obesity due to excess calories (HCC)    Cpm with dr Alba   2. Annual physical no tests needed at this time except mammo and colonoscopy   3. Depression controlled   F/u in 3 months     Orders Placed This Encounter   Procedures    POC Hemoglobin A1C       Meds This Visit:  Requested Prescriptions      No prescriptions requested or ordered in this encounter       Imaging & Referrals:  None         [1] No Known Allergies

## 2025-01-25 DIAGNOSIS — G47.00 INSOMNIA, UNSPECIFIED TYPE: ICD-10-CM

## 2025-01-27 DIAGNOSIS — G47.00 INSOMNIA, UNSPECIFIED TYPE: ICD-10-CM

## 2025-01-27 RX ORDER — ZOLPIDEM TARTRATE 10 MG/1
10 TABLET ORAL NIGHTLY
Qty: 30 TABLET | Refills: 0 | Status: CANCELLED | OUTPATIENT
Start: 2025-01-27 | End: 2026-01-22

## 2025-01-29 NOTE — TELEPHONE ENCOUNTER
Review pended refill request as it does not fall under a protocol.    Last Rx: 12/30/24 LOV: 1/3/25

## 2025-01-30 RX ORDER — ZOLPIDEM TARTRATE 10 MG/1
10 TABLET ORAL NIGHTLY
Qty: 30 TABLET | Refills: 0 | Status: SHIPPED | OUTPATIENT
Start: 2025-01-30 | End: 2026-01-25

## 2025-02-05 ENCOUNTER — OFFICE VISIT (OUTPATIENT)
Dept: ORTHOPEDICS CLINIC | Facility: CLINIC | Age: 55
End: 2025-02-05
Payer: MEDICAID

## 2025-02-05 VITALS
HEIGHT: 65 IN | DIASTOLIC BLOOD PRESSURE: 96 MMHG | BODY MASS INDEX: 46.71 KG/M2 | SYSTOLIC BLOOD PRESSURE: 143 MMHG | HEART RATE: 64 BPM | WEIGHT: 280.38 LBS

## 2025-02-05 DIAGNOSIS — M17.11 PRIMARY OSTEOARTHRITIS OF RIGHT KNEE: Primary | ICD-10-CM

## 2025-02-05 PROCEDURE — 20610 DRAIN/INJ JOINT/BURSA W/O US: CPT | Performed by: PHYSICIAN ASSISTANT

## 2025-02-05 RX ORDER — TRIAMCINOLONE ACETONIDE 40 MG/ML
40 INJECTION, SUSPENSION INTRA-ARTICULAR; INTRAMUSCULAR ONCE
Status: COMPLETED | OUTPATIENT
Start: 2025-02-05 | End: 2025-02-05

## 2025-02-05 RX ADMIN — TRIAMCINOLONE ACETONIDE 40 MG: 40 INJECTION, SUSPENSION INTRA-ARTICULAR; INTRAMUSCULAR at 09:05:00

## 2025-02-05 NOTE — PROGRESS NOTES
NURSING INTAKE COMMENTS:   Chief Complaint   Patient presents with    Knee Pain     R knee - had injection on 2024 that helped for 3 mos- rates pain 8/10 all the time- pt requesting another injection today       HPI: This 54 year old female presents today for follow-up on her osteoarthritis of her right knee.  We treat her conservatively with periodic cortisone injections.  Her last injection was in September of last year.  She had good relief of her symptoms until recently.  She is pleased with the amount of relief that she is getting from these injections.    Past Medical History:    DJD (degenerative joint disease) of knee    HTN (hypertension), benign    Morbid obesity with BMI of 40.0-44.9, adult ([facility])     Past Surgical History:   Procedure Laterality Date          Knee replacement surgery      Tonsillectomy       Current Outpatient Medications   Medication Sig Dispense Refill    ZOLPIDEM 10 MG Oral Tab Take 1 tablet (10 mg total) by mouth nightly. 30 tablet 0    fluconazole (DIFLUCAN) 150 MG Oral Tab Take 1 tablet (150 mg total) by mouth every other day. 2 tablet 0    tiZANidine 4 MG Oral Tab Take 0.5 tablets (2 mg total) by mouth every 8 (eight) hours as needed. 40 tablet 0    triamcinolone 0.1 % External Ointment Apply 1 g topically 2 (two) times daily. As needed to effected area (rash) 45 g 0    terbinafine 250 MG Oral Tab Take 1 tablet (250 mg total) by mouth daily. 7 tablet 0    folic acid 1 MG Oral Tab Take 5 tablets (5 mg total) by mouth daily. 150 tablet 1    chlorthalidone 25 MG Oral Tab Take 1 tablet (25 mg total) by mouth daily. For high blood pressure or edema 90 tablet 3    celecoxib 200 MG Oral Cap Take 1 capsule (200 mg total) by mouth daily. 30 capsule 0    topiramate 25 MG Oral Tab Take 1 tablet (25 mg total) by mouth 2 (two) times daily. 120 tablet 0    Phentermine HCl 30 MG Oral Cap Take 1 capsule (30 mg total) by mouth every morning. 30 capsule 5    semaglutide 4 MG/3ML  Subcutaneous Solution Pen-injector 20 clicks a week 1 each 12    montelukast (SINGULAIR) 10 MG Oral Tab Take 1 tablet (10 mg total) by mouth daily. 90 tablet 3    escitalopram 20 MG Oral Tab Take 1 tablet (20 mg total) by mouth daily. 90 tablet 3    buPROPion  MG Oral Tablet 24 Hr Take 1 tablet (150 mg total) by mouth daily. 90 tablet 3    miSOPROStol (CYTOTEC) 200 MCG Oral Tab Take 1 tablet the night before your endometrial biopsy (Patient not taking: Reported on 5/2/2024) 1 tablet 0     Allergies[1]  History reviewed. No pertinent family history.    Social History     Occupational History    Not on file   Tobacco Use    Smoking status: Never    Smokeless tobacco: Never   Vaping Use    Vaping status: Never Used   Substance and Sexual Activity    Alcohol use: Yes     Comment: socially    Drug use: No    Sexual activity: Not on file        Review of Systems:  GENERAL: feels generally well, no significant weight loss or weight gain  SKIN: no ulcerated or worrisome skin lesions  EYES:denies blurred vision or double vision  HEENT: denies new nasal congestion, sinus pain or ST  LUNGS: denies shortness of breath  CARDIOVASCULAR: denies chest pain  GI: no hematemesis, no worsening heartburn, no diarrhea  : no dysuria, no blood in urine, no difficulty urinating, no incontinence  MUSCULOSKELETAL: no other musculoskeletal complaints other than in HPI  NEURO: no numbness or tingling, no weakness or balance disorder  PSYCHE: no depression or anxiety  HEMATOLOGIC: no hx of blood dyscrasia  ENDOCRINE: no thyroid or diabetes issues  ALL/ASTHMA: no new hx of severe allergy or asthma    Physical Examination:    Ht 5' 5\" (1.651 m)   Wt 280 lb 6.4 oz (127.2 kg)   BMI 46.66 kg/m²   Constitutional: appears well hydrated, alert and responsive, no acute distress noted  Extremities: Tenderness to palpation.  Painful range of motion of the knee.  No palpable effusion.  No redness or warmth.      Imaging: No results found.     Lab  Results   Component Value Date    WBC 7.2 09/18/2024    HGB 14.2 09/18/2024    .0 09/18/2024      Lab Results   Component Value Date    GLU 94 09/18/2024    BUN 16 09/18/2024    CREATSERUM 1.04 (H) 09/18/2024    GFRNAA 65 07/05/2022    GFRAA 75 07/05/2022        Assessment and Plan:  Diagnoses and all orders for this visit:    Primary osteoarthritis of right knee  -     triamcinolone acetonide (Kenalog-40) 40 MG/ML injection 40 mg          Plan: Esther has an exacerbation of her osteoarthritis in her right knee.  We treat her conservatively with periodic injections.  I prepped the right knee with Betadine and injected the knee with local anesthetic and 40 mg of Kenalog.  She tolerated the procedure well.  Discussed treatment options moving forward.  She will follow-up with us as needed.    The above note was creating using Dragon speech recognition technology. Please excuse any typos.    This visit was performed under the supervision of Dr. Checo Britt who formulated the treatment plan and decision making.        [1] No Known Allergies

## 2025-02-05 NOTE — PROGRESS NOTES
Per verbal order from Dr. Britt, draw up and 4ml of 0.5% Marcaine and 1ml of Kenalog 40 for injection into right knee.Kaya BRIDGES MA  Patient provided education handout for cortisone injection.

## 2025-02-18 RX ORDER — TOPIRAMATE 25 MG/1
25 TABLET, FILM COATED ORAL 2 TIMES DAILY
Qty: 120 TABLET | Refills: 0 | Status: SHIPPED | OUTPATIENT
Start: 2025-02-18

## 2025-02-26 DIAGNOSIS — G47.00 INSOMNIA, UNSPECIFIED TYPE: ICD-10-CM

## 2025-03-03 NOTE — TELEPHONE ENCOUNTER
Please Review. Protocol Failed; No Protocol   01/30/2025  LAST WRITTEN: 01/30/2025  Quantity: 30     Requested Prescriptions   Pending Prescriptions Disp Refills    zolpidem 10 MG Oral Tab 30 tablet 0     Sig: Take 1 tablet (10 mg total) by mouth nightly.       Controlled Substance Medication Failed - 3/3/2025 12:13 PM        Failed - This medication is a controlled substance - forward to provider to refill        Passed - Medication is active on med list

## 2025-03-04 ENCOUNTER — TELEPHONE (OUTPATIENT)
Facility: CLINIC | Age: 55
End: 2025-03-04

## 2025-03-04 RX ORDER — ZOLPIDEM TARTRATE 10 MG/1
10 TABLET ORAL NIGHTLY
Qty: 30 TABLET | Refills: 0 | Status: SHIPPED | OUTPATIENT
Start: 2025-03-04 | End: 2026-02-27

## 2025-03-04 NOTE — TELEPHONE ENCOUNTER
1st no show. Dana Reyez. [facility]/RAJANI. 03/04/25. Sent no show letter via [employer].

## 2025-04-11 ENCOUNTER — OFFICE VISIT (OUTPATIENT)
Dept: FAMILY MEDICINE CLINIC | Facility: CLINIC | Age: 55
End: 2025-04-11
Payer: MEDICAID

## 2025-04-11 VITALS
TEMPERATURE: 97 F | HEIGHT: 65 IN | RESPIRATION RATE: 20 BRPM | OXYGEN SATURATION: 97 % | WEIGHT: 268 LBS | SYSTOLIC BLOOD PRESSURE: 127 MMHG | BODY MASS INDEX: 44.65 KG/M2 | HEART RATE: 75 BPM | DIASTOLIC BLOOD PRESSURE: 88 MMHG

## 2025-04-11 DIAGNOSIS — M79.2 NEURALGIA: Primary | ICD-10-CM

## 2025-04-11 DIAGNOSIS — R68.2 DRY MOUTH: ICD-10-CM

## 2025-04-11 PROCEDURE — 99213 OFFICE O/P EST LOW 20 MIN: CPT | Performed by: FAMILY MEDICINE

## 2025-04-11 RX ORDER — GABAPENTIN 100 MG/1
100 CAPSULE ORAL 3 TIMES DAILY
Qty: 90 CAPSULE | Refills: 1 | Status: SHIPPED | OUTPATIENT
Start: 2025-04-11

## 2025-04-11 NOTE — PROGRESS NOTES
Subjective:   Patient ID: Eileen Christianson is a 54 year old female.    HPI  Has dry mouth clearing throat all the time ear pain   No fever no nasal congestion no postnasal drip  History/Other:   Review of Systems    Constitutional: Negative.  Negative for activity change, appetite change, diaphoresis and fatigue.   Heent see hpi   Respiratory: see hpi   Cardiovascular: Negative.  Negative for chest pain, palpitations and leg swelling.   Gastrointestinal: Negative.  Negative for abdominal pain.   Skin: Negative.           Current Medications[1]  Allergies:Allergies[2]    Objective:   Physical Exam  Constitutional:       Appearance: She is well-developed.   HENT:      Right Ear: Tympanic membrane, ear canal and external ear normal.      Left Ear: Tympanic membrane, ear canal and external ear normal.      Nose: No congestion.      Comments: Tongue appears dry     Mouth/Throat:      Pharynx: Posterior oropharyngeal erythema present. No oropharyngeal exudate.   Cardiovascular:      Rate and Rhythm: Normal rate and regular rhythm.      Heart sounds: Normal heart sounds.   Pulmonary:      Effort: Pulmonary effort is normal.      Breath sounds: Normal breath sounds.   Neurological:      Mental Status: She is alert.      Deep Tendon Reflexes: Reflexes are normal and symmetric.         Assessment & Plan:     ICD-10-CM    1. Neuralgia  M79.2    Gabapentin    2. Dry mouth  R68.2    Lemon drops, sugar feree candy increase fluids         No orders of the defined types were placed in this encounter.      Meds This Visit:  Requested Prescriptions     Signed Prescriptions Disp Refills    gabapentin 100 MG Oral Cap 90 capsule 1     Sig: Take 1 capsule (100 mg total) by mouth 3 (three) times daily.       Imaging & Referrals:  None         [1]   Current Outpatient Medications   Medication Sig Dispense Refill    gabapentin 100 MG Oral Cap Take 1 capsule (100 mg total) by mouth 3 (three) times daily. 90 capsule 1    zolpidem 10 MG Oral Tab  Take 1 tablet (10 mg total) by mouth nightly. 30 tablet 0    TOPIRAMATE 25 MG Oral Tab Take 1 tablet (25 mg total) by mouth 2 (two) times daily. 120 tablet 0    fluconazole (DIFLUCAN) 150 MG Oral Tab Take 1 tablet (150 mg total) by mouth every other day. 2 tablet 0    tiZANidine 4 MG Oral Tab Take 0.5 tablets (2 mg total) by mouth every 8 (eight) hours as needed. 40 tablet 0    triamcinolone 0.1 % External Ointment Apply 1 g topically 2 (two) times daily. As needed to effected area (rash) 45 g 0    terbinafine 250 MG Oral Tab Take 1 tablet (250 mg total) by mouth daily. 7 tablet 0    folic acid 1 MG Oral Tab Take 5 tablets (5 mg total) by mouth daily. 150 tablet 1    chlorthalidone 25 MG Oral Tab Take 1 tablet (25 mg total) by mouth daily. For high blood pressure or edema 90 tablet 3    celecoxib 200 MG Oral Cap Take 1 capsule (200 mg total) by mouth daily. 30 capsule 0    Phentermine HCl 30 MG Oral Cap Take 1 capsule (30 mg total) by mouth every morning. 30 capsule 5    montelukast (SINGULAIR) 10 MG Oral Tab Take 1 tablet (10 mg total) by mouth daily. 90 tablet 3    escitalopram 20 MG Oral Tab Take 1 tablet (20 mg total) by mouth daily. 90 tablet 3    buPROPion  MG Oral Tablet 24 Hr Take 1 tablet (150 mg total) by mouth daily. 90 tablet 3    miSOPROStol (CYTOTEC) 200 MCG Oral Tab Take 1 tablet the night before your endometrial biopsy 1 tablet 0   [2] No Known Allergies

## 2025-04-12 DIAGNOSIS — G47.00 INSOMNIA, UNSPECIFIED TYPE: ICD-10-CM

## 2025-04-15 DIAGNOSIS — E66.01 MORBID OBESITY WITH BMI OF 40.0-44.9, ADULT (HCC): ICD-10-CM

## 2025-04-15 DIAGNOSIS — G47.00 INSOMNIA, UNSPECIFIED TYPE: ICD-10-CM

## 2025-04-15 RX ORDER — ZOLPIDEM TARTRATE 10 MG/1
10 TABLET ORAL NIGHTLY
Qty: 30 TABLET | Refills: 0 | Status: CANCELLED | OUTPATIENT
Start: 2025-04-15 | End: 2026-04-10

## 2025-04-15 RX ORDER — ZOLPIDEM TARTRATE 10 MG/1
10 TABLET ORAL NIGHTLY
Qty: 30 TABLET | Refills: 0 | Status: SHIPPED | OUTPATIENT
Start: 2025-04-15 | End: 2026-04-10

## 2025-04-15 NOTE — TELEPHONE ENCOUNTER
Please review; protocol failed/No Protocol      Recent Fills: 12/30/2024, 01/30/2025, 03/04/2025    Last Rx Written: 03/04/2025    Last Office Visit: 04/11/2025

## 2025-04-16 RX ORDER — PHENTERMINE HYDROCHLORIDE 30 MG/1
30 CAPSULE ORAL EVERY MORNING
Qty: 30 CAPSULE | Refills: 5 | Status: SHIPPED | OUTPATIENT
Start: 2025-04-16

## 2025-04-17 RX ORDER — TOPIRAMATE 25 MG/1
25 TABLET, FILM COATED ORAL 2 TIMES DAILY
Qty: 120 TABLET | Refills: 0 | Status: SHIPPED | OUTPATIENT
Start: 2025-04-17

## 2025-05-14 DIAGNOSIS — G47.00 INSOMNIA, UNSPECIFIED TYPE: ICD-10-CM

## 2025-05-15 DIAGNOSIS — E66.01 MORBID OBESITY WITH BMI OF 40.0-44.9, ADULT (HCC): ICD-10-CM

## 2025-05-15 RX ORDER — ZOLPIDEM TARTRATE 10 MG/1
10 TABLET ORAL NIGHTLY
Qty: 30 TABLET | Refills: 0 | Status: SHIPPED | OUTPATIENT
Start: 2025-05-15 | End: 2026-05-10

## 2025-05-15 NOTE — TELEPHONE ENCOUNTER
Please review.  Protocol failed/has no protocol.    Recent fills each # 30 : 04/16/2025,03/04/2025  Last prescription written: 04/15/2025  Last office visit:  4/11/2025

## 2025-05-21 ENCOUNTER — MED REC SCAN ONLY (OUTPATIENT)
Dept: FAMILY MEDICINE CLINIC | Facility: CLINIC | Age: 55
End: 2025-05-21

## 2025-06-16 DIAGNOSIS — G47.00 INSOMNIA, UNSPECIFIED TYPE: ICD-10-CM

## 2025-06-17 ENCOUNTER — NURSE TRIAGE (OUTPATIENT)
Dept: FAMILY MEDICINE CLINIC | Facility: CLINIC | Age: 55
End: 2025-06-17

## 2025-06-17 RX ORDER — GABAPENTIN 100 MG/1
100 CAPSULE ORAL 3 TIMES DAILY
Qty: 270 CAPSULE | Refills: 1 | Status: SHIPPED | OUTPATIENT
Start: 2025-06-17

## 2025-06-17 NOTE — TELEPHONE ENCOUNTER
Action Requested: Summary for Provider     []  Critical Lab, Recommendations Needed  [] Need Additional Advice  []   FYI    []   Need Orders  [] Need Medications Sent to Pharmacy  []  Other     SUMMARY: states she noticed a bump on the side of her face two weeks ago and it has been getting bigger every day. It is sore to the touch, a little pink. There is no drainage or head to it. Scheduled appointment.     Reason for call: Bump  Onset: two weeks    The patient said she noticed a bump on her face on the right side of her face above her cheek and it has gotten bigger and it hurts. It is not red, it is not oozing. It is hard. She said it is a little pink. She noticed it about two weeks ago. It is painful to the touch, she said the side of a bouncy ball. Scheduled an appointment to be seen.     Future Appointments   Date Time Provider Department Center   6/19/2025  8:10 AM [facility]   6/24/2025  3:00 PM [facility] RM1 [facility] EM [facility]   6/30/2025  2:15 PM Lucas Alba MD [facility]   7/15/2025  1:50 PM [facility]   10/23/2025  8:30 AM Lucas Alba MD [facility]       Reason for Disposition   Patient wants to be seen    Protocols used: Skin Lump or Localized Swelling-A-OH

## 2025-06-17 NOTE — TELEPHONE ENCOUNTER
Refill passed per Clinic protocol.    Short supply given to patient -no follow up instructions from office visit- please advise on refill     Requested Prescriptions   Pending Prescriptions Disp Refills    GABAPENTIN 100 MG Oral Cap [Pharmacy Med Name: Gabapentin 100 Mg Cap Nort] 90 capsule 0     Sig: Take 1 capsule (100 mg total) by mouth 3 (three) times daily.       Neurology Medications Passed - 6/17/2025  9:22 AM        Passed - In person appointment or virtual visit in the past 6 mos or appointment in next 3 mos     Recent Outpatient Visits              [facility]    [facility]    4 months [facility]    Office Visit    5 months ago Morbid (severe) obesity due to excess calories ([facility]    Office Visit    6 months ago HTN (hypertension), benign    [facility] Lucas Alba MD    Office Visit    8 months ago [facility] of right knee    [facility]    [facility]          Future Appointments         Provider Department Appt Notes    In 1 week [address] 1 QA LG    In 1 week Lucas Alba MD [facility] Weight loss    In 4 weeks [facility] There is a growth on the side of my head/face the size of a walnut it’s getting bigger and it hurts.    In 4 months Lucas Alba MD [facility] Weight loss                    Passed - Medication is active on med list

## 2025-06-18 ENCOUNTER — PATIENT MESSAGE (OUTPATIENT)
Dept: FAMILY MEDICINE CLINIC | Facility: CLINIC | Age: 55
End: 2025-06-18

## 2025-06-18 RX ORDER — ZOLPIDEM TARTRATE 10 MG/1
10 TABLET ORAL NIGHTLY
Qty: 30 TABLET | Refills: 0 | Status: SHIPPED | OUTPATIENT
Start: 2025-06-18 | End: 2026-06-13

## 2025-06-18 NOTE — TELEPHONE ENCOUNTER
Please review.  Protocol Failed.    Zolpidem 10mg Recent fills each # 30 : 3/4, 4/16, 5/15  Last prescription written: 5/15/25  Last office visit: 4/11/2025    Future Appointments   Date Time Provider Department Center   6/19/2025  8:10 AM Naa Roy APRN [facility]   7/15/2025  1:50 PM Eve Cain MD [facility]         Requested Prescriptions   Pending Prescriptions Disp Refills    zolpidem 10 MG Oral Tab 30 tablet 0     Sig: Take 1 tablet (10 mg total) by mouth nightly.       Controlled Substance Medication Failed - 6/18/2025  4:47 PM        Failed - This medication is a controlled substance - forward to provider to refill        Passed - Medication is active on med list

## 2025-06-19 ENCOUNTER — NURSE TRIAGE (OUTPATIENT)
Dept: FAMILY MEDICINE CLINIC | Facility: CLINIC | Age: 55
End: 2025-06-19

## 2025-06-19 ENCOUNTER — HOSPITAL ENCOUNTER (EMERGENCY)
Facility: HOSPITAL | Age: 55
Discharge: HOME | End: 2025-06-19
Attending: EMERGENCY MEDICINE
Payer: MEDICAID

## 2025-06-19 ENCOUNTER — OFFICE VISIT (OUTPATIENT)
Dept: FAMILY MEDICINE CLINIC | Facility: CLINIC | Age: 55
End: 2025-06-19
Payer: MEDICAID

## 2025-06-19 VITALS
HEART RATE: 54 BPM | SYSTOLIC BLOOD PRESSURE: 137 MMHG | OXYGEN SATURATION: 97 % | WEIGHT: 268 LBS | HEIGHT: 65 IN | DIASTOLIC BLOOD PRESSURE: 87 MMHG | BODY MASS INDEX: 44.65 KG/M2

## 2025-06-19 VITALS
HEIGHT: 64 IN | WEIGHT: 268 LBS | DIASTOLIC BLOOD PRESSURE: 90 MMHG | TEMPERATURE: 97 F | OXYGEN SATURATION: 98 % | HEART RATE: 60 BPM | SYSTOLIC BLOOD PRESSURE: 142 MMHG | RESPIRATION RATE: 16 BRPM | BODY MASS INDEX: 45.75 KG/M2

## 2025-06-19 DIAGNOSIS — L02.01 ABSCESS OF FACE: Primary | ICD-10-CM

## 2025-06-19 DIAGNOSIS — L72.3 INFECTED SEBACEOUS CYST: Primary | ICD-10-CM

## 2025-06-19 DIAGNOSIS — L08.9 INFECTED SEBACEOUS CYST: Primary | ICD-10-CM

## 2025-06-19 PROCEDURE — 99284 EMERGENCY DEPT VISIT MOD MDM: CPT

## 2025-06-19 PROCEDURE — 99214 OFFICE O/P EST MOD 30 MIN: CPT

## 2025-06-19 PROCEDURE — 99283 EMERGENCY DEPT VISIT LOW MDM: CPT

## 2025-06-19 PROCEDURE — 10061 I&D ABSCESS COMP/MULTIPLE: CPT

## 2025-06-19 RX ORDER — CEPHALEXIN 500 MG/1
500 CAPSULE ORAL 3 TIMES DAILY
Qty: 21 CAPSULE | Refills: 0 | Status: SHIPPED | OUTPATIENT
Start: 2025-06-19

## 2025-06-19 RX ORDER — CEPHALEXIN 500 MG/1
500 CAPSULE ORAL 2 TIMES DAILY
Qty: 14 CAPSULE | Refills: 0 | Status: SHIPPED | OUTPATIENT
Start: 2025-06-19 | End: 2025-06-26

## 2025-06-19 RX ORDER — IBUPROFEN 800 MG/1
800 TABLET, FILM COATED ORAL EVERY 8 HOURS PRN
Qty: 45 TABLET | Refills: 0 | Status: SHIPPED | OUTPATIENT
Start: 2025-06-19

## 2025-06-19 RX ORDER — IBUPROFEN 600 MG/1
600 TABLET, FILM COATED ORAL EVERY 8 HOURS PRN
Qty: 30 TABLET | Refills: 0 | Status: SHIPPED | OUTPATIENT
Start: 2025-06-19 | End: 2025-06-26

## 2025-06-19 NOTE — ED PROVIDER NOTES
Patient Seen in: [facility] Emergency Department    History     Chief Complaint   Patient presents with    Abscess       HPI        History is provided by patient/independent historian: patient  55 year old female with History of hypertension here with complaints of abscess to the right side of the face for the last 2 weeks.  Starting to get painful and patient was sent in for further evaluation.  No fevers, she tried hide back without spontaneous drainage.    History reviewed. Past Medical History[1]      History reviewed. Past Surgical History[2]      Home Medications reviewed :  Prescriptions Prior to Admission[3]      History reviewed. Social Hx on file[4]      ROS  Review of Systems   HENT:  Positive for facial swelling.    Respiratory:  Negative for shortness of breath.    Cardiovascular:  Negative for chest pain.   Skin:  Positive for wound.   All other systems reviewed and are negative.     All other pertinent organ systems are reviewed and are negative.      Physical Exam     ED Triage Vitals [06/19/25 0838]   BP (!) 159/107   Pulse 56   Resp 20   Temp 97.2 °F (36.2 °C)   Temp src Temporal   SpO2 98 %   O2 Device None (Room air)     Vital signs reviewed.      Physical Exam  Vitals and nursing note reviewed.   HENT:      Head:     Cardiovascular:      Pulses: Normal pulses.   Pulmonary:      Effort: No respiratory distress.   Abdominal:      General: There is no distension.   Neurological:      Mental Status: She is alert.             ED Course       Labs:   Labs Reviewed - No data to display      My EKG Interpretation:   As reviewed and Interpreted by me      Imaging Results Available and Reviewed while in ED:   No results found.      Decision rules/scores evaluated: none      Diagnostic labs/tests considered but not ordered: CBC, BMp, aerobic culture, CT face    ED Medications Administered: Medications - No data to display             MDM       Medical Decision Making      Differential Diagnosis:  After obtaining the patient's history, performing the physical exam and reviewing the diagnostics, multiple initial diagnoses were considered based on the presenting problem including abscess, cellulitis, nec fasc    External document review: I personally reviewed available external medical records for any recent pertinent discharge summaries, testing, and procedures - the findings are as follows: today visit with CHEYENNE Roy for facial abscess    Complicating Factors: The patient already  has a past medical history of DJD (degenerative joint disease) of knee, HTN (hypertension), benign, and Morbid obesity with BMI of 40.0-44.9, adult (HCC). to contribute to the complexity of this ED evaluation.    Procedures performed:   PROCEDURE: Simple  Incision & Drainage of Abscess  : William Loja MD  Location:  face    The patient / caregivers were apprised of diagnostic / treatment options including alternate modes of care, in addition to risks and benefits, for this medical condition. Based on this discussion the patient / caregiver agree with this chosen diagnostic and treatment plan and verbal consent was obtained.    Timeout was performed and the correct patient, site, and location was confirmed prior to initiation.  Sterile prep and drape was preformed.  Local analgesia was obtained using pain-ease spray.   An incision was made at the point of greatest fluctuance and sebaceous purulent fluid was obtained.  Loculations disrupted.   no sterile packing was inserted.  No complications were noted.     Patient instructed to follow up with their PMD or return to the ED for wound check in 3 days.      Discussed management with physician/appropriate source: none    Considered admission/deescalation of care for: none    Social determinants of health affecting patient care: none    Prescription medications considered: keflex, prescription strength ibuprofen, discussed continuing current medication regimen    The  patient requires continuous monitoring for: facial abscess    Shared decision making: discussed possible admission      Disposition and Plan     Clinical Impression:  1. Infected sebaceous cyst        Disposition:  Discharge    Follow-up:  Eve Cain MD  [address] [facility] [address] [phone]    Follow up      Nancy Marquez MD  [address] [facility], [address]  [facility] [address]  [phone]    Follow up        Medications Prescribed:  Current Discharge Medication List        START taking these medications    Details   !! cephALEXin 500 MG Oral Cap Take 1 capsule (500 mg total) by mouth 2 (two) times daily for 7 days.  Qty: 14 capsule, Refills: 0      !! ibuprofen 600 MG Oral Tab Take 1 tablet (600 mg total) by mouth every 8 (eight) hours as needed for Pain or Fever.  Qty: 30 tablet, Refills: 0       !! - Potential duplicate medications found. Please discuss with provider.                         [1]   Past Medical History:   DJD (degenerative joint disease) of knee    HTN (hypertension), benign    Morbid obesity with BMI of 40.0-44.9, adult (HCC)   [2]   Past Surgical History:  Procedure Laterality Date          Knee replacement surgery      Tonsillectomy     [3] (Not in a hospital admission)  [4]   Social History  Socioeconomic History    Marital status: Single   Tobacco Use    Smoking status: Never    Smokeless tobacco: Never   Vaping Use    Vaping status: Never Used   Substance and Sexual Activity    Alcohol use: Yes     Comment: socially    Drug use: No

## 2025-06-19 NOTE — PROGRESS NOTES
Subjective:   Eileen Christianson is a 55 year old female who presents for Bump (Pt states she noticed a bump on her face 2 week ago that has been progressing in size and was not painful until 3 days ago. ).     HPI   Patient presents with chief complaint of a  pain full bump  is located in the right side of her face. Onset gradual starting 2 weeks ago. Symptoms have gradually worsened. Care prior to arrival consisted of rest, NSAID, acetaminophen, ice, and heat with no relief.   Abscess has associated symptoms of pain, chills. Patient denies fatigue, fever, cough, sore throat, dental pain, sob, chest pain, palpations, dizziness, headaches, syncope, abdominal pain, nausea or vomiting. Patient does not have previous history of cutaneous abscesses. Patient does not have diabetes.     History/Other:      Chief Complaint Reviewed and Verified  Nursing Notes Reviewed and   Verified  Tobacco Reviewed  Allergies Reviewed  Medications Reviewed    Problem List Reviewed  Medical History Reviewed  Surgical History   Reviewed  OB Status Reviewed  Family History Reviewed  Social History   Reviewed           Tobacco:  She has never smoked tobacco.      Current Medications[1]    Allergies[2]    Review of Systems   Constitutional:  Positive for chills. Negative for activity change, fatigue and fever.   HENT:  Positive for facial swelling. Negative for congestion, ear pain, rhinorrhea and sneezing.    Eyes: Negative.  Negative for redness.   Respiratory: Negative.  Negative for cough, shortness of breath and wheezing.    Cardiovascular: Negative.  Negative for chest pain.   Gastrointestinal: Negative.  Negative for abdominal pain, constipation, diarrhea, nausea and vomiting.   Endocrine: Negative.    Genitourinary: Negative.  Negative for difficulty urinating and frequency.   Musculoskeletal: Negative.  Negative for back pain, joint swelling and myalgias.   Skin: Negative.  Negative for rash.   Allergic/Immunologic: Negative.     Neurological: Negative.  Negative for dizziness, syncope, light-headedness and headaches.   Hematological: Negative.    Psychiatric/Behavioral: Negative.           Objective:   /87 (BP Location: Left arm, Patient Position: Sitting, Cuff Size: large)   Pulse 54   Ht 5' 5\" (1.651 m)   Wt 268 lb (121.6 kg)   SpO2 97%   BMI 44.60 kg/m²  Estimated body mass index is 44.6 kg/m² as calculated from the following:    Height as of this encounter: 5' 5\" (1.651 m).    Weight as of this encounter: 268 lb (121.6 kg).      Physical Exam  Vitals and nursing note reviewed.   Constitutional:       Appearance: Normal appearance. She is normal weight.   HENT:      Head: Normocephalic and atraumatic.        Comments: abscess     Right Ear: Tympanic membrane normal.      Left Ear: Tympanic membrane normal.      Nose: Nose normal.      Mouth/Throat:      Mouth: Mucous membranes are moist.      Pharynx: Oropharynx is clear.   Eyes:      Extraocular Movements: Extraocular movements intact.      Conjunctiva/sclera: Conjunctivae normal.      Pupils: Pupils are equal, round, and reactive to light.   Cardiovascular:      Rate and Rhythm: Normal rate and regular rhythm.      Pulses: Normal pulses.      Heart sounds: Normal heart sounds.   Pulmonary:      Effort: Pulmonary effort is normal.      Breath sounds: Normal breath sounds.   Abdominal:      General: Abdomen is flat. Bowel sounds are normal.      Palpations: Abdomen is soft.   Musculoskeletal:         General: Normal range of motion.      Cervical back: Normal range of motion and neck supple.   Skin:     General: Skin is warm and dry.   Neurological:      General: No focal deficit present.      Mental Status: She is alert and oriented to person, place, and time. Mental status is at baseline.   Psychiatric:         Mood and Affect: Mood normal.         Behavior: Behavior normal.         Thought Content: Thought content normal.         Judgment: Judgment normal.            Assessment & Plan:     Assessment & Plan  Abscess of face  -Patient advised to take medication as directed  -Moist warm compresses for 10-15 mins 3-4 times a day  -Keep hands away from the area  -Educated about using ibuprofen/ tylenol for breakthrough pain  -Advised to keep clothing/hair/jewelry away from the area    Orders:    cephALEXin 500 MG Oral Cap; Take 1 capsule (500 mg total) by mouth 3 (three) times daily.    Surgery Referral - In Network    ibuprofen 800 MG Oral Tab; Take 1 tablet (800 mg total) by mouth every 8 (eight) hours as needed for Pain.      Medication use, effects and side effects discussed in detail with patient. The patient indicated understanding of the diagnosis and agreed with the plan of care.    Return if symptoms worsen or fail to improve.    CHEYENNE Rivas       [1]   Current Outpatient Medications   Medication Sig Dispense Refill    cephALEXin 500 MG Oral Cap Take 1 capsule (500 mg total) by mouth 3 (three) times daily. 21 capsule 0    ibuprofen 800 MG Oral Tab Take 1 tablet (800 mg total) by mouth every 8 (eight) hours as needed for Pain. 45 tablet 0    zolpidem 10 MG Oral Tab Take 1 tablet (10 mg total) by mouth nightly. 30 tablet 0    gabapentin 100 MG Oral Cap Take 1 capsule (100 mg total) by mouth 3 (three) times daily. 270 capsule 1    semaglutide 4 MG/3ML Subcutaneous Solution Pen-injector 20 clicks a week 1 each 12    TOPIRAMATE 25 MG Oral Tab Take 1 tablet (25 mg total) by mouth 2 (two) times daily. 120 tablet 0    Phentermine HCl 30 MG Oral Cap Take 1 capsule (30 mg total) by mouth every morning. 30 capsule 5    fluconazole (DIFLUCAN) 150 MG Oral Tab Take 1 tablet (150 mg total) by mouth every other day. 2 tablet 0    tiZANidine 4 MG Oral Tab Take 0.5 tablets (2 mg total) by mouth every 8 (eight) hours as needed. 40 tablet 0    triamcinolone 0.1 % External Ointment Apply 1 g topically 2 (two) times daily. As needed to effected area (rash) 45 g 0     terbinafine 250 MG Oral Tab Take 1 tablet (250 mg total) by mouth daily. 7 tablet 0    folic acid 1 MG Oral Tab Take 5 tablets (5 mg total) by mouth daily. 150 tablet 1    chlorthalidone 25 MG Oral Tab Take 1 tablet (25 mg total) by mouth daily. For high blood pressure or edema 90 tablet 3    celecoxib 200 MG Oral Cap Take 1 capsule (200 mg total) by mouth daily. 30 capsule 0    miSOPROStol (CYTOTEC) 200 MCG Oral Tab Take 1 tablet the night before your endometrial biopsy 1 tablet 0    buPROPion  MG Oral Tablet 24 Hr Take 1 tablet (150 mg total) by mouth daily. (Patient not taking: Reported on 6/19/2025) 90 tablet 3   [2] No Known Allergies

## 2025-06-19 NOTE — TELEPHONE ENCOUNTER
Outpatient Medication Detail     Disp Refills Start End    zolpidem 10 MG Oral Tab 30 tablet 0 6/18/2025 6/13/2026    Sig - Route: Take 1 tablet (10 mg total) by mouth nightly. - Oral    Sent to pharmacy as: Zolpidem Tartrate 10 MG Oral Tablet (Ambien)    E-Prescribing Status: Receipt confirmed by pharmacy (6/18/2025  6:55 PM CDT)      Associated Diagnoses    Insomnia, unspecified type        Pharmacy    [id number] - [place], [place] - [address] [phone], [phone]

## 2025-06-19 NOTE — ED INITIAL ASSESSMENT (HPI)
Pt notes abscess to ride side of face x2 weeks, increasing in size and pain. Denies drainage or fevers.

## 2025-06-19 NOTE — TELEPHONE ENCOUNTER
Action Requested: Summary for Provider     []  Critical Lab, Recommendations Needed  [] Need Additional Advice  [x]   FYI    []   Need Orders  [] Need Medications Sent to Pharmacy  []  Other     SUMMARY: Per protocol disposition advised: Go to ED now. Patient states she is not alone. Will have family member bring her back to the emergency department now.    Naa and Dr. Cain, FYI  Triage RN, ED follow up 6/20/25    Reason for call: Allergic Rxn Allergies and Swelling (lip)  Onset: 45 minutes ago    Went to ED today and was prescribed antibiotic  Took first capsule 45 minutes ago and developed a reaction--  Hives: large raised circles on neck  Lips are swollen, have doubled in sized   Denies: trouble breathing, chest tightness, and protocol questions marked with \"no\"    Medication Detail  Medication Quantity Refills Start End   cephALEXin 500 MG Oral Cap 14 capsule 0 6/19/2025 6/26/2025   Sig:   Take 1 capsule (500 mg total) by mouth 2 (two) times daily for 7 days.       Reason for Disposition   Widespread hives, itching or facial swelling and onset < 2 hours of exposure to high-risk allergen (e.g., sting, nuts, 1st dose of antibiotic)    Protocols used: [id number]-[facility]

## 2025-06-20 NOTE — TELEPHONE ENCOUNTER
Encounter below is from initial ED visit.  Per chart review, patient did not return to ED as advised.    Left message to call back and transfer to triage as it appears she did not return to ED as advised. Would like to discuss symptoms and need for change in treatment since she had allergic reaction to prescribed antibiotic. Office phone number and hours provided.     [employer] message sent.

## 2025-06-23 NOTE — TELEPHONE ENCOUNTER
Naa Roy, please advise    Last seen on 06/19 by you. Patient was put on cephalexin at the office visit. Then patient went to the ED and the dose was changed. Then patient called the triage RN line to report symptoms allergic reaction and was instructed to return back to the ER.     No repeat ER visit was noted so RN did follow up. Patient responded in a [employer] message that she took a benadryl and symptoms improved. Patient is asking for a new antibiotic?     Please advise on next steps. Patient was asked to call the RN triage line for further assistance but has not yet.     Should Cephalexin be added to allergy list?

## 2025-06-25 DIAGNOSIS — E66.01 MORBID OBESITY WITH BMI OF 40.0-44.9, ADULT (HCC): ICD-10-CM

## 2025-06-27 ENCOUNTER — OFFICE VISIT (OUTPATIENT)
Dept: FAMILY MEDICINE CLINIC | Facility: CLINIC | Age: 55
End: 2025-06-27

## 2025-06-27 ENCOUNTER — TELEPHONE (OUTPATIENT)
Dept: FAMILY MEDICINE CLINIC | Facility: CLINIC | Age: 55
End: 2025-06-27

## 2025-06-27 VITALS
WEIGHT: 261 LBS | HEART RATE: 66 BPM | HEIGHT: 64 IN | OXYGEN SATURATION: 96 % | TEMPERATURE: 97 F | BODY MASS INDEX: 44.56 KG/M2 | SYSTOLIC BLOOD PRESSURE: 133 MMHG | RESPIRATION RATE: 18 BRPM | DIASTOLIC BLOOD PRESSURE: 86 MMHG

## 2025-06-27 DIAGNOSIS — L02.01 ABSCESS OF FACE: Primary | ICD-10-CM

## 2025-06-27 PROCEDURE — 99213 OFFICE O/P EST LOW 20 MIN: CPT | Performed by: FAMILY MEDICINE

## 2025-06-27 NOTE — TELEPHONE ENCOUNTER
Called the patient to inform her that her appointment was cancelled today due to a family emergency. The patient stated she was already at the office, 20 minutes early for her appointment and drove all the way from [place]. Offered apologies to the patient, she stated she was not informed of her appointment being cancelled, again apologized and informed her I would reschedule her but her appointment would not be until 11:20. The patients  began to yell in the background, stating he was not leaving until she was seen and he was going into the building, yelling and swearing in the background. The patient continued to tell me to book the appointment for 11:20 and she will go in the office now and someone will see her now (9:40 AM) informed her there would be no one available to see her - the patient continued to yell and saying to book the appointment and I attempted to deescalate the situation and the patient continued to yell over my voice, ended the call.     Called the office staff to make them aware.

## 2025-06-27 NOTE — TELEPHONE ENCOUNTER
Patient has an appointment scheduled with Karen Roy, however provider will be out of office. Patient needs to be seen due to symptoms of face swelling and hives.

## 2025-06-28 NOTE — PROGRESS NOTES
Subjective:   Eileen Christianson is a 55 year old female who presents for Follow - Up (Cyst follow up )       History/Other:   History of Present Illness  Eileen Christianson is a 55 year old female who presents with a recurrent cyst on the right side of her face.    She had an incision and drainage of a cyst on the right side of her face at the hospital last Thursday. Despite the procedure, the cyst remained large, and she experienced swelling and hives from the initial antibiotic prescribed, which she could not continue.    She was subsequently prescribed doxycycline on Tuesday, which she has been taking twice a day.    Yesterday morning, the cyst burst, releasing a significant amount of brown pus. She has been cleaning the area as much as possible and experiences pain when pressure is applied to the area.   Chief Complaint Reviewed and Verified  Nursing Notes Reviewed and   Verified  Tobacco Reviewed  Allergies Reviewed  Medications Reviewed    Medical History Reviewed  Surgical History Reviewed  OB Status Reviewed    Family History Reviewed  Social History Reviewed         Tobacco:  She has never smoked tobacco.    Current Medications[1]           Review of Systems:  Review of Systems       Objective:   /86   Pulse 66   Temp 97 °F (36.1 °C) (Temporal)   Resp 18   Ht 5' 4\" (1.626 m)   Wt 261 lb (118.4 kg)   SpO2 96%   BMI 44.80 kg/m²  Estimated body mass index is 44.8 kg/m² as calculated from the following:    Height as of this encounter: 5' 4\" (1.626 m).    Weight as of this encounter: 261 lb (118.4 kg).  Physical Exam   Results  Procedure: Incision and drainage of facial cyst  Description: Incised the cyst, expressed purulent material (brown and white pus), cleansed the area with alcohol, and applied gauze under the bandage to absorb drainage.     Physical Exam  HEENT: Right facial cyst with minimal drainage and tenderness to palpation.      Assessment & Plan:   Assessment & Plan       Assessment &  Plan  Facial Cyst with Secondary Infection  Cyst on right face drained, secondary infection present. Initial antibiotic caused allergic reaction, switched to doxycycline. Cyst burst, releasing pus. Current treatment expected to resolve infection. Dermatology referral made.  - Continue doxycycline for at least ten days.  - Cover cyst with gauze and tape to absorb drainage.  - Allow cyst to get wet during showering, pat dry afterwards.  - Follow up in one week with any available provider to assess resolution.        No follow-ups on file.        Raphael Bass DO, 6/28/2025, 7:58 AM           [1]   Current Outpatient Medications   Medication Sig Dispense Refill    semaglutide 4 MG/3ML Subcutaneous Solution Pen-injector 20 clicks a week 1 each 12    Doxycycline Hyclate 100 MG Oral Tab Take 1 tablet (100 mg total) by mouth 2 (two) times daily for 7 days. 14 tablet 0    cephALEXin 500 MG Oral Cap Take 1 capsule (500 mg total) by mouth 3 (three) times daily. 21 capsule 0    ibuprofen 800 MG Oral Tab Take 1 tablet (800 mg total) by mouth every 8 (eight) hours as needed for Pain. 45 tablet 0    zolpidem 10 MG Oral Tab Take 1 tablet (10 mg total) by mouth nightly. 30 tablet 0    gabapentin 100 MG Oral Cap Take 1 capsule (100 mg total) by mouth 3 (three) times daily. 270 capsule 1    TOPIRAMATE 25 MG Oral Tab Take 1 tablet (25 mg total) by mouth 2 (two) times daily. 120 tablet 0    Phentermine HCl 30 MG Oral Cap Take 1 capsule (30 mg total) by mouth every morning. 30 capsule 5    fluconazole (DIFLUCAN) 150 MG Oral Tab Take 1 tablet (150 mg total) by mouth every other day. 2 tablet 0    tiZANidine 4 MG Oral Tab Take 0.5 tablets (2 mg total) by mouth every 8 (eight) hours as needed. 40 tablet 0    triamcinolone 0.1 % External Ointment Apply 1 g topically 2 (two) times daily. As needed to effected area (rash) 45 g 0    terbinafine 250 MG Oral Tab Take 1 tablet (250 mg total) by mouth daily. 7 tablet 0    folic acid 1 MG Oral  Tab Take 5 tablets (5 mg total) by mouth daily. 150 tablet 1    chlorthalidone 25 MG Oral Tab Take 1 tablet (25 mg total) by mouth daily. For high blood pressure or edema 90 tablet 3    celecoxib 200 MG Oral Cap Take 1 capsule (200 mg total) by mouth daily. 30 capsule 0    miSOPROStol (CYTOTEC) 200 MCG Oral Tab Take 1 tablet the night before your endometrial biopsy 1 tablet 0    buPROPion  MG Oral Tablet 24 Hr Take 1 tablet (150 mg total) by mouth daily. (Patient not taking: Reported on 6/27/2025) 90 tablet 3

## 2025-06-30 RX ORDER — TOPIRAMATE 25 MG/1
25 TABLET, FILM COATED ORAL 2 TIMES DAILY
Qty: 120 TABLET | Refills: 0 | Status: SHIPPED | OUTPATIENT
Start: 2025-06-30

## 2025-07-09 ENCOUNTER — TELEPHONE (OUTPATIENT)
Dept: FAMILY MEDICINE CLINIC | Facility: CLINIC | Age: 55
End: 2025-07-09

## 2025-07-29 DIAGNOSIS — L02.01 ABSCESS OF FACE: ICD-10-CM

## 2025-07-29 DIAGNOSIS — G47.00 INSOMNIA, UNSPECIFIED TYPE: ICD-10-CM

## 2025-07-31 RX ORDER — IBUPROFEN 800 MG/1
800 TABLET, FILM COATED ORAL EVERY 8 HOURS PRN
Qty: 45 TABLET | Refills: 0 | Status: SHIPPED | OUTPATIENT
Start: 2025-07-31

## 2025-08-01 RX ORDER — ZOLPIDEM TARTRATE 10 MG/1
10 TABLET ORAL NIGHTLY
Qty: 30 TABLET | Refills: 0 | Status: SHIPPED | OUTPATIENT
Start: 2025-08-01 | End: 2026-07-27

## (undated) NOTE — LETTER
AUTHORIZATION FOR SURGICAL OPERATION OR OTHER PROCEDURE    1.  I hereby authorize Dr. Kasie Duvall, and [facility] staff assigned to my case to perform the following operation and/or procedure at the [facility]:    ____________________Right knee c Patient Name:  ______________________________________________________  (please print)      Patient signature:  ___________________________________________________             Relationship to Patient:           []  Parent    Responsible person

## (undated) NOTE — LETTER
01/21/21        Eileen Christianson  [address]      Dear Fatuma Wooten,    Our records indicate that you have outstanding lab work and or testing that was ordered for you and has not yet been completed:  Orders Placed This Encounter      CT [facility]U

## (undated) NOTE — LETTER
AUTHORIZATION FOR SURGICAL OPERATION OR OTHER PROCEDURE    1. I hereby authorize Dr. Chito Hines, and [facility] staff assigned to my case to perform the following operation and/or procedure at the [facility]:    _______________________________________________________________________________________________    Right Knee Cortisone Injection  _______________________________________________________________________________________________    2. My physician has explained the nature and purpose of the operation or other procedure, possible alternative methods of treatment, the risks involved, and the possibility of complication to me. I acknowledge that no guarantee has been made as to the result that may be obtained. 3.  I recognize that, during the course of this operation, or other procedure, unforseen conditions may necessitate additional or different procedure than those listed above. I, therefore, further authorize and request that the above named physician, his/her physician assistants or designees perform such procedures as are, in his/her professional opinion, necessary and desirable. 4.  Any tissue or organs removed in the operation or other procedure may be disposed of by and at the discretion of the [facility] and [facility] AT [facility]. 5.  I understand that in the event of a medical emergency, I will be transported by local paramedics to [facility] or other hospital emergency department. 6.  I certify that I have read and fully understand the above consent to operation and/or other procedure. 7.  I acknowledge that my physician has explained sedation/analgesia administration to me including the risks and benefits. I consent to the administration of sedation/analgesia as may be necessary or desirable in the judgement of my physician.     Witness signature: ___________________________________________________ Date:  ______/______/_____                    Time: ________ A. M.  P.M. Patient Name:  ______________________________________________________  (please print)      Patient signature:  ___________________________________________________             Relationship to Patient:           []  Parent    Responsible person                          []  Spouse  In case of minor or                    [] Other  _____________   Incompetent name:  __________________________________________________                               (please print)      _____________      Responsible person  In case of minor or  Incompetent signature:  _______________________________________________    Statement of Physician  My signature below affirms that prior to the time of the procedure, I have explained to the patient and/or his/her guardian, the risks and benefits involved in the proposed treatment and any reasonable alternative to the proposed treatment. I have also explained the risks and benefits involved in the refusal of the proposed treatment and have answered the patient's questions.                         Date:  ______/______/_______  Provider                      Signature:  __________________________________________________________       Time:  ___________ A.M    P.M.

## (undated) NOTE — LETTER
12/03/18        Eileen Brown      Dear Yecenia Neves records indicate that you have outstanding lab work and or testing that was ordered for you and has not yet been completed:  Orders Placed This Encounter      PILAR

## (undated) NOTE — LETTER
11/24/2020          To Whom It May Concern:    Lila Cedillo is currently under my medical care and may not return to work at this time. Please excuse Eileen until next evaluation on 12/04/2020  If you require additional information please contact our office.

## (undated) NOTE — LETTER
07/22/21        Eileen Christianson  [address]      Dear Dipti Padilla,    Our records indicate that you have outstanding lab work and or testing that was ordered for you and has not yet been completed:  Orders Placed This Encounter      CT [facility]U

## (undated) NOTE — Clinical Note
Thanks for the referral  She is a soda drinker (4+ regular soda cans a day) Eats out at least twice a day Junk food grazer Will start Topiramate to help get rid of her soda cravings.  Should not mess with her bp  Thanks Palak Maurice

## (undated) NOTE — LETTER
AUTHORIZATION FOR SURGICAL OPERATION OR OTHER PROCEDURE    1.  I hereby [facility] staff assigned to my case to perform the following operation and/or procedure at the [facility]:    ____________________________ Patient Name:  ______________________________________________________  (please print)      Patient signature:  ___________________________________________________             Relationship to Patient:           []  Parent    Responsible person

## (undated) NOTE — LETTER
AUTHORIZATION FOR SURGICAL OPERATION OR OTHER PROCEDURE    1. I hereby authorize Meño Garcia and [facility] [facility] staff assigned to my case to perform the following operation and/or procedure at the [facility]:    _______________________________________________________________________________________________    Cortisone injection Right knee  _______________________________________________________________________________________________    2. My physician has explained the nature and purpose of the operation or other procedure, possible alternative methods of treatment, the risks involved, and the possibility of complication to me. I acknowledge that no guarantee has been made as to the result that may be obtained. 3.  I recognize that, during the course of this operation, or other procedure, unforseen conditions may necessitate additional or different procedure than those listed above. I, therefore, further authorize and request that the above named physician, his/her physician assistants or designees perform such procedures as are, in his/her professional opinion, necessary and desirable. 4.  Any tissue or organs removed in the operation or other procedure may be disposed of by and at the discretion of the [facility] [facility] and [facility] AT [facility]. 5.  I understand that in the event of a medical emergency, I will be transported by local paramedics to [facility] or other [facility] emergency department. 6.  I certify that I have read and fully understand the above consent to operation and/or other procedure. 7.  I acknowledge that my physician has explained sedation/analgesia administration to me including the risks and benefits. I consent to the administration of sedation/analgesia as may be necessary or desirable in the judgement of my physician.     Witness signature: ___________________________________________________ Date:  ______/______/_____ Time:  ________ A. M.  P.M. Patient Name:  ______________________________________________________  (please print)      Patient signature:  ___________________________________________________             Relationship to Patient:           []  Parent    Responsible person                          []  Spouse  In case of minor or                    [] Other  _____________   Incompetent name:  __________________________________________________                               (please print)      _____________      Responsible person  In case of minor or  Incompetent signature:  _______________________________________________    Statement of Physician  My signature below affirms that prior to the time of the procedure, I have explained to the patient and/or his/her guardian, the risks and benefits involved in the proposed treatment and any reasonable alternative to the proposed treatment. I have also explained the risks and benefits involved in the refusal of the proposed treatment and have answered the patient's questions.                         Date:  ______/______/_______  Provider                      Signature:  __________________________________________________________       Time:  ___________ A.M    P.M.

## (undated) NOTE — LETTER
AUTHORIZATION FOR SURGICAL OPERATION OR OTHER PROCEDURE    1. I hereby authorize Dr. NICK PHYSICIAN:[id number]}, and [facility] staff assigned to my case to perform the following operation and/or procedure at the [facility] site:    _______________________________________________________________________________________________      _______________________________________________________________________________________________    2.  My physician has explained the nature and purpose of the operation or other procedure, possible alternative methods of treatment, the risks involved, and the possibility of complication to me.  I acknowledge that no guarantee has been made as to the result that may be obtained.  3.  I recognize that, during the course of this operation, or other procedure, unforseen conditions may necessitate additional or different procedure than those listed above.  I, therefore, further authorize and request that the above named physician, his/her physician assistants or designees perform such procedures as are, in his/her professional opinion, necessary and desirable.  4.  Any tissue or organs removed in the operation or other procedure may be disposed of by and at the discretion of the [facility] and [facility].  5.  I understand that in the event of a medical emergency, I will be transported by local paramedics to [facility] or other hospital emergency department.  6.  I certify that I have read and fully understand the above consent to operation and/or other procedure.    7.  I acknowledge that my physician has explained sedation/analgesia administration to me including the risks and benefits.  I consent to the administration of sedation/analgesia as may be necessary or desirable in the judgement of my physician.    Witness signature: ___________________________________________________ Date:  ______/______/_____                    Time:   ________ A.M.  P.M.       Patient Name:  ______________________________________________________  (please print)      Patient signature:  ___________________________________________________             Relationship to Patient:           []  Parent    Responsible person                          []  Spouse  In case of minor or                    [] Other  _____________   Incompetent name:  __________________________________________________                               (please print)      _____________      Responsible person  In case of minor or  Incompetent signature:  _______________________________________________    Statement of Physician  My signature below affirms that prior to the time of the procedure, I have explained to the patient and/or his/her guardian, the risks and benefits involved in the proposed treatment and any reasonable alternative to the proposed treatment.  I have also explained the risks and benefits involved in the refusal of the proposed treatment and have answered the patient's questions.                        Date:  ______/______/_______  Provider                      Signature:  __________________________________________________________       Time:  ___________ A.M    P.M.

## (undated) NOTE — LETTER
AUTHORIZATION FOR SURGICAL OPERATION OR OTHER PROCEDURE    1. I hereby authorize Dr. Britt,and [facility] staff assigned to my case to perform the following operation and/or procedure at the [facility]:    _______________________________________________________________________________________________    Cortisone Injection Right Knee  _______________________________________________________________________________________________    2.  My physician has explained the nature and purpose of the operation or other procedure, possible alternative methods of treatment, the risks involved, and the possibility of complication to me.  I acknowledge that no guarantee has been made as to the result that may be obtained.  3.  I recognize that, during the course of this operation, or other procedure, unforseen conditions may necessitate additional or different procedure than those listed above.  I, therefore, further authorize and request that the above named physician, his/her physician assistants or designees perform such procedures as are, in his/her professional opinion, necessary and desirable.  4.  Any tissue or organs removed in the operation or other procedure may be disposed of by and at the discretion of the [facility] and [facility].  5.  I understand that in the event of a medical emergency, I will be transported by local paramedics to [facility] or other hospital emergency department.  6.  I certify that I have read and fully understand the above consent to operation and/or other procedure.    7.  I acknowledge that my physician has explained sedation/analgesia administration to me including the risks and benefits.  I consent to the administration of sedation/analgesia as may be necessary or desirable in the judgement of my physician.    Witness signature: ___________________________________________________ Date:  ______/______/_____                    Time:   ________ A.M.  P.M.       Patient Name:  ______________________________________________________  (please print)      Patient signature:  ___________________________________________________             Relationship to Patient:           []  Parent    Responsible person                          []  Spouse  In case of minor or                    [] Other  _____________   Incompetent name:  __________________________________________________                               (please print)      _____________      Responsible person  In case of minor or  Incompetent signature:  _______________________________________________    Statement of Physician  My signature below affirms that prior to the time of the procedure, I have explained to the patient and/or his/her guardian, the risks and benefits involved in the proposed treatment and any reasonable alternative to the proposed treatment.  I have also explained the risks and benefits involved in the refusal of the proposed treatment and have answered the patient's questions.                        Date:  ______/______/_______  Provider                      Signature:  __________________________________________________________       Time:  ___________ A.M    P.M.

## (undated) NOTE — LETTER
MINOR CASE LETTER      11/17/2023        Dear Claudette Cheney,    Your are having a Dilation and Curettage, Hysteroscopy, Truclear Curettage on Wednesday,12/13/2023 at 1:00pm at [facility].    Do not eat or drink anything (including water) after midnight the night before surgery. If your procedure is scheduled later in the day, then nothing by mouth for 6 hours before arrival to the hospital.    Duane Newell are to call this office if you have any cold or flu symptoms 2 days before your scheduled surgery. Please avoid ALL aspirin and herbal supplements 7 days before surgery. Avoid Ibuprofen, Motrin, Aleve, or Naprosyn for 3 days before surgery. Please avoid ALL Cannabis use 24 hours prior to surgery. You cannot wear hair pins,wigs,artificial nail or metallic nails/ nail polish for surgery. You will be contacted by PreAdmission Testing (PAT) usually within the week before surgery. They will take a short medical history and let you know if any preoperative testing is needed. If you have any questions for preadmission testing please feel free to contact them by calling [phone]. I contacted your insurance and was advised no prior authorization is needed for surgery. Please make arrangements for someone to drive you home after your surgery. Call our office now to schedule your post-operative appointment for 2 weeks after surgery. Please feel free to contact our office at [phone] if you have any questions regarding these instructions or your procedure.       Sincerely,          Scott Em, DO [facility], [facility] - OB/GYN  [address]  [facility] [id number] [phone]

## (undated) NOTE — LETTER
AUTHORIZATION FOR SURGICAL OPERATION OR OTHER PROCEDURE    1. I hereby authorize Dr. Breanna Byers, and [place] [email or web address] [facility] staff assigned to my case to perform the following operation and/or procedure at the [facility], [facility]:    ________________________________________Right knee cortisone injection  _______________________________________________________      _______________________________________________________________________________________________    2. My physician has explained the nature and purpose of the operation or other procedure, possible alternative methods of treatment, the risks involved, and the possibility of complication to me. I acknowledge that no guarantee has been made as to the result that may be obtained. 3.  I recognize that, during the course of this operation, or other procedure, unforseen conditions may necessitate additional or different procedure than those listed above. I, therefore, further authorize and request that the above named physician, his/her physician assistants or designees perform such procedures as are, in his/her professional opinion, necessary and desirable. 4.  Any tissue or organs removed in the operation or other procedure may be disposed of by and at the discretion of the [facility], [facility] and [facility] AT [facility]. 5.  I understand that in the event of a medical emergency, I will be transported by local paramedics to [facility] or other hospital emergency department. 6.  I certify that I have read and fully understand the above consent to operation and/or other procedure. 7.  I acknowledge that my physician has explained sedation/analgesia administration to me including the risks and benefits. I consent to the administration of sedation/analgesia as may be necessary or desirable in the judgement of my physician.     Witness signature: ___________________________________________________ Date:  ______/______/_____                    Time: ________ A. M.  P.M. Patient Name:  ______________________________________________________  (please print)      Patient signature:  ___________________________________________________             Relationship to Patient:           []  Parent    Responsible person                          []  Spouse  In case of minor or                    [] Other  _____________   Incompetent name:  __________________________________________________                               (please print)      _____________      Responsible person  In case of minor or  Incompetent signature:  _______________________________________________    Statement of Physician  My signature below affirms that prior to the time of the procedure, I have explained to the patient and/or his/her guardian, the risks and benefits involved in the proposed treatment and any reasonable alternative to the proposed treatment. I have also explained the risks and benefits involved in the refusal of the proposed treatment and have answered the patient's questions.                         Date:  ______/______/_______  Provider                      Signature:  __________________________________________________________       Time:  ___________ A.M    P.M.

## (undated) NOTE — LETTER
AUTHORIZATION FOR SURGICAL OPERATION OR OTHER PROCEDURE    1.  I hereby authorize Dr. Martha Spann, and [facility] staff assigned to my case to perform the following operation and/or procedure at the [facility]:    ____________________________ Patient Name:  ______Lea petsch________________________________________________  (please print)      Patient signature:  ___________________________________________________             Relationship to Patient:           []  Parent    Responsible person

## (undated) NOTE — LETTER
AUTHORIZATION FOR SURGICAL OPERATION OR OTHER PROCEDURE    1. I hereby authorize Dr. Whitney Reis PA-C , and [place] [facility] [place] staff assigned to my case to perform the following operation and/or procedure at the [facility]:    Right knee cortisone injection  _______________________________________________________________________________________________      _______________________________________________________________________________________________    2. My physician has explained the nature and purpose of the operation or other procedure, possible alternative methods of treatment, the risks involved, and the possibility of complication to me. I acknowledge that no guarantee has been made as to the result that may be obtained. 3.  I recognize that, during the course of this operation, or other procedure, unforseen conditions may necessitate additional or different procedure than those listed above. I, therefore, further authorize and request that the above named physician, his/her physician assistants or designees perform such procedures as are, in his/her professional opinion, necessary and desirable. 4.  Any tissue or organs removed in the operation or other procedure may be disposed of by and at the discretion of the [facility], [facility] and [facility]. 5.  I understand that in the event of a medical emergency, I will be transported by local paramedics to [facility] or other hospital emergency department. 6.  I certify that I have read and fully understand the above consent to operation and/or other procedure. 7.  I acknowledge that my physician has explained sedation/analgesia administration to me including the risks and benefits. I consent to the administration of sedation/analgesia as may be necessary or desirable in the judgement of my physician.     Witness signature: ___________________________________________________ Date: ______/______/_____                    Time:  ________ A. M.  P.M. Patient Name:  ______________________________________________________  (please print)      Patient signature:  ___________________________________________________             Relationship to Patient:           []  Parent    Responsible person                          []  Spouse  In case of minor or                    [] Other  _____________   Incompetent name:  __________________________________________________                               (please print)      _____________      Responsible person  In case of minor or  Incompetent signature:  _______________________________________________    Statement of Physician  My signature below affirms that prior to the time of the procedure, I have explained to the patient and/or his/her guardian, the risks and benefits involved in the proposed treatment and any reasonable alternative to the proposed treatment. I have also explained the risks and benefits involved in the refusal of the proposed treatment and have answered the patient's questions.                         Date:  ______/______/_______  Provider                      Signature:  __________________________________________________________       Time:  ___________ A.M    P.M.

## (undated) NOTE — LETTER
2/26/2020          To Whom It May Concern:    Andres Hartman is currently under my medical care and may not return to work at this time. Please excuse Eileen for the time off. She may return to work on Monday march 23,2020.       If you require additional info

## (undated) NOTE — LETTER
AUTHORIZATION FOR SURGICAL OPERATION OR OTHER PROCEDURE    1. I hereby authorize Dr. Britt/Ramon Montes and [place] [email or web address] [facility] staff assigned to my case to perform the following operation and/or procedure at the [facility]:    _______________________________________________________________________________________________    Cortisone injection Right knee  _______________________________________________________________________________________________    2. My physician has explained the nature and purpose of the operation or other procedure, possible alternative methods of treatment, the risks involved, and the possibility of complication to me. I acknowledge that no guarantee has been made as to the result that may be obtained. 3.  I recognize that, during the course of this operation, or other procedure, unforseen conditions may necessitate additional or different procedure than those listed above. I, therefore, further authorize and request that the above named physician, his/her physician assistants or designees perform such procedures as are, in his/her professional opinion, necessary and desirable. 4.  Any tissue or organs removed in the operation or other procedure may be disposed of by and at the discretion of the [facility], [facility] and [facility] AT [facility]. 5.  I understand that in the event of a medical emergency, I will be transported by local paramedics to [facility] or other hospital emergency department. 6.  I certify that I have read and fully understand the above consent to operation and/or other procedure. 7.  I acknowledge that my physician has explained sedation/analgesia administration to me including the risks and benefits. I consent to the administration of sedation/analgesia as may be necessary or desirable in the judgement of my physician.     Witness signature: ___________________________________________________ Date: ______/______/_____                    Time:  ________ A. M.  P.M. Patient Name:  ______________________________________________________  (please print)      Patient signature:  ___________________________________________________             Relationship to Patient:           []  Parent    Responsible person                          []  Spouse  In case of minor or                    [] Other  _____________   Incompetent name:  __________________________________________________                               (please print)      _____________      Responsible person  In case of minor or  Incompetent signature:  _______________________________________________    Statement of Physician  My signature below affirms that prior to the time of the procedure, I have explained to the patient and/or his/her guardian, the risks and benefits involved in the proposed treatment and any reasonable alternative to the proposed treatment. I have also explained the risks and benefits involved in the refusal of the proposed treatment and have answered the patient's questions.                         Date:  ______/______/_______  Provider                      Signature:  __________________________________________________________       Time:  ___________ A.M    P.M.

## (undated) NOTE — LETTER
Law Pritchard, [address] [place]       03/22/19        Patient: Verlena Apley   YOB: 1970   Date of Visit: 3/22/2019       Dear  Dr. Benito Guzmán MD,      Thank you for referring Verlena Apley to my practice.   Please find

## (undated) NOTE — LETTER
04/22/21        Eileen Christianson  [address]      Dear Francois Mendoza,    Our records indicate that you have outstanding lab work and or testing that was ordered for you and has not yet been completed:  Orders Placed This Encounter      CT CALCIU

## (undated) NOTE — LETTER
January 5, 2018     [address]      Dear Dominik Turner:    Below are the results from your recent visit:    Resulted Orders   LIPID PANEL   Result Value Ref Range    HDL Cholesterol 69 mg/dL    Cholesterol, Total 172 11 continue your current medication and plan. We recommend that you repeat the above test(s) in 1 year    If you have any questions or concerns, please don't hesitate to call.     Rehana Marrero MD

## (undated) NOTE — LETTER
1/31/2020              Eileen Christianson        [address]         To Whom It May Concern,    Jagdish Geller is currently under my medical care and may not return to work until February 10, 2020.  Please contact my office with any

## (undated) NOTE — LETTER
AUTHORIZATION FOR SURGICAL OPERATION OR OTHER PROCEDURE    1. I hereby authorize Dr. Britt/Ramana Marr and [facility], [facility] staff assigned to my case to perform the following operation and/or procedure at the [facility], [facility]:    _______________________________________________________________________________________________    Cortisone injection right knee  _______________________________________________________________________________________________    2. My physician has explained the nature and purpose of the operation or other procedure, possible alternative methods of treatment, the risks involved, and the possibility of complication to me. I acknowledge that no guarantee has been made as to the result that may be obtained. 3.  I recognize that, during the course of this operation, or other procedure, unforseen conditions may necessitate additional or different procedure than those listed above. I, therefore, further authorize and request that the above named physician, his/her physician assistants or designees perform such procedures as are, in his/her professional opinion, necessary and desirable. 4.  Any tissue or organs removed in the operation or other procedure may be disposed of by and at the discretion of the [facility], [facility] and [facility]. 5.  I understand that in the event of a medical emergency, I will be transported by local paramedics to [facility] or other hospital emergency department. 6.  I certify that I have read and fully understand the above consent to operation and/or other procedure. 7.  I acknowledge that my physician has explained sedation/analgesia administration to me including the risks and benefits. I consent to the administration of sedation/analgesia as may be necessary or desirable in the judgement of my physician.     Witness signature: ___________________________________________________ Date: ______/______/_____                    Time:  ________ A. M.  P.M. Patient Name:  ______________________________________________________  (please print)      Patient signature:  ___________________________________________________             Relationship to Patient:           []  Parent    Responsible person                          []  Spouse  In case of minor or                    [] Other  _____________   Incompetent name:  __________________________________________________                               (please print)      _____________      Responsible person  In case of minor or  Incompetent signature:  _______________________________________________    Statement of Physician  My signature below affirms that prior to the time of the procedure, I have explained to the patient and/or his/her guardian, the risks and benefits involved in the proposed treatment and any reasonable alternative to the proposed treatment. I have also explained the risks and benefits involved in the refusal of the proposed treatment and have answered the patient's questions.                         Date:  ______/______/_______  Provider                      Signature:  __________________________________________________________       Time:  ___________ A.M    P.M.

## (undated) NOTE — LETTER
AUTHORIZATION FOR SURGICAL OPERATION OR OTHER PROCEDURE    1. I hereby authorize Checo Lubin, and [facility] staff assigned to my case to perform the following operation and/or procedure at the [facility] site:    _______________________________________________________________________________________________    Right knee aspiration and injection  _______________________________________________________________________________________________    2.  My physician has explained the nature and purpose of the operation or other procedure, possible alternative methods of treatment, the risks involved, and the possibility of complication to me.  I acknowledge that no guarantee has been made as to the result that may be obtained.  3.  I recognize that, during the course of this operation, or other procedure, unforseen conditions may necessitate additional or different procedure than those listed above.  I, therefore, further authorize and request that the above named physician, his/her physician assistants or designees perform such procedures as are, in his/her professional opinion, necessary and desirable.  4.  Any tissue or organs removed in the operation or other procedure may be disposed of by and at the discretion of the [facility] and [facility].  5.  I understand that in the event of a medical emergency, I will be transported by local paramedics to [facility] or other hospital emergency department.  6.  I certify that I have read and fully understand the above consent to operation and/or other procedure.    7.  I acknowledge that my physician has explained sedation/analgesia administration to me including the risks and benefits.  I consent to the administration of sedation/analgesia as may be necessary or desirable in the judgement of my physician.    Witness signature: ___________________________________________________ Date:   ______/______/_____                    Time:  ________ A.M.  P.M.       Patient Name:  ______________________________________________________  (please print)      Patient signature:  ___________________________________________________             Relationship to Patient:           []  Parent    Responsible person                          []  Spouse  In case of minor or                    [] Other  _____________   Incompetent name:  __________________________________________________                               (please print)      _____________      Responsible person  In case of minor or  Incompetent signature:  _______________________________________________    Statement of Physician  My signature below affirms that prior to the time of the procedure, I have explained to the patient and/or his/her guardian, the risks and benefits involved in the proposed treatment and any reasonable alternative to the proposed treatment.  I have also explained the risks and benefits involved in the refusal of the proposed treatment and have answered the patient's questions.                        Date:  ______/______/_______  Provider                      Signature:  __________________________________________________________       Time:  ___________ A.M    P.M.

## (undated) NOTE — ED AVS SNAPSHOT
Jorge Horton   MRN: [id number]    Department:  [facility] Emergency Department   Date of Visit:  10/16/2017           Disclosure     Insurance plans vary and the physician(s) referred by the ER may not be covered by your plan.  Please contact your CARE PHYSICIAN AT ONCE OR RETURN IMMEDIATELY TO THE EMERGENCY DEPARTMENT. If you have been prescribed any medication(s), please fill your prescription right away and begin taking the medication(s) as directed.   If you believe that any of the medications

## (undated) NOTE — LETTER
AUTHORIZATION FOR SURGICAL OPERATION OR OTHER PROCEDURE    1. I hereby authorize Dr. Britt , and [facility] staff assigned to my case to perform the following operation and/or procedure at the [facility] site:    Right knee cortisone injection   _______________________________________________________________________________________________      _______________________________________________________________________________________________    2.  My physician has explained the nature and purpose of the operation or other procedure, possible alternative methods of treatment, the risks involved, and the possibility of complication to me.  I acknowledge that no guarantee has been made as to the result that may be obtained.  3.  I recognize that, during the course of this operation, or other procedure, unforseen conditions may necessitate additional or different procedure than those listed above.  I, therefore, further authorize and request that the above named physician, his/her physician assistants or designees perform such procedures as are, in his/her professional opinion, necessary and desirable.  4.  Any tissue or organs removed in the operation or other procedure may be disposed of by and at the discretion of the [facility] and [facility].  5.  I understand that in the event of a medical emergency, I will be transported by local paramedics to [facility] or other hospital emergency department.  6.  I certify that I have read and fully understand the above consent to operation and/or other procedure.    7.  I acknowledge that my physician has explained sedation/analgesia administration to me including the risks and benefits.  I consent to the administration of sedation/analgesia as may be necessary or desirable in the judgement of my physician.    Witness signature: ___________________________________________________ Date:   ______/______/_____                    Time:  ________ A.M.  P.M.       Patient Name:  ______________________________________________________  (please print)      Patient signature:  ___________________________________________________             Relationship to Patient:           []  Parent    Responsible person                          []  Spouse  In case of minor or                    [] Other  _____________   Incompetent name:  __________________________________________________                               (please print)      _____________      Responsible person  In case of minor or  Incompetent signature:  _______________________________________________    Statement of Physician  My signature below affirms that prior to the time of the procedure, I have explained to the patient and/or his/her guardian, the risks and benefits involved in the proposed treatment and any reasonable alternative to the proposed treatment.  I have also explained the risks and benefits involved in the refusal of the proposed treatment and have answered the patient's questions.                        Date:  ______/______/_______  Provider                      Signature:  __________________________________________________________       Time:  ___________ A.M    P.M.

## (undated) NOTE — LETTER
1/28/2020          To Whom It May Concern:    Dyan Mak is currently under my medical care and may not return to work at this time. Please excuse Francois Mendoza from work. She may return to work on Monday, 2/3/2020. Activity is restricted as follows: none.     I

## (undated) NOTE — LETTER
2/5/2021          To Whom It May Concern:    Austin Sharma is currently under my medical care and may not return to work at this time. Please excuse Eileen for 9 days. She may return to work on 2/12/2021. Activity is restricted as follows: none.     If you r

## (undated) NOTE — LETTER
AUTHORIZATION FOR SURGICAL OPERATION OR OTHER PROCEDURE    1.  I hereby authorize                             , [facility] assigned to my case to perform the following operation and/or procedure at the [facility]:    ____________ Patient Name:  ______________________________________________________  (please print)      Patient signature:  ___________________________________________________             Relationship to Patient:           []  Parent    Responsible person

## (undated) NOTE — LETTER
AUTHORIZATION FOR SURGICAL OPERATION OR OTHER PROCEDURE    1.  I hereby authorize Dr. Daisy Griffith / ALFRED Carrillo and [address] [place] [address] staff assigned to my case to perform the following operation and/or procedure at the [address] [place] [address]: ______/______/_____                    Time:  ________ A. M.  P.M.        Patient Name:  ______________________________________________________  (please print)      Patient signature:  ___________________________________________________             Relations

## (undated) NOTE — LETTER
AUTHORIZATION FOR SURGICAL OPERATION OR OTHER PROCEDURE    1. I hereby authorize Dr. Britt/Ramana German PA-C , and [facility] staff assigned to my case to perform the following operation and/or procedure at the [facility] site:    Right knee cortisone injection   _______________________________________________________________________________________________      _______________________________________________________________________________________________    2.  My physician has explained the nature and purpose of the operation or other procedure, possible alternative methods of treatment, the risks involved, and the possibility of complication to me.  I acknowledge that no guarantee has been made as to the result that may be obtained.  3.  I recognize that, during the course of this operation, or other procedure, unforseen conditions may necessitate additional or different procedure than those listed above.  I, therefore, further authorize and request that the above named physician, his/her physician assistants or designees perform such procedures as are, in his/her professional opinion, necessary and desirable.  4.  Any tissue or organs removed in the operation or other procedure may be disposed of by and at the discretion of the [facility] and [facility].  5.  I understand that in the event of a medical emergency, I will be transported by local paramedics to [facility] or other hospital emergency department.  6.  I certify that I have read and fully understand the above consent to operation and/or other procedure.    7.  I acknowledge that my physician has explained sedation/analgesia administration to me including the risks and benefits.  I consent to the administration of sedation/analgesia as may be necessary or desirable in the judgement of my physician.    Witness signature: ___________________________________________________ Date:   ______/______/_____                    Time:  ________ A.M.  P.M.       Patient Name:  ______________________________________________________  (please print)      Patient signature:  ___________________________________________________             Relationship to Patient:           []  Parent    Responsible person                          []  Spouse  In case of minor or                    [] Other  _____________   Incompetent name:  __________________________________________________                               (please print)      _____________      Responsible person  In case of minor or  Incompetent signature:  _______________________________________________    Statement of Physician  My signature below affirms that prior to the time of the procedure, I have explained to the patient and/or his/her guardian, the risks and benefits involved in the proposed treatment and any reasonable alternative to the proposed treatment.  I have also explained the risks and benefits involved in the refusal of the proposed treatment and have answered the patient's questions.                        Date:  ______/______/_______  Provider                      Signature:  __________________________________________________________       Time:  ___________ A.M    P.M.

## (undated) NOTE — LETTER
04/23/19        Eileen Brown      Dear Shaina Warner,    Our records indicate that you have outstanding lab work and or testing that was ordered for you and has not yet been completed:  Orders Placed This Encounter      PILAR

## (undated) NOTE — LETTER
AUTHORIZATION FOR SURGICAL OPERATION OR OTHER PROCEDURE    1. I hereby authorize Dr. Juju Loaiza PA-C, and [address] [place] [address] staff assigned to my case to perform the following operation and/or procedure at the [address] [place] [address]:    _______________________________________________________________________________________________    Cortisone injection, right knee  _______________________________________________________________________________________________    2. My physician has explained the nature and purpose of the operation or other procedure, possible alternative methods of treatment, the risks involved, and the possibility of complication to me. I acknowledge that no guarantee has been made as to the result that may be obtained. 3.  I recognize that, during the course of this operation, or other procedure, unforseen conditions may necessitate additional or different procedure than those listed above. I, therefore, further authorize and request that the above named physician, his/her physician assistants or designees perform such procedures as are, in his/her professional opinion, necessary and desirable. 4.  Any tissue or organs removed in the operation or other procedure may be disposed of by and at the discretion of the [address] [place] [address] and [facility]. 5.  I understand that in the event of a medical emergency, I will be transported by local paramedics to [facility] or other hospital emergency department. 6.  I certify that I have read and fully understand the above consent to operation and/or other procedure. 7.  I acknowledge that my physician has explained sedation/analgesia administration to me including the risks and benefits. I consent to the administration of sedation/analgesia as may be necessary or desirable in the judgement of my physician.     Witness signature: ___________________________________________________ Date: ______/______/_____                    Time:  ________ A. M.  P.M. Patient Name:  ______________________________________________________  (please print)      Patient signature:  ___________________________________________________             Relationship to Patient:           []  Parent    Responsible person                          []  Spouse  In case of minor or                    [] Other  _____________   Incompetent name:  __________________________________________________                               (please print)      _____________      Responsible person  In case of minor or  Incompetent signature:  _______________________________________________    Statement of Physician  My signature below affirms that prior to the time of the procedure, I have explained to the patient and/or his/her guardian, the risks and benefits involved in the proposed treatment and any reasonable alternative to the proposed treatment. I have also explained the risks and benefits involved in the refusal of the proposed treatment and have answered the patient's questions.                         Date:  ______/______/_______  Provider                      Signature:  __________________________________________________________       Time:  ___________ A.M    P.M.

## (undated) NOTE — LETTER
AUTHORIZATION FOR SURGICAL OPERATION OR OTHER PROCEDURE    1.  I hereby authorize Dr. Gricelda Vang, and [address] [place] [address] staff assigned to my case to perform the following operation and/or procedure at the [address] [place] [address]:    ____________________________ Patient Name:  ______________________________________________________  (please print)      Patient signature:  ___________________________________________________             Relationship to Patient:           []  Parent    Responsible person

## (undated) NOTE — LETTER
AUTHORIZATION FOR SURGICAL OPERATION OR OTHER PROCEDURE    1.  I hereby authorize Dr. Gretchen Kessler [facility] and [facility] assigned to my case to perform the following operation and/or procedure at the [facility]:    ________________ ______/______/_____                    Time:  ________ A. M.  P.M.        Patient Name:  ______________________________________________________  (please print)      Patient signature:  ___________________________________________________             Relations

## (undated) NOTE — LETTER
11/19/2018              Eileen Christianson        [address] [place] [address], [facility] [address]        [place] [address] [address]         Dear Tila Montero,    This letter is to inform you that our office has made several attempts to reach you by phone without success.   We were attempting to contact yo

## (undated) NOTE — LETTER
AUTHORIZATION FOR SURGICAL OPERATION OR OTHER PROCEDURE    1. I hereby authorize Dr. Britt, and [facility] staff assigned to my case to perform the following operation and/or procedure at the [facility] site:    Right knee cortisone injection  _______________________________________________________________________________________________      _______________________________________________________________________________________________    2.  My physician has explained the nature and purpose of the operation or other procedure, possible alternative methods of treatment, the risks involved, and the possibility of complication to me.  I acknowledge that no guarantee has been made as to the result that may be obtained.  3.  I recognize that, during the course of this operation, or other procedure, unforseen conditions may necessitate additional or different procedure than those listed above.  I, therefore, further authorize and request that the above named physician, his/her physician assistants or designees perform such procedures as are, in his/her professional opinion, necessary and desirable.  4.  Any tissue or organs removed in the operation or other procedure may be disposed of by and at the discretion of the [facility] and [facility].  5.  I understand that in the event of a medical emergency, I will be transported by local paramedics to [facility] or other hospital emergency department.  6.  I certify that I have read and fully understand the above consent to operation and/or other procedure.    7.  I acknowledge that my physician has explained sedation/analgesia administration to me including the risks and benefits.  I consent to the administration of sedation/analgesia as may be necessary or desirable in the judgement of my physician.    Witness signature: ___________________________________________________ Date:   ______/______/_____                    Time:  ________ A.M.  P.M.       Patient Name:  ______________________________________________________  (please print)      Patient signature:  ___________________________________________________             Relationship to Patient:           []  Parent    Responsible person                          []  Spouse  In case of minor or                    [] Other  _____________   Incompetent name:  __________________________________________________                               (please print)      _____________      Responsible person  In case of minor or  Incompetent signature:  _______________________________________________    Statement of Physician  My signature below affirms that prior to the time of the procedure, I have explained to the patient and/or his/her guardian, the risks and benefits involved in the proposed treatment and any reasonable alternative to the proposed treatment.  I have also explained the risks and benefits involved in the refusal of the proposed treatment and have answered the patient's questions.                        Date:  ______/______/_______  Provider                      Signature:  __________________________________________________________       Time:  ___________ A.M    P.M.

## (undated) NOTE — LETTER
AUTHORIZATION FOR SURGICAL OPERATION OR OTHER PROCEDURE    1.  I [facility] Jayme Cooper, and [facility] assigned [facility] my case to perform the following operation and/or procedure at the [facility]:    ____________________________ Patient Name:  ______________________________________________________  (please print)      Patient signature:  ___________________________________________________             Relationship to Patient:           []  Parent    Responsible person

## (undated) NOTE — LETTER
AUTHORIZATION FOR SURGICAL OPERATION OR OTHER PROCEDURE    1. I hereby authorize Dr. Mallory Zhu, and [place] [facility] staff assigned to my case to perform the following operation and/or procedure at the [facility], [facility]:    _______________________________________________________________________________________________    ENDOMETRIAL BIOPSY   _______________________________________________________________________________________________    2. My physician has explained the nature and purpose of the operation or other procedure, possible alternative methods of treatment, the risks involved, and the possibility of complication to me. I acknowledge that no guarantee has been made as to the result that may be obtained. 3.  I recognize that, during the course of this operation, or other procedure, unforseen conditions may necessitate additional or different procedure than those listed above. I, therefore, further authorize and request that the above named physician, his/her physician assistants or designees perform such procedures as are, in his/her professional opinion, necessary and desirable. 4.  Any tissue or organs removed in the operation or other procedure may be disposed of by and at the discretion of the [facility], [facility] and [facility]. 5.  I understand that in the event of a medical emergency, I will be transported by local paramedics to [facility] or other [facility] emergency department. 6.  I certify that I have read and fully understand the above consent to operation and/or other procedure. 7.  I acknowledge that my physician has explained sedation/analgesia administration to me including the risks and benefits. I consent to the administration of sedation/analgesia as may be necessary or desirable in the judgement of my physician.     Witness signature: ___________________________________________________ Date:  ______/______/_____                    Time:  ________ A.M.  P.M. Patient Name:  ______________________________________________________  (please print)      Patient signature:  ___________________________________________________             Relationship to Patient:           []  Parent    Responsible person                          []  Spouse  In case of minor or                    [] Other  _____________   Incompetent name:  __________________________________________________                               (please print)      _____________      Responsible person  In case of minor or  Incompetent signature:  _______________________________________________    Statement of Physician  My signature below affirms that prior to the time of the procedure, I have explained to the patient and/or his/her guardian, the risks and benefits involved in the proposed treatment and any reasonable alternative to the proposed treatment. I have also explained the risks and benefits involved in the refusal of the proposed treatment and have answered the patient's questions.                         Date:  ______/______/_______  Provider                      Signature:  __________________________________________________________       Time:  ___________ A.M    P.M.